# Patient Record
Sex: MALE | Race: WHITE | Employment: FULL TIME | URBAN - METROPOLITAN AREA
[De-identification: names, ages, dates, MRNs, and addresses within clinical notes are randomized per-mention and may not be internally consistent; named-entity substitution may affect disease eponyms.]

---

## 2022-01-11 ENCOUNTER — ANESTHESIA (OUTPATIENT)
Dept: OPERATING ROOM | Age: 47
DRG: 853 | End: 2022-01-11

## 2022-01-11 ENCOUNTER — ANESTHESIA EVENT (OUTPATIENT)
Dept: OPERATING ROOM | Age: 47
DRG: 853 | End: 2022-01-11

## 2022-01-11 ENCOUNTER — HOSPITAL ENCOUNTER (INPATIENT)
Age: 47
LOS: 6 days | Discharge: HOME | DRG: 853 | End: 2022-01-17
Attending: COLON & RECTAL SURGERY | Admitting: COLON & RECTAL SURGERY

## 2022-01-11 ENCOUNTER — APPOINTMENT (OUTPATIENT)
Dept: CT IMAGING | Age: 47
End: 2022-01-11

## 2022-01-11 ENCOUNTER — HOSPITAL ENCOUNTER (EMERGENCY)
Age: 47
Discharge: ANOTHER HOSPITAL | End: 2022-01-11
Attending: EMERGENCY MEDICINE

## 2022-01-11 VITALS
SYSTOLIC BLOOD PRESSURE: 102 MMHG | RESPIRATION RATE: 9 BRPM | DIASTOLIC BLOOD PRESSURE: 59 MMHG | OXYGEN SATURATION: 97 % | TEMPERATURE: 101.1 F

## 2022-01-11 VITALS
HEART RATE: 138 BPM | OXYGEN SATURATION: 91 % | TEMPERATURE: 100.9 F | SYSTOLIC BLOOD PRESSURE: 111 MMHG | RESPIRATION RATE: 20 BRPM | DIASTOLIC BLOOD PRESSURE: 75 MMHG

## 2022-01-11 DIAGNOSIS — K35.32 APPENDICITIS WITH PERFORATION: Primary | ICD-10-CM

## 2022-01-11 DIAGNOSIS — K35.32 ACUTE PERFORATED APPENDICITIS: Primary | ICD-10-CM

## 2022-01-11 LAB
ALBUMIN SERPL-MCNC: 4.7 G/DL (ref 3.5–4.6)
ALP BLD-CCNC: 92 U/L (ref 35–104)
ALT SERPL-CCNC: 46 U/L (ref 0–41)
ANION GAP SERPL CALCULATED.3IONS-SCNC: 14 MEQ/L (ref 9–15)
AST SERPL-CCNC: 16 U/L (ref 0–40)
BACTERIA: NEGATIVE /HPF
BASOPHILS ABSOLUTE: 0 K/UL (ref 0–0.1)
BASOPHILS RELATIVE PERCENT: 0.2 % (ref 0.2–1.2)
BILIRUB SERPL-MCNC: 1 MG/DL (ref 0.2–0.7)
BILIRUBIN URINE: ABNORMAL
BLOOD, URINE: ABNORMAL
BUN BLDV-MCNC: 9 MG/DL (ref 6–20)
CALCIUM SERPL-MCNC: 9.7 MG/DL (ref 8.5–9.9)
CHLORIDE BLD-SCNC: 95 MEQ/L (ref 95–107)
CLARITY: CLEAR
CO2: 25 MEQ/L (ref 20–31)
COLOR: ABNORMAL
CREAT SERPL-MCNC: 1.06 MG/DL (ref 0.7–1.2)
EOSINOPHILS ABSOLUTE: 0 K/UL (ref 0–0.5)
EOSINOPHILS RELATIVE PERCENT: 0.1 % (ref 0.8–7)
EPITHELIAL CELLS, UA: NORMAL /HPF
GFR AFRICAN AMERICAN: >60
GFR NON-AFRICAN AMERICAN: >60
GLOBULIN: 3.1 G/DL (ref 2.3–3.5)
GLUCOSE BLD-MCNC: 193 MG/DL (ref 70–99)
GLUCOSE URINE: NEGATIVE MG/DL
HCT VFR BLD CALC: 45.6 % (ref 42–52)
HEMOGLOBIN: 15.7 G/DL (ref 13.7–17.5)
IMMATURE GRANULOCYTES #: 0.1 K/UL
IMMATURE GRANULOCYTES %: 0.6 %
KETONES, URINE: ABNORMAL MG/DL
LEUKOCYTE ESTERASE, URINE: NEGATIVE
LIPASE: 19 U/L (ref 12–95)
LYMPHOCYTES ABSOLUTE: 1.3 K/UL (ref 1.3–3.6)
LYMPHOCYTES RELATIVE PERCENT: 7.4 %
MCH RBC QN AUTO: 29 PG (ref 25.7–32.2)
MCHC RBC AUTO-ENTMCNC: 34.4 % (ref 32.3–36.5)
MCV RBC AUTO: 84.1 FL (ref 79–92.2)
MONOCYTES ABSOLUTE: 1.6 K/UL (ref 0.3–0.8)
MONOCYTES RELATIVE PERCENT: 8.8 % (ref 5.3–12.2)
NEUTROPHILS ABSOLUTE: 14.8 K/UL (ref 1.8–5.4)
NEUTROPHILS RELATIVE PERCENT: 82.9 % (ref 34–67.9)
NITRITE, URINE: NEGATIVE
PDW BLD-RTO: 12.5 % (ref 11.6–14.4)
PH UA: 5.5 (ref 5–9)
PLATELET # BLD: 240 K/UL (ref 163–337)
POTASSIUM SERPL-SCNC: 4 MEQ/L (ref 3.4–4.9)
PROTEIN UA: 30 MG/DL
RBC # BLD: 5.42 M/UL (ref 4.63–6.08)
RBC UA: NORMAL /HPF (ref 0–2)
SARS-COV-2, NAAT: NOT DETECTED
SODIUM BLD-SCNC: 134 MEQ/L (ref 135–144)
SPECIFIC GRAVITY UA: >=1.03 (ref 1–1.03)
TOTAL PROTEIN: 7.8 G/DL (ref 6.3–8)
URINE REFLEX TO CULTURE: ABNORMAL
UROBILINOGEN, URINE: 1 E.U./DL
WBC # BLD: 17.9 K/UL (ref 4.2–9)
WBC UA: NORMAL /HPF (ref 0–5)

## 2022-01-11 PROCEDURE — 88304 TISSUE EXAM BY PATHOLOGIST: CPT

## 2022-01-11 PROCEDURE — 85025 COMPLETE CBC W/AUTO DIFF WBC: CPT

## 2022-01-11 PROCEDURE — 2580000003 HC RX 258: Performed by: COLON & RECTAL SURGERY

## 2022-01-11 PROCEDURE — 6360000002 HC RX W HCPCS: Performed by: EMERGENCY MEDICINE

## 2022-01-11 PROCEDURE — 3600000004 HC SURGERY LEVEL 4 BASE: Performed by: COLON & RECTAL SURGERY

## 2022-01-11 PROCEDURE — 6360000002 HC RX W HCPCS: Performed by: COLON & RECTAL SURGERY

## 2022-01-11 PROCEDURE — 7100000001 HC PACU RECOVERY - ADDTL 15 MIN: Performed by: COLON & RECTAL SURGERY

## 2022-01-11 PROCEDURE — 87077 CULTURE AEROBIC IDENTIFY: CPT

## 2022-01-11 PROCEDURE — 6360000002 HC RX W HCPCS: Performed by: NURSE ANESTHETIST, CERTIFIED REGISTERED

## 2022-01-11 PROCEDURE — 6370000000 HC RX 637 (ALT 250 FOR IP): Performed by: EMERGENCY MEDICINE

## 2022-01-11 PROCEDURE — 6360000002 HC RX W HCPCS: Performed by: STUDENT IN AN ORGANIZED HEALTH CARE EDUCATION/TRAINING PROGRAM

## 2022-01-11 PROCEDURE — 99253 IP/OBS CNSLTJ NEW/EST LOW 45: CPT | Performed by: COLON & RECTAL SURGERY

## 2022-01-11 PROCEDURE — 87205 SMEAR GRAM STAIN: CPT

## 2022-01-11 PROCEDURE — 3600000014 HC SURGERY LEVEL 4 ADDTL 15MIN: Performed by: COLON & RECTAL SURGERY

## 2022-01-11 PROCEDURE — 7100000000 HC PACU RECOVERY - FIRST 15 MIN: Performed by: COLON & RECTAL SURGERY

## 2022-01-11 PROCEDURE — 87070 CULTURE OTHR SPECIMN AEROBIC: CPT

## 2022-01-11 PROCEDURE — 87075 CULTR BACTERIA EXCEPT BLOOD: CPT

## 2022-01-11 PROCEDURE — 44970 LAPAROSCOPY APPENDECTOMY: CPT | Performed by: COLON & RECTAL SURGERY

## 2022-01-11 PROCEDURE — 6370000000 HC RX 637 (ALT 250 FOR IP)

## 2022-01-11 PROCEDURE — 93005 ELECTROCARDIOGRAM TRACING: CPT | Performed by: STUDENT IN AN ORGANIZED HEALTH CARE EDUCATION/TRAINING PROGRAM

## 2022-01-11 PROCEDURE — 2500000003 HC RX 250 WO HCPCS: Performed by: NURSE ANESTHETIST, CERTIFIED REGISTERED

## 2022-01-11 PROCEDURE — 83690 ASSAY OF LIPASE: CPT

## 2022-01-11 PROCEDURE — 81001 URINALYSIS AUTO W/SCOPE: CPT

## 2022-01-11 PROCEDURE — 74176 CT ABD & PELVIS W/O CONTRAST: CPT

## 2022-01-11 PROCEDURE — 99284 EMERGENCY DEPT VISIT MOD MDM: CPT

## 2022-01-11 PROCEDURE — 2580000003 HC RX 258: Performed by: STUDENT IN AN ORGANIZED HEALTH CARE EDUCATION/TRAINING PROGRAM

## 2022-01-11 PROCEDURE — 96365 THER/PROPH/DIAG IV INF INIT: CPT

## 2022-01-11 PROCEDURE — 64488 TAP BLOCK BI INJECTION: CPT | Performed by: STUDENT IN AN ORGANIZED HEALTH CARE EDUCATION/TRAINING PROGRAM

## 2022-01-11 PROCEDURE — 2580000003 HC RX 258: Performed by: EMERGENCY MEDICINE

## 2022-01-11 PROCEDURE — 2709999900 HC NON-CHARGEABLE SUPPLY: Performed by: COLON & RECTAL SURGERY

## 2022-01-11 PROCEDURE — 6370000000 HC RX 637 (ALT 250 FOR IP): Performed by: COLON & RECTAL SURGERY

## 2022-01-11 PROCEDURE — 96375 TX/PRO/DX INJ NEW DRUG ADDON: CPT

## 2022-01-11 PROCEDURE — 2500000003 HC RX 250 WO HCPCS: Performed by: STUDENT IN AN ORGANIZED HEALTH CARE EDUCATION/TRAINING PROGRAM

## 2022-01-11 PROCEDURE — 3700000000 HC ANESTHESIA ATTENDED CARE: Performed by: COLON & RECTAL SURGERY

## 2022-01-11 PROCEDURE — 80053 COMPREHEN METABOLIC PANEL: CPT

## 2022-01-11 PROCEDURE — 96376 TX/PRO/DX INJ SAME DRUG ADON: CPT

## 2022-01-11 PROCEDURE — 2580000003 HC RX 258: Performed by: NURSE ANESTHETIST, CERTIFIED REGISTERED

## 2022-01-11 PROCEDURE — 2720000010 HC SURG SUPPLY STERILE: Performed by: COLON & RECTAL SURGERY

## 2022-01-11 PROCEDURE — 87635 SARS-COV-2 COVID-19 AMP PRB: CPT

## 2022-01-11 PROCEDURE — 3700000001 HC ADD 15 MINUTES (ANESTHESIA): Performed by: COLON & RECTAL SURGERY

## 2022-01-11 PROCEDURE — 1210000000 HC MED SURG R&B

## 2022-01-11 PROCEDURE — 0DTJ4ZZ RESECTION OF APPENDIX, PERCUTANEOUS ENDOSCOPIC APPROACH: ICD-10-PCS | Performed by: COLON & RECTAL SURGERY

## 2022-01-11 PROCEDURE — 87186 SC STD MICRODIL/AGAR DIL: CPT

## 2022-01-11 RX ORDER — ONDANSETRON 2 MG/ML
4 INJECTION INTRAMUSCULAR; INTRAVENOUS
Status: DISCONTINUED | OUTPATIENT
Start: 2022-01-11 | End: 2022-01-11 | Stop reason: HOSPADM

## 2022-01-11 RX ORDER — MAGNESIUM HYDROXIDE 1200 MG/15ML
LIQUID ORAL CONTINUOUS PRN
Status: DISCONTINUED | OUTPATIENT
Start: 2022-01-11 | End: 2022-01-11 | Stop reason: ALTCHOICE

## 2022-01-11 RX ORDER — OMEPRAZOLE 10 MG/1
10 CAPSULE, DELAYED RELEASE ORAL PRN
COMMUNITY

## 2022-01-11 RX ORDER — LIDOCAINE HYDROCHLORIDE AND EPINEPHRINE 10; 10 MG/ML; UG/ML
INJECTION, SOLUTION INFILTRATION; PERINEURAL PRN
Status: DISCONTINUED | OUTPATIENT
Start: 2022-01-11 | End: 2022-01-11 | Stop reason: SDUPTHER

## 2022-01-11 RX ORDER — MEPERIDINE HYDROCHLORIDE 25 MG/ML
12.5 INJECTION INTRAMUSCULAR; INTRAVENOUS; SUBCUTANEOUS EVERY 5 MIN PRN
Status: DISCONTINUED | OUTPATIENT
Start: 2022-01-11 | End: 2022-01-11 | Stop reason: HOSPADM

## 2022-01-11 RX ORDER — HYDROCODONE BITARTRATE AND ACETAMINOPHEN 5; 325 MG/1; MG/1
1 TABLET ORAL PRN
Status: DISCONTINUED | OUTPATIENT
Start: 2022-01-11 | End: 2022-01-11 | Stop reason: HOSPADM

## 2022-01-11 RX ORDER — OXYCODONE HYDROCHLORIDE AND ACETAMINOPHEN 5; 325 MG/1; MG/1
1 TABLET ORAL EVERY 4 HOURS PRN
Status: DISCONTINUED | OUTPATIENT
Start: 2022-01-11 | End: 2022-01-17 | Stop reason: HOSPADM

## 2022-01-11 RX ORDER — SODIUM CHLORIDE 9 MG/ML
25 INJECTION, SOLUTION INTRAVENOUS PRN
Status: DISCONTINUED | OUTPATIENT
Start: 2022-01-11 | End: 2022-01-17 | Stop reason: HOSPADM

## 2022-01-11 RX ORDER — ONDANSETRON 2 MG/ML
4 INJECTION INTRAMUSCULAR; INTRAVENOUS ONCE
Status: COMPLETED | OUTPATIENT
Start: 2022-01-11 | End: 2022-01-11

## 2022-01-11 RX ORDER — ROPIVACAINE HYDROCHLORIDE 5 MG/ML
INJECTION, SOLUTION EPIDURAL; INFILTRATION; PERINEURAL
Status: COMPLETED | OUTPATIENT
Start: 2022-01-11 | End: 2022-01-11

## 2022-01-11 RX ORDER — ACETAMINOPHEN 325 MG/1
650 TABLET ORAL ONCE
Status: COMPLETED | OUTPATIENT
Start: 2022-01-11 | End: 2022-01-11

## 2022-01-11 RX ORDER — FENTANYL CITRATE 50 UG/ML
INJECTION, SOLUTION INTRAMUSCULAR; INTRAVENOUS PRN
Status: DISCONTINUED | OUTPATIENT
Start: 2022-01-11 | End: 2022-01-11 | Stop reason: SDUPTHER

## 2022-01-11 RX ORDER — ROCURONIUM BROMIDE 10 MG/ML
INJECTION, SOLUTION INTRAVENOUS PRN
Status: DISCONTINUED | OUTPATIENT
Start: 2022-01-11 | End: 2022-01-11 | Stop reason: SDUPTHER

## 2022-01-11 RX ORDER — MORPHINE SULFATE 4 MG/ML
4 INJECTION, SOLUTION INTRAMUSCULAR; INTRAVENOUS ONCE
Status: COMPLETED | OUTPATIENT
Start: 2022-01-11 | End: 2022-01-11

## 2022-01-11 RX ORDER — MIDAZOLAM HYDROCHLORIDE 1 MG/ML
INJECTION INTRAMUSCULAR; INTRAVENOUS PRN
Status: DISCONTINUED | OUTPATIENT
Start: 2022-01-11 | End: 2022-01-11 | Stop reason: SDUPTHER

## 2022-01-11 RX ORDER — SODIUM CHLORIDE 0.9 % (FLUSH) 0.9 %
3 SYRINGE (ML) INJECTION EVERY 8 HOURS
Status: DISCONTINUED | OUTPATIENT
Start: 2022-01-11 | End: 2022-01-11 | Stop reason: HOSPADM

## 2022-01-11 RX ORDER — PROPOFOL 10 MG/ML
INJECTION, EMULSION INTRAVENOUS PRN
Status: DISCONTINUED | OUTPATIENT
Start: 2022-01-11 | End: 2022-01-11 | Stop reason: SDUPTHER

## 2022-01-11 RX ORDER — 0.9 % SODIUM CHLORIDE 0.9 %
500 INTRAVENOUS SOLUTION INTRAVENOUS ONCE
Status: COMPLETED | OUTPATIENT
Start: 2022-01-11 | End: 2022-01-11

## 2022-01-11 RX ORDER — SODIUM CHLORIDE 0.9 % (FLUSH) 0.9 %
5-40 SYRINGE (ML) INJECTION EVERY 12 HOURS SCHEDULED
Status: DISCONTINUED | OUTPATIENT
Start: 2022-01-11 | End: 2022-01-17 | Stop reason: HOSPADM

## 2022-01-11 RX ORDER — ONDANSETRON 4 MG/1
4 TABLET, ORALLY DISINTEGRATING ORAL EVERY 8 HOURS PRN
Status: DISCONTINUED | OUTPATIENT
Start: 2022-01-11 | End: 2022-01-17 | Stop reason: HOSPADM

## 2022-01-11 RX ORDER — SODIUM CHLORIDE 9 MG/ML
INJECTION, SOLUTION INTRAVENOUS CONTINUOUS
Status: DISCONTINUED | OUTPATIENT
Start: 2022-01-11 | End: 2022-01-16

## 2022-01-11 RX ORDER — METOCLOPRAMIDE HYDROCHLORIDE 5 MG/ML
10 INJECTION INTRAMUSCULAR; INTRAVENOUS
Status: DISCONTINUED | OUTPATIENT
Start: 2022-01-11 | End: 2022-01-11 | Stop reason: HOSPADM

## 2022-01-11 RX ORDER — KETOROLAC TROMETHAMINE 30 MG/ML
INJECTION, SOLUTION INTRAMUSCULAR; INTRAVENOUS PRN
Status: DISCONTINUED | OUTPATIENT
Start: 2022-01-11 | End: 2022-01-11 | Stop reason: SDUPTHER

## 2022-01-11 RX ORDER — SODIUM CHLORIDE 9 MG/ML
INJECTION, SOLUTION INTRAVENOUS ONCE
Status: COMPLETED | OUTPATIENT
Start: 2022-01-11 | End: 2022-01-11

## 2022-01-11 RX ORDER — SODIUM CHLORIDE 0.9 % (FLUSH) 0.9 %
5-40 SYRINGE (ML) INJECTION PRN
Status: DISCONTINUED | OUTPATIENT
Start: 2022-01-11 | End: 2022-01-17 | Stop reason: HOSPADM

## 2022-01-11 RX ORDER — SODIUM CHLORIDE 9 MG/ML
25 INJECTION, SOLUTION INTRAVENOUS PRN
Status: DISCONTINUED | OUTPATIENT
Start: 2022-01-11 | End: 2022-01-11 | Stop reason: HOSPADM

## 2022-01-11 RX ORDER — MORPHINE SULFATE 2 MG/ML
2 INJECTION, SOLUTION INTRAMUSCULAR; INTRAVENOUS ONCE
Status: COMPLETED | OUTPATIENT
Start: 2022-01-11 | End: 2022-01-11

## 2022-01-11 RX ORDER — DIPHENHYDRAMINE HYDROCHLORIDE 50 MG/ML
12.5 INJECTION INTRAMUSCULAR; INTRAVENOUS
Status: ACTIVE | OUTPATIENT
Start: 2022-01-11 | End: 2022-01-11

## 2022-01-11 RX ORDER — ONDANSETRON 2 MG/ML
4 INJECTION INTRAMUSCULAR; INTRAVENOUS EVERY 6 HOURS PRN
Status: DISCONTINUED | OUTPATIENT
Start: 2022-01-11 | End: 2022-01-17 | Stop reason: HOSPADM

## 2022-01-11 RX ORDER — LIDOCAINE HYDROCHLORIDE 20 MG/ML
INJECTION, SOLUTION INFILTRATION; PERINEURAL PRN
Status: DISCONTINUED | OUTPATIENT
Start: 2022-01-11 | End: 2022-01-11 | Stop reason: SDUPTHER

## 2022-01-11 RX ORDER — ACETAMINOPHEN 500 MG
500 TABLET ORAL EVERY 4 HOURS PRN
Status: DISCONTINUED | OUTPATIENT
Start: 2022-01-11 | End: 2022-01-17 | Stop reason: HOSPADM

## 2022-01-11 RX ORDER — ONDANSETRON 2 MG/ML
INJECTION INTRAMUSCULAR; INTRAVENOUS PRN
Status: DISCONTINUED | OUTPATIENT
Start: 2022-01-11 | End: 2022-01-11 | Stop reason: SDUPTHER

## 2022-01-11 RX ORDER — SODIUM CHLORIDE, SODIUM LACTATE, POTASSIUM CHLORIDE, CALCIUM CHLORIDE 600; 310; 30; 20 MG/100ML; MG/100ML; MG/100ML; MG/100ML
INJECTION, SOLUTION INTRAVENOUS CONTINUOUS
Status: DISCONTINUED | OUTPATIENT
Start: 2022-01-11 | End: 2022-01-11

## 2022-01-11 RX ORDER — HYDROCODONE BITARTRATE AND ACETAMINOPHEN 5; 325 MG/1; MG/1
2 TABLET ORAL PRN
Status: DISCONTINUED | OUTPATIENT
Start: 2022-01-11 | End: 2022-01-11 | Stop reason: HOSPADM

## 2022-01-11 RX ORDER — SODIUM CHLORIDE, SODIUM LACTATE, POTASSIUM CHLORIDE, CALCIUM CHLORIDE 600; 310; 30; 20 MG/100ML; MG/100ML; MG/100ML; MG/100ML
INJECTION, SOLUTION INTRAVENOUS CONTINUOUS PRN
Status: DISCONTINUED | OUTPATIENT
Start: 2022-01-11 | End: 2022-01-11 | Stop reason: SDUPTHER

## 2022-01-11 RX ORDER — SODIUM CHLORIDE 0.9 % (FLUSH) 0.9 %
10 SYRINGE (ML) INJECTION EVERY 12 HOURS SCHEDULED
Status: DISCONTINUED | OUTPATIENT
Start: 2022-01-11 | End: 2022-01-11 | Stop reason: HOSPADM

## 2022-01-11 RX ORDER — OXYCODONE HYDROCHLORIDE AND ACETAMINOPHEN 5; 325 MG/1; MG/1
2 TABLET ORAL EVERY 4 HOURS PRN
Status: DISCONTINUED | OUTPATIENT
Start: 2022-01-11 | End: 2022-01-17 | Stop reason: HOSPADM

## 2022-01-11 RX ORDER — FENTANYL CITRATE 50 UG/ML
50 INJECTION, SOLUTION INTRAMUSCULAR; INTRAVENOUS EVERY 10 MIN PRN
Status: DISCONTINUED | OUTPATIENT
Start: 2022-01-11 | End: 2022-01-12

## 2022-01-11 RX ORDER — LIDOCAINE HYDROCHLORIDE 10 MG/ML
1 INJECTION, SOLUTION EPIDURAL; INFILTRATION; INTRACAUDAL; PERINEURAL
Status: DISCONTINUED | OUTPATIENT
Start: 2022-01-11 | End: 2022-01-11 | Stop reason: HOSPADM

## 2022-01-11 RX ORDER — DEXAMETHASONE SODIUM PHOSPHATE 4 MG/ML
INJECTION, SOLUTION INTRA-ARTICULAR; INTRALESIONAL; INTRAMUSCULAR; INTRAVENOUS; SOFT TISSUE PRN
Status: DISCONTINUED | OUTPATIENT
Start: 2022-01-11 | End: 2022-01-11 | Stop reason: SDUPTHER

## 2022-01-11 RX ORDER — SODIUM CHLORIDE 0.9 % (FLUSH) 0.9 %
10 SYRINGE (ML) INJECTION PRN
Status: DISCONTINUED | OUTPATIENT
Start: 2022-01-11 | End: 2022-01-11 | Stop reason: HOSPADM

## 2022-01-11 RX ADMIN — PIPERACILLIN SODIUM AND TAZOBACTAM SODIUM 3375 MG: 3; .375 INJECTION, POWDER, LYOPHILIZED, FOR SOLUTION INTRAVENOUS at 20:07

## 2022-01-11 RX ADMIN — ROPIVACAINE HYDROCHLORIDE 30 ML: 5 INJECTION, SOLUTION EPIDURAL; INFILTRATION; PERINEURAL at 12:06

## 2022-01-11 RX ADMIN — OXYCODONE AND ACETAMINOPHEN 1 TABLET: 5; 325 TABLET ORAL at 23:27

## 2022-01-11 RX ADMIN — MORPHINE SULFATE 4 MG: 4 INJECTION, SOLUTION INTRAMUSCULAR; INTRAVENOUS at 04:48

## 2022-01-11 RX ADMIN — MORPHINE SULFATE 2 MG: 2 INJECTION, SOLUTION INTRAMUSCULAR; INTRAVENOUS at 05:51

## 2022-01-11 RX ADMIN — ONDANSETRON 4 MG: 2 INJECTION INTRAMUSCULAR; INTRAVENOUS at 04:48

## 2022-01-11 RX ADMIN — FENTANYL CITRATE 50 MCG: 50 INJECTION, SOLUTION INTRAMUSCULAR; INTRAVENOUS at 14:15

## 2022-01-11 RX ADMIN — PROPOFOL 200 MG: 10 INJECTION, EMULSION INTRAVENOUS at 11:59

## 2022-01-11 RX ADMIN — LIDOCAINE HYDROCHLORIDE 60 MG: 20 INJECTION, SOLUTION INFILTRATION; PERINEURAL at 11:59

## 2022-01-11 RX ADMIN — FENTANYL CITRATE 50 MCG: 50 INJECTION, SOLUTION INTRAMUSCULAR; INTRAVENOUS at 14:24

## 2022-01-11 RX ADMIN — PIPERACILLIN AND TAZOBACTAM 3375 MG: 3; .375 INJECTION, POWDER, LYOPHILIZED, FOR SOLUTION INTRAVENOUS at 11:55

## 2022-01-11 RX ADMIN — SODIUM CHLORIDE: 9 INJECTION, SOLUTION INTRAVENOUS at 20:42

## 2022-01-11 RX ADMIN — SUGAMMADEX 200 MG: 100 INJECTION, SOLUTION INTRAVENOUS at 13:07

## 2022-01-11 RX ADMIN — SODIUM CHLORIDE 500 ML: 9 INJECTION, SOLUTION INTRAVENOUS at 04:47

## 2022-01-11 RX ADMIN — SODIUM CHLORIDE, POTASSIUM CHLORIDE, SODIUM LACTATE AND CALCIUM CHLORIDE: 600; 310; 30; 20 INJECTION, SOLUTION INTRAVENOUS at 11:55

## 2022-01-11 RX ADMIN — DEXAMETHASONE SODIUM PHOSPHATE 4 MG: 4 INJECTION, SOLUTION INTRAMUSCULAR; INTRAVENOUS at 12:07

## 2022-01-11 RX ADMIN — SODIUM CHLORIDE: 9 INJECTION, SOLUTION INTRAVENOUS at 06:50

## 2022-01-11 RX ADMIN — ONDANSETRON 4 MG: 2 INJECTION INTRAMUSCULAR; INTRAVENOUS at 12:07

## 2022-01-11 RX ADMIN — FENTANYL CITRATE 50 MCG: 50 INJECTION, SOLUTION INTRAMUSCULAR; INTRAVENOUS at 12:44

## 2022-01-11 RX ADMIN — ROCURONIUM BROMIDE 50 MG: 10 INJECTION INTRAVENOUS at 11:59

## 2022-01-11 RX ADMIN — PIPERACILLIN AND TAZOBACTAM 3375 MG: 3; .375 INJECTION, POWDER, LYOPHILIZED, FOR SOLUTION INTRAVENOUS at 05:51

## 2022-01-11 RX ADMIN — ACETAMINOPHEN 650 MG: 325 TABLET ORAL at 07:22

## 2022-01-11 RX ADMIN — SODIUM CHLORIDE, POTASSIUM CHLORIDE, SODIUM LACTATE AND CALCIUM CHLORIDE: 600; 310; 30; 20 INJECTION, SOLUTION INTRAVENOUS at 09:52

## 2022-01-11 RX ADMIN — MIDAZOLAM HYDROCHLORIDE 2 MG: 1 INJECTION, SOLUTION INTRAMUSCULAR; INTRAVENOUS at 11:55

## 2022-01-11 RX ADMIN — LIDOCAINE HYDROCHLORIDE AND EPINEPHRINE 10 ML: 10; 10 INJECTION, SOLUTION INFILTRATION; PERINEURAL at 12:05

## 2022-01-11 RX ADMIN — FENTANYL CITRATE 50 MCG: 50 INJECTION, SOLUTION INTRAMUSCULAR; INTRAVENOUS at 11:59

## 2022-01-11 RX ADMIN — KETOROLAC TROMETHAMINE 30 MG: 30 INJECTION, SOLUTION INTRAMUSCULAR; INTRAVENOUS at 12:45

## 2022-01-11 ASSESSMENT — PULMONARY FUNCTION TESTS
PIF_VALUE: 25
PIF_VALUE: 21
PIF_VALUE: 36
PIF_VALUE: 35
PIF_VALUE: 35
PIF_VALUE: 33
PIF_VALUE: 22
PIF_VALUE: 26
PIF_VALUE: 21
PIF_VALUE: 35
PIF_VALUE: 2
PIF_VALUE: 0
PIF_VALUE: 22
PIF_VALUE: 21
PIF_VALUE: 35
PIF_VALUE: 24
PIF_VALUE: 35
PIF_VALUE: 30
PIF_VALUE: 22
PIF_VALUE: 35
PIF_VALUE: 0
PIF_VALUE: 35
PIF_VALUE: 31
PIF_VALUE: 35
PIF_VALUE: 33
PIF_VALUE: 2
PIF_VALUE: 42
PIF_VALUE: 35
PIF_VALUE: 36
PIF_VALUE: 2
PIF_VALUE: 34
PIF_VALUE: 35
PIF_VALUE: 35
PIF_VALUE: 14
PIF_VALUE: 35
PIF_VALUE: 35
PIF_VALUE: 28
PIF_VALUE: 35
PIF_VALUE: 21
PIF_VALUE: 33
PIF_VALUE: 35
PIF_VALUE: 22
PIF_VALUE: 21
PIF_VALUE: 36
PIF_VALUE: 35
PIF_VALUE: 23
PIF_VALUE: 35
PIF_VALUE: 28
PIF_VALUE: 35
PIF_VALUE: 36
PIF_VALUE: 35
PIF_VALUE: 22
PIF_VALUE: 33
PIF_VALUE: 33
PIF_VALUE: 1
PIF_VALUE: 31
PIF_VALUE: 27
PIF_VALUE: 27
PIF_VALUE: 0
PIF_VALUE: 24
PIF_VALUE: 33
PIF_VALUE: 29
PIF_VALUE: 27
PIF_VALUE: 35
PIF_VALUE: 35
PIF_VALUE: 36
PIF_VALUE: 20
PIF_VALUE: 0
PIF_VALUE: 35
PIF_VALUE: 33
PIF_VALUE: 35
PIF_VALUE: 21
PIF_VALUE: 35
PIF_VALUE: 35

## 2022-01-11 ASSESSMENT — PAIN DESCRIPTION - ORIENTATION: ORIENTATION: MID;LOWER

## 2022-01-11 ASSESSMENT — ENCOUNTER SYMPTOMS
COUGH: 0
COLOR CHANGE: 0
SORE THROAT: 0
SHORTNESS OF BREATH: 0
DIARRHEA: 0
NAUSEA: 0
EYE REDNESS: 0
BACK PAIN: 0
VOMITING: 0
ABDOMINAL PAIN: 1
SINUS PAIN: 0
EYE DISCHARGE: 0

## 2022-01-11 ASSESSMENT — PAIN SCALES - GENERAL
PAINLEVEL_OUTOF10: 10
PAINLEVEL_OUTOF10: 8
PAINLEVEL_OUTOF10: 9
PAINLEVEL_OUTOF10: 10
PAINLEVEL_OUTOF10: 10
PAINLEVEL_OUTOF10: 6

## 2022-01-11 ASSESSMENT — PAIN DESCRIPTION - LOCATION: LOCATION: ABDOMEN

## 2022-01-11 ASSESSMENT — PAIN DESCRIPTION - PAIN TYPE: TYPE: ACUTE PAIN

## 2022-01-11 ASSESSMENT — LIFESTYLE VARIABLES: SMOKING_STATUS: 1

## 2022-01-11 ASSESSMENT — PAIN DESCRIPTION - DESCRIPTORS: DESCRIPTORS: CRAMPING;PRESSURE

## 2022-01-11 NOTE — BRIEF OP NOTE
Brief Postoperative Note      Patient: George Hair  YOB: 1975  MRN: [id number]    Date of Procedure: 1/11/2022    Pre-Op Diagnosis: APPENDICITIS    Post-Op Diagnosis: Perforated necrotic appendicitis       Procedure(s):  LAPAROSCOPIC APPENDECTOMY, PATIENT COMING FROM [place]    Surgeon(s):  Judith Johnson MD    Assistant:  First Assistant: Sangeeta Briggs    Anesthesia: General    Estimated Blood Loss (mL): 25    Complications: None    Specimens:   ID Type Source Tests Collected by Time Destination   1 : pelvic abscess Body Fluid Abdomen CULTURE, SURGICAL Judith Johnson MD 1/11/2022 1258    A : Appendix Tissue Appendix SURGICAL PATHOLOGY Judith Johnson MD 1/11/2022 1245        Implants:  * No implants in log *      Drains:   Urethral Catheter Straight-tip 16 fr (Active)   Urine Color Zaida 01/11/22 1010       Findings: Perforated necrotic appendicitis with pelvic abscess    Electronically signed by Scarlett Leiva MD on 1/11/2022 at 1:16 PM

## 2022-01-11 NOTE — ED NOTES
Dr Aleks yu served inquiring if he had a time frame on when he wanted patient to be in [facility], ready for surgery so  transport can be arranged appropriately.  Responded with \"No\"     [employer], RN  01/11/22 2726

## 2022-01-11 NOTE — PROGRESS NOTES
13:15 Arrived to PACU from OR monitor leads applied rec'd report and pt assessed. Remains sleepy from surgery sinus tach on tele B/P stable. 13:30 Dr. Selmer Kawasaki at bedside updated family and new orders rec'd. Will monitor. 13:45 More awake resting calmly remains tachy B/P stable.

## 2022-01-11 NOTE — ED TRIAGE NOTES
Patient arrives to ER with mid lower abdominal pain, states he has been having trouble urinating most of yesterday but has been taking in fluids ok. Has had normal BM's but has had lower abdomen cramping, wondered if he was having some constipation still so he drank some metamucil. Patient also states prior to coming into the ER he had some nausea and broke out in a sweat but denies vomiting or diarrhea.

## 2022-01-11 NOTE — PROGRESS NOTES
Pharmacy Dose Adjustment Per Protocol    Micheline Cogan is a 40 y.o. male. Recent Labs     01/11/22  0447   BUN 9   CREATININE 1.06   Estimated Creatinine Clearance: 105 mL/min (based on SCr of 1.06 mg/dL). Maycol Ramires Height: 6' (182.9 cm), Weight: 210 lb (95.3 kg), Body mass index is 28.48 kg/m².     Piperacillin/Tazobactam [Zosyn]      Medication Ordered:   Traditional Dosing Change to Extended Infusion:   CrCl ?20 ml/min [] Zosyn 2.25 gm q6-8 hr [x] Zosyn 3.375 gm IV q8h, infuse over 4 hr    [x] Zosyn 3.375 gm q6-8 hr     [] Zosyn 4.5 gm q6-8 hr    CrCl <20 ml/min or ESRD [] Zosyn 2.25 gm q6-8 hr [] Zosyn 3.375 gm IV q12h, infuse over 4 hr    [] Zosyn 3.375 gm q6-8 hr     [] Zosyn 4.5 gm q6-8 hr      Thank you,

## 2022-01-11 NOTE — ANESTHESIA POSTPROCEDURE EVALUATION
Department of Anesthesiology  Postprocedure Note    Patient: Jim Mendoza  MRN: [id number]  YOB: 1975  Date of evaluation: 1/11/2022  Time:  1:17 PM     Procedure Summary     Date: 01/11/22 Room / Location: [facility] OR 01 / [facility]    Anesthesia Start: 3562 Anesthesia Stop: 9253    Procedure: LAPAROSCOPIC APPENDECTOMY, PATIENT COMING FROM [place] (N/A ) Diagnosis: (APPENDICITIS)    Surgeons: Ruth Ivey MD Responsible Provider: Gregory Garcia DO    Anesthesia Type: general, regional ASA Status: 1 - Emergent          Anesthesia Type: general, regional    Sandra Phase I:      Sandra Phase II:      Last vitals: Reviewed and per EMR flowsheets.        Anesthesia Post Evaluation    Patient location during evaluation: PACU  Patient participation: complete - patient participated  Level of consciousness: awake and awake and alert  Airway patency: patent  Nausea & Vomiting: no nausea and no vomiting  Complications: no  Cardiovascular status: blood pressure returned to baseline and hemodynamically stable  Respiratory status: acceptable  Hydration status: euvolemic

## 2022-01-11 NOTE — ANESTHESIA PROCEDURE NOTES
Peripheral Block    Patient location during procedure: OR  Start time: 1/11/2022 11:58 AM  End time: 1/11/2022 12:06 PM  Staffing  Performed: anesthesiologist   Anesthesiologist: Angely Silva DO  Preanesthetic Checklist  Completed: patient identified, IV checked, site marked, risks and benefits discussed, surgical consent, monitors and equipment checked, pre-op evaluation, timeout performed, anesthesia consent given, oxygen available and patient being monitored  Peripheral Block  Patient position: supine  Prep: ChloraPrep  Patient monitoring: cardiac monitor, continuous pulse ox, frequent blood pressure checks and IV access  Block type: TAP  Laterality: bilateral  Injection technique: single-shot  Guidance: ultrasound guided  Local infiltration: ropivacaine  Infiltration strength: 0.5 %  Dose: 30 mL  Provider prep: mask and sterile gloves (Sterile probe cover)  Local infiltration: ropivacaine  Needle  Needle type: combined needle/nerve stimulator   Needle gauge: 22 G  Needle length: 10 cm  Needle localization: anatomical landmarks and ultrasound guidance  Assessment  Injection assessment: negative aspiration for heme, no paresthesia on injection and local visualized surrounding nerve on ultrasound  Paresthesia pain: immediately resolved  Slow fractionated injection: yes  Hemodynamics: stable  Additional Notes  Ultrasound image printed and saved in patient chart.     Sterile probe cover used    Ropivacaine 0.5 % 30 ml + lidocaine 1% with epi 1:100 k 10 ml    20 ml bilateral  Medications Administered  Ropivacaine (NAROPIN) injection 0.5%, 30 mL  Reason for block: post-op pain management and at surgeon's request

## 2022-01-11 NOTE — ED NOTES
[facility] center called for general surgery to initiate transfer to [address] [place] [address]  01/11/22 2021

## 2022-01-11 NOTE — ED NOTES
Dr Moon Marin returned call to Dr John Foreman, accepted patient for admission     [facility], RN  01/11/22 [address], RN  01/11/22 1217

## 2022-01-11 NOTE — ED PROVIDER NOTES
[facility] ED  EMERGENCY DEPARTMENT ENCOUNTER      Pt Name: Roshan Braden  MRN: [id number]  [place] 1975  Date of evaluation: 1/11/2022  Provider: Debora Sanon DO    CHIEF COMPLAINT       Chief Complaint   Patient presents with    Abdominal Pain     Chief complaint: Abdominal pain  History of chief complaint: This 43-year-old gentleman presents the emergency department complaining of abdominal pain. Patient points over the suprapubic area states it feels like he has to pee but cannot go. Patient states he had a little bit of upset stomach throughout the day yesterday, started in the morning thought maybe he was constipated but had a good bowel movement patient states he felt okay throughout the day, denies any difficulty with urinating throughout the day, no dysuria hematuria frequency or urgency states he was trying to go to sleep about 4 hours prior to arrival, states he did urinate before he went to bed around midnight but was having increasing gradual pain in the suprapubic area like he had to pee but was unable to go anymore, patient denies any pain into the back no fevers chills nausea vomiting weak or dizzy feeling. Patient denies any penile discharge or concern for STD no testicular pain. Patient denies any history of urinary retention. No previous abdominal surgeries    Nursing Notes were reviewed. REVIEW OF SYSTEMS    (2-9 systems for level 4, 10 or more for level 5)     Review of Systems   Constitutional: Negative for chills and fever. HENT: Negative for congestion, sinus pain and sore throat. Eyes: Negative for discharge and redness. Respiratory: Negative for cough and shortness of breath. Cardiovascular: Negative for chest pain, palpitations and leg swelling. Gastrointestinal: Positive for abdominal pain. Negative for diarrhea, nausea and vomiting. Genitourinary: Positive for difficulty urinating.  Negative for dysuria, flank pain, penile discharge, penile pain and Services: Not on file   [facility] or Organizations: Not on file    Attends Club or Organization Meetings: Not on file    Marital Status: Not on file   Intimate Partner Violence:     Fear of Current or Ex-Partner: Not on file    Emotionally Abused: Not on file    Physically Abused: Not on file    Sexually Abused: Not on file   Housing Stability:     Unable to Pay for Housing in the Last Year: Not on file    Number of [place] in the Last Year: Not on file    Unstable Housing in the Last Year: Not on file           PHYSICAL EXAM    (up to 7 for level 4, 8 or more for level 5)     ED Triage Vitals [01/11/22 0356]   BP Temp Temp src Pulse Resp SpO2 Height Weight   (!) 166/95 96.8 °F (36 °C) -- 115 21 98 % -- --       Physical Exam   General appearance: Patient is awake alert interactive appropriate nontoxic in no acute distress but obvious discomfort moaning intermittently  Head is atraumatic normocephalic  Eyes pupils are equal and reactive sclera white conjunctive are pink  Oral pharyngeal cavity is pink with good moisture, no exudates or ulcerations no asymmetry, the airway is widely patent  Neck: Supple no meningeal signs no adenopathy no JVD  Heart: Regular rate and rhythm no gross murmurs rubs or clicks  Lungs: Breath sounds are clear with good air movement throughout no active wheezes rales or rhonchi no respiratory distress  Abdomen: There is tenderness and fullness to palpation the suprapubic region with mild guarding there is no focal McBurney's point tenderness no rebound rigidity or guarding. There is mild general distention. No overlying rash ecchymosis or abrasion bowel sounds are present,no firm or pulsatile masses,  femoral pulses full and symmetric  Genital examination: Normal male genitalia no penile discharge no testicular pain or swelling.   Back: Nontender to palpation no costovertebral angle tenderness  Skin: Warm and dry without rashes  Lower extremities: No edema or repeat assessment patient states feeling significantly improved although still discomfort. Zosyn 3.375 g IV was initiated and repeat morphine 2 mg IV was given    Coordination of CARE: A call was placed out to surgical service at [facility] [place] awaiting a call back to arrange transfer      FINAL IMPRESSION      1. Acute perforated appendicitis          DISPOSITION/PLAN   DISPOSITION    Patient awaiting official acceptance for transfer in stable condition    PATIENT REFERRED TO:  No follow-up provider specified. DISCHARGE MEDICATIONS:  New Prescriptions    No medications on file     Controlled Substances Monitoring:     No flowsheet data found.     (Please note that portions of this note were completed with a voice recognition program.  Efforts were made to edit the dictations but occasionally words are mis-transcribed.)    Tonie Yang DO (electronically signed)  Attending Emergency Physician            Tonie Yang DO  01/16/22 [address]

## 2022-01-11 NOTE — ANESTHESIA PRE PROCEDURE
Department of Anesthesiology  Preprocedure Note       Name:  Mary Johnston   Age:  40 y.o.  :  1975                                          MRN:  [id number]         Date:  2022      Surgeon: Tez Bolton):  Mendy Mijares MD    Procedure: Procedure(s):  LAPAROSCOPIC APPENDECTOMY, PATIENT COMING FROM [place]    Medications prior to admission:   Prior to Admission medications    Not on File       Current medications:    Current Facility-Administered Medications   Medication Dose Route Frequency Provider Last Rate Last Admin    lactated ringers infusion   IntraVENous Continuous Hershall Mahogany Ortiz,  mL/hr at 22 0952 New Bag at 22 0952    piperacillin-tazobactam (ZOSYN) 3,375 mg in dextrose 5 % 50 mL IVPB (mini-bag)  3,375 mg IntraVENous On Call to Natasha Obrien MD           Allergies:  No Known Allergies    Problem List:  There is no problem list on file for this patient. Past Medical History:  History reviewed. No pertinent past medical history. Past Surgical History:  No past surgical history on file. Social History:    Social History     Tobacco Use    Smoking status: Current Every Day Smoker     Packs/day: 0.50     Types: Cigarettes    Smokeless tobacco: Never Used   Substance Use Topics    Alcohol use:  Yes                                Ready to quit: Not Answered  Counseling given: Not Answered      Vital Signs (Current):   Vitals:    22 0942   BP: 126/73   Pulse: 139   Resp: 22   Temp: 101.4 °F (38.6 °C)   TempSrc: Temporal   SpO2: 94%   Weight: 210 lb (95.3 kg)   Height: 6' (1.829 m)                                              BP Readings from Last 3 Encounters:   22 126/73   22 111/75       NPO Status: Time of last liquid consumption: 0100                        Time of last solid consumption: 0000                        Date of last liquid consumption: 22                        Date of last solid food consumption: 01/10/22    BMI: Wt Readings from Last 3 Encounters:   01/11/22 210 lb (95.3 kg)     Body mass index is 28.48 kg/m². CBC:   Lab Results   Component Value Date    WBC 17.9 01/11/2022    RBC 5.42 01/11/2022    HGB 15.7 01/11/2022    HCT 45.6 01/11/2022    MCV 84.1 01/11/2022    RDW 12.5 01/11/2022     01/11/2022       CMP:   Lab Results   Component Value Date     01/11/2022    K 4.0 01/11/2022    CL 95 01/11/2022    CO2 25 01/11/2022    BUN 9 01/11/2022    CREATININE 1.06 01/11/2022    GFRAA >60.0 01/11/2022    LABGLOM >60.0 01/11/2022    GLUCOSE 193 01/11/2022    PROT 7.8 01/11/2022    CALCIUM 9.7 01/11/2022    BILITOT 1.0 01/11/2022    ALKPHOS 92 01/11/2022    AST 16 01/11/2022    ALT 46 01/11/2022       POC Tests: No results for input(s): POCGLU, POCNA, POCK, POCCL, POCBUN, POCHEMO, POCHCT in the last 72 hours. Coags: No results found for: PROTIME, INR, APTT    HCG (If Applicable): No results found for: PREGTESTUR, PREGSERUM, HCG, HCGQUANT     ABGs: No results found for: PHART, PO2ART, GLQ0LPH, ACJ0SQQ, BEART, T5SIWFFD     Type & Screen (If Applicable):  No results found for: LABABO, LABRH    Drug/Infectious Status (If Applicable):  No results found for: HIV, HEPCAB    COVID-19 Screening (If Applicable):   Lab Results   Component Value Date    COVID19 Not Detected 01/11/2022           Anesthesia Evaluation  Patient summary reviewed and Nursing notes reviewed no history of anesthetic complications:   Airway: Mallampati: II  TM distance: >3 FB   Neck ROM: full  Mouth opening: > = 3 FB Dental: normal exam         Pulmonary:normal exam    (+) current smoker                           Cardiovascular:Negative CV ROS  Exercise tolerance: good (>4 METS),         ECG reviewed               Beta Blocker:  Not on Beta Blocker         Neuro/Psych:   Negative Neuro/Psych ROS              GI/Hepatic/Renal: Neg GI/Hepatic/Renal ROS            Endo/Other: Negative Endo/Other ROS             Pt had PAT visit.        Abdominal: Vascular: negative vascular ROS. Other Findings:             Anesthesia Plan      general and regional     ASA 1 - emergent     (ETT  TAP)  Induction: intravenous. MIPS: Postoperative opioids intended and Prophylactic antiemetics administered. Anesthetic plan and risks discussed with patient. Plan discussed with CRNA.     Attending anesthesiologist reviewed and agrees with Tera Milton DO   1/11/2022

## 2022-01-11 NOTE — ED NOTES
Supervisor called to get update with Uri Crowder supervisor regarding patient case and possible OR time.      [address]  01/11/22 1481

## 2022-01-12 LAB
ANION GAP SERPL CALCULATED.3IONS-SCNC: 9 MEQ/L (ref 9–15)
BUN BLDV-MCNC: 15 MG/DL (ref 6–20)
CALCIUM SERPL-MCNC: 9.4 MG/DL (ref 8.5–9.9)
CHLORIDE BLD-SCNC: 101 MEQ/L (ref 95–107)
CO2: 25 MEQ/L (ref 20–31)
CREAT SERPL-MCNC: 0.95 MG/DL (ref 0.7–1.2)
EKG ATRIAL RATE: 134 BPM
EKG P AXIS: 47 DEGREES
EKG P-R INTERVAL: 126 MS
EKG Q-T INTERVAL: 300 MS
EKG QRS DURATION: 88 MS
EKG QTC CALCULATION (BAZETT): 448 MS
EKG R AXIS: 42 DEGREES
EKG T AXIS: -11 DEGREES
EKG VENTRICULAR RATE: 134 BPM
GFR AFRICAN AMERICAN: >60
GFR NON-AFRICAN AMERICAN: >60
GLUCOSE BLD-MCNC: 111 MG/DL (ref 70–99)
HCT VFR BLD CALC: 37.8 % (ref 42–52)
HEMOGLOBIN: 12.4 G/DL (ref 14–18)
MCH RBC QN AUTO: 28 PG (ref 27–31.3)
MCHC RBC AUTO-ENTMCNC: 33 % (ref 33–37)
MCV RBC AUTO: 84.8 FL (ref 80–100)
PDW BLD-RTO: 13 % (ref 11.5–14.5)
PLATELET # BLD: 196 K/UL (ref 130–400)
POTASSIUM SERPL-SCNC: 4.3 MEQ/L (ref 3.4–4.9)
RBC # BLD: 4.45 M/UL (ref 4.7–6.1)
SODIUM BLD-SCNC: 135 MEQ/L (ref 135–144)
WBC # BLD: 21.9 K/UL (ref 4.8–10.8)

## 2022-01-12 PROCEDURE — 6360000002 HC RX W HCPCS: Performed by: COLON & RECTAL SURGERY

## 2022-01-12 PROCEDURE — 93308 TTE F-UP OR LMTD: CPT

## 2022-01-12 PROCEDURE — 85027 COMPLETE CBC AUTOMATED: CPT

## 2022-01-12 PROCEDURE — 2700000000 HC OXYGEN THERAPY PER DAY

## 2022-01-12 PROCEDURE — 36415 COLL VENOUS BLD VENIPUNCTURE: CPT

## 2022-01-12 PROCEDURE — 6370000000 HC RX 637 (ALT 250 FOR IP): Performed by: COLON & RECTAL SURGERY

## 2022-01-12 PROCEDURE — 2580000003 HC RX 258: Performed by: COLON & RECTAL SURGERY

## 2022-01-12 PROCEDURE — 93325 DOPPLER ECHO COLOR FLOW MAPG: CPT

## 2022-01-12 PROCEDURE — 80048 BASIC METABOLIC PNL TOTAL CA: CPT

## 2022-01-12 PROCEDURE — 93320 DOPPLER ECHO COMPLETE: CPT

## 2022-01-12 PROCEDURE — 99024 POSTOP FOLLOW-UP VISIT: CPT | Performed by: COLON & RECTAL SURGERY

## 2022-01-12 PROCEDURE — 1210000000 HC MED SURG R&B

## 2022-01-12 PROCEDURE — 99222 1ST HOSP IP/OBS MODERATE 55: CPT | Performed by: INTERNAL MEDICINE

## 2022-01-12 RX ORDER — PANTOPRAZOLE SODIUM 40 MG/1
40 TABLET, DELAYED RELEASE ORAL
Status: DISCONTINUED | OUTPATIENT
Start: 2022-01-12 | End: 2022-01-17 | Stop reason: HOSPADM

## 2022-01-12 RX ORDER — CALCIUM CARBONATE 200(500)MG
500 TABLET,CHEWABLE ORAL 3 TIMES DAILY PRN
Status: DISCONTINUED | OUTPATIENT
Start: 2022-01-12 | End: 2022-01-17 | Stop reason: HOSPADM

## 2022-01-12 RX ORDER — KETOROLAC TROMETHAMINE 30 MG/ML
30 INJECTION, SOLUTION INTRAMUSCULAR; INTRAVENOUS EVERY 6 HOURS PRN
Status: DISPENSED | OUTPATIENT
Start: 2022-01-12 | End: 2022-01-14

## 2022-01-12 RX ADMIN — PIPERACILLIN SODIUM AND TAZOBACTAM SODIUM 3375 MG: 3; .375 INJECTION, POWDER, LYOPHILIZED, FOR SOLUTION INTRAVENOUS at 13:11

## 2022-01-12 RX ADMIN — OXYCODONE AND ACETAMINOPHEN 2 TABLET: 5; 325 TABLET ORAL at 18:20

## 2022-01-12 RX ADMIN — Medication 10 ML: at 11:15

## 2022-01-12 RX ADMIN — ANTACID TABLETS 500 MG: 500 TABLET, CHEWABLE ORAL at 18:20

## 2022-01-12 RX ADMIN — NALOXEGOL OXALATE 25 MG: 12.5 TABLET, FILM COATED ORAL at 08:17

## 2022-01-12 RX ADMIN — ENOXAPARIN SODIUM 40 MG: 100 INJECTION SUBCUTANEOUS at 08:17

## 2022-01-12 RX ADMIN — PANTOPRAZOLE SODIUM 40 MG: 40 TABLET, DELAYED RELEASE ORAL at 16:25

## 2022-01-12 RX ADMIN — PIPERACILLIN SODIUM AND TAZOBACTAM SODIUM 3375 MG: 3; .375 INJECTION, POWDER, LYOPHILIZED, FOR SOLUTION INTRAVENOUS at 20:21

## 2022-01-12 RX ADMIN — SODIUM CHLORIDE: 9 INJECTION, SOLUTION INTRAVENOUS at 13:11

## 2022-01-12 RX ADMIN — PIPERACILLIN SODIUM AND TAZOBACTAM SODIUM 3375 MG: 3; .375 INJECTION, POWDER, LYOPHILIZED, FOR SOLUTION INTRAVENOUS at 04:30

## 2022-01-12 RX ADMIN — OXYCODONE AND ACETAMINOPHEN 2 TABLET: 5; 325 TABLET ORAL at 11:13

## 2022-01-12 ASSESSMENT — PAIN SCALES - GENERAL
PAINLEVEL_OUTOF10: 9
PAINLEVEL_OUTOF10: 9
PAINLEVEL_OUTOF10: 2
PAINLEVEL_OUTOF10: 7

## 2022-01-12 NOTE — PROGRESS NOTES
Perfect Serve sent to Dr. Selmer Kawasaki - Patient c/o pain in the abd 10/10, abdomen very distended and tender, patient sweating and warm to touch but no temp 98.8. Nauseous at times. unable to get relief from pain medication. Awaiting response. 1420 - Dr. Selmer Kawasaki states to utilize pain medications percocet and dilaudid and he will add Toradol as needed.

## 2022-01-12 NOTE — CONSULTS
Infectious Diseases Inpatient Consult Note      Reason for Consult:   Antibiotics management for appendiceal abscess  Primary Care Physician:  Rick Armenta MD  History Obtained From:   Pt, EPIC    Admit Date: 1/11/2022  Hospital Day: 2      HISTORY OF PRESENT ILLNESS:  This is a 40 y.o. male was admitted to [facility] ER  from home through ER with acute onset of severe right lower quadrant abdominal pain of 24 hours duration. Was found to have perforated appendicitis with by CT. Had laparoscopic appendectomy with 4 cm abscess reported. Patient has been febrile. Was started on IV Zosyn. Has persistent diffuse abdominal pain. No bowel movements or flatus. Had constipation for 2 days prior to admission. Patient was febrile and tachycardic on admission. Has severe leukocytosis. Intra-Op cultures positive for gram-negative rods with pending identification and susceptibility  Past medical surgical and social history were reviewed and are as detailed below  Patient denies any history of heart disease or heart murmur  History reviewed. No pertinent past medical history. Past Surgical History:   Procedure Laterality Date    LAPAROSCOPIC APPENDECTOMY N/A 1/11/2022    LAPAROSCOPIC APPENDECTOMY, PATIENT COMING FROM [place] performed by Mark Marley MD at [facility]       Current Medications:     sodium chloride flush  5-40 mL IntraVENous 2 times per day    enoxaparin  40 mg SubCUTAneous Daily    naloxegol  25 mg Oral Daily    piperacillin-tazobactam  3,375 mg IntraVENous Q8H       Allergies:  Patient has no known allergies.     Social History     Socioeconomic History    Marital status: Single     Spouse name: Not on file    Number of children: Not on file    Years of education: Not on file    Highest education level: Not on file   Occupational History    Not on file   Tobacco Use    Smoking status: Current Every Day Smoker     Packs/day: 0.50     Types: Cigarettes    Smokeless tobacco: Never Used Substance and Sexual Activity    Alcohol use: Yes    Drug use: Never    Sexual activity: Not on file   Other Topics Concern    Not on file   Social History Narrative    Not on file     Social Determinants of Health     Financial Resource Strain:     Difficulty of Paying Living Expenses: Not on file   Food Insecurity:     Worried About Running Out of Food in the Last Year: Not on file    Kay of Food in the Last Year: Not on file   Transportation Needs:     Lack of Transportation (Medical): Not on file    Lack of Transportation (Non-Medical): Not on file   Physical Activity:     Days of Exercise per Week: Not on file    Minutes of Exercise per Session: Not on file   Stress:     Feeling of Stress : Not on file   Social Connections:     Frequency of Communication with Friends and Family: Not on file    Frequency of Social Gatherings with Friends and Family: Not on file    Attends [employer] Services: Not on file    Active Member of [address] [place] [address] or Organizations: Not on file    Attends Club or Organization Meetings: Not on file    Marital Status: Not on file   Intimate Partner Violence:     Fear of Current or Ex-Partner: Not on file    Emotionally Abused: Not on file    Physically Abused: Not on file    Sexually Abused: Not on file   Housing Stability:     Unable to Pay for Housing in the Last Year: Not on file    Number of [place] in the Last Year: Not on file    Unstable Housing in the Last Year: Not on file         Family History:   No family history on file.     Review of Systems  14 system review is negative other than HPI    Physical Exam  Vitals:    01/11/22 2105 01/11/22 2213 01/12/22 0439 01/12/22 0734   BP: 108/66 114/62 126/75 120/72   Pulse: 114 116 100 109   Resp:    18   Temp:  99.9 °F (37.7 °C) 98.4 °F (36.9 °C) 98.1 °F (36.7 °C)   TempSrc:    Oral   SpO2:  95% 96% 97%   Weight:       Height:         General Appearance: alert and oriented to person, place and time, well-developed and well-nourished, in no acute distress, on room air  Skin: warm and dry, no rash. Head: normocephalic and atraumatic  Eyes: extraocular eye movements intact, conjunctivae normal, anicteric sclerae  ENT: Normal mucous membranes. No thrush  Lungs: normal respiratory effort, Clear Lungs, no rhonchi, no crackles, no wheezes  Heart:RRR, nl F9/W1, systolic ejection murmur grade 3/6 best heard at the apex and left sternal border  Abdomen: Distended with absent bowel sounds, intact dressing over the laparoscopy site  NEUROLOGICAL: alert and oriented x 3, no focal deficits  No leg edema  No erythema, no warmth, no tenderness        DATA:    Lab Results   Component Value Date    WBC 21.9 (H) 01/12/2022    HGB 12.4 (L) 01/12/2022    HCT 37.8 (L) 01/12/2022    MCV 84.8 01/12/2022     01/12/2022     Lab Results   Component Value Date    CREATININE 0.95 01/12/2022    BUN 15 01/12/2022     01/12/2022    K 4.3 01/12/2022     01/12/2022    CO2 25 01/12/2022       Hepatic Function Panel:   Lab Results   Component Value Date    ALKPHOS 92 01/11/2022    ALT 46 01/11/2022    AST 16 01/11/2022    PROT 7.8 01/11/2022    BILITOT 1.0 01/11/2022    LABALBU 4.7 01/11/2022   CT abdomen pelvis      Impression       1. Acute appendicitis with a perforated appendix is noted. There is also an appendicolith.       2. No evidence for bowel obstruction. No hydronephrosis is seen. Nonobstructing nephrolith is seen on the left.    3. Cholelithiasis       IMPRESSION:    · Acute perforated appendicitis with abscess  · Gram-negative veda infection  · New heart murmur    Patient Active Problem List   Diagnosis    Appendicitis with perforation       PLAN:  · IV Zosyn  · Check and follow-up cultures and accordingly adjust antibiotic therapy  · 2D echo for loud heart murmur, new finding  · Follow-up CBC BMP  · Patient will probably need IV antibiotics on discharge    Discussed with patient     Priscilla Ya MD

## 2022-01-12 NOTE — CARE COORDINATION
PER IV BENEFIT CHECK , PATIENT WITH NO INSURANCE, OVER INCOME FOR MEDICAID , IV ABX WILL BE 68$/ DAY

## 2022-01-12 NOTE — CARE COORDINATION
Dennie Hensen Case Management Initial Discharge Assessment    Met with Patient to discuss discharge plan. PCP: No primary care provider on file. Date of Last Visit:     If no PCP, list provided? Yes / appt. To be made with [address]    Discharge Planning    Living Arrangements: independently at home    Who do you live with? Alone     Who helps you with your care:  Friend, Hay Deluna will help with IV/ ATB    If lives at home:     Do you have any barriers navigating in your home? no    Patient can perform ADL? Yes    Current Services (outpatient and in home) :  None    Dialysis: No    Is transportation available to get to your appointments? Yes    DME Equipment:  no    Respiratory equipment: None    Respiratory provider:  no     Pharmacy:  yes - [facility] in [address] [facility] with Medication Assistance Program?  No      Patient agreeable to [facility]? Yes, [facility]    Patient agreeable to SNF/Rehab? No    Other discharge needs identified? N/A    Does Patient Have a High-Risk for Readmission Diagnosis (CHF, PN, MI, COPD)? No         Initial Discharge Plan? (Note: please see concurrent daily documentation for any updates after initial note). Patient is alert, oriented and pleasant in conversation. He lives alone in [facility]. He is supported well by his friend, Hay Kinneylashawn who will assist with helping patient with IV/ATB upon discharge. Patient plans to return home. Patient does not have PCP. PCP list provided to patient- appt. To be scheduled with [facility].      Readmission Risk              Risk of Unplanned Readmission:  [address] [place] [address]  Electronically signed by MC Baxter, GIA on 1/12/2022 at 11:49 AM

## 2022-01-12 NOTE — PROGRESS NOTES
Pt Name: Moira Cox Record Number: [id number]  Date of Birth 1975   Admit date 2022  9:35 AM  Today's Date: 2022     ASSESSMENT  1. Hospital day # 1  2 postop day #1 laparoscopic appendectomy and pelvic washout for perforated necrotic appendicitis    PLAN  1. Advance diet  2. Decrease IV fluids  3. ID consulted for assistance with evaluation for adjuvant antibiotics    SUBJECTIVE  Chief complaint: Follow-up appendectomy  Afebrile, vital signs are stable. He denies any nausea or vomiting, passing flatus. He is tolerating a ADULT DIET; Regular; Low Fiber. His pain is well controlled on current medications. He has been ambulating in the halls. has no past medical history on file. CURRENT MEDS  Scheduled Meds:   sodium chloride flush  5-40 mL IntraVENous 2 times per day    enoxaparin  40 mg SubCUTAneous Daily    naloxegol  25 mg Oral Daily    piperacillin-tazobactam  3,375 mg IntraVENous Q8H     Continuous Infusions:   sodium chloride      sodium chloride 125 mL/hr at 221     PRN Meds:.fentanNYL, HYDROmorphone, sodium chloride flush, sodium chloride, ondansetron **OR** ondansetron, oxyCODONE-acetaminophen **OR** oxyCODONE-acetaminophen, HYDROmorphone **OR** HYDROmorphone, acetaminophen    OBJECTIVE  CURRENT VITALS:  height is 6' (1.829 m) and weight is 210 lb (95.3 kg). His temperature is 98.4 °F (36.9 °C). His blood pressure is 126/75 and his pulse is 100. His respiration is 19 and oxygen saturation is 96%.    Temperature Range (24h):Temp: 98.4 °F (36.9 °C) Temp  Av.1 °F (38.4 °C)  Min: 97 °F (36.1 °C)  Max: 101.5 °F (38.6 °C)  BP Range (75S): Systolic (25AXI), VWF:204 , Min:95 , ZEE:580     Diastolic (72JGC), XZ, Min:49, Max:96    Pulse Range (24h): Pulse  Av.9  Min: 100  Max: 140  Respiration Range (24h): Resp  Av.9  Min: 0  Max: 28    GENERAL: alert, no distress  LUNGS: clear to ausculation, without wheezes, rales or rhonci  HEART: normal rate and regular rhythm  ABDOMEN: non-distended, dressings dry, bowel sounds present in all 4 quadrants and no guarding or peritoneal signs  EXTERMITY: no cyanosis, clubbing or edema  In: 1000 [I.V.:1000]  Out: 600 [Urine:600]      LABS  Recent Labs     01/11/22 0447 01/12/22  0624   WBC 17.9* 21.9*   HGB 15.7 12.4*   HCT 45.6 37.8*    196   * 135   K 4.0 4.3   CL 95 101   CO2 25 25   BUN 9 15   CREATININE 1.06 0.95   CALCIUM 9.7 9.4      No results for input(s): PTT, INR in the last 72 hours. Invalid input(s): PT  Recent Labs     01/11/22 0447   AST 16   ALT 46*   BILITOT 1.0*   LIPASE 19       RADIOLOGY  CT ABDOMEN PELVIS WO CONTRAST Additional Contrast? None    Result Date: 1/11/2022  COMPARISON: None available. HISTORY:  abdominal pain COMMENTS:  stomach pain TECHNIQUE: procedure Thin slice spiral CT was performed from the lung bases through the iliac crests without contrast administration. Contrast enhancement was not employed due to clinician's order. Sagittal and coronal reconstructions were also performed. FINDINGS: Images through the lung bases demonstrate no infiltrates or effusions. Pericecal stranding is seen. A 5 mm stone is seen in the appendix and the appendix is dilated up to approximately 16 mm. A tiny focus of air is seen adjacent to the appendix (series 2, image 76. Free fluid is seen posteriorly in the pelvis. A 2 cm stone is seen in the gallbladder. Non-contrast images of the liver, pancreas, spleen, adrenals are unremarkable. A 2 mm nonobstructing stone is seen in the left kidney. No hydro-nephrosis, renal calculi or marc-nephric fluid seen in the right kidney. .  No aneurysm  is visualized. Calcifications are seen in the prostate gland. Also a small right inguinal hernia is seen that contains fat. There is a Retana catheter seen in the bladder. There is also some air which is likely due to instrumentation. The lumbar spine is unremarkable.  In the mid thoracic spine degenerative changes are seen at multiple levels. No destructive bony lesions are evident. 1. Acute appendicitis with a perforated appendix is noted. There is also an appendicolith. 2. No evidence for bowel obstruction. No hydronephrosis is seen. Nonobstructing nephrolith is seen on the left. 3. Cholelithiasis  All CT scans at this facility use dose modulation, iterative reconstruction, and/or weight based dosing when appropriate to reduce radiation dose to as low as reasonably achievable.     Electronically signed by Beth Praker MD on 1/12/2022 at 11:01 AM

## 2022-01-12 NOTE — OP NOTE
[address]                      [address], [address] [facility]                                OPERATIVE REPORT    PATIENT NAME: Lizbet Whitt                         :        1975  MED REC NO:   [id number]                            ROOM:       Y030  ACCOUNT NO:   [de-identified]                           ADMIT DATE: 2022  PROVIDER:     Kerry Corrales MD    DATE OF PROCEDURE:  2022    PREOPERATIVE DIAGNOSIS:  Acute appendicitis. POSTOPERATIVE DIAGNOSIS:  Perforated necrotic appendicitis. PROCEDURE PERFORMED:  Laparoscopic appendectomy with abdominal washout. SURGEON:  Kerry Corrales MD    ASSISTANT:  Ms. Preeti Gill. ANESTHESIA:  1. General endotracheal anesthesia. 2.  Bilateral TAP block. ESTIMATED BLOOD LOSS:  25 mL. SPECIMEN:  1. Appendix. 2.  Pelvic abscess cultures. COMPLICATIONS:  None. INDICATIONS:  A 16-year-old male with appendicitis, possible perforation  based on CAT scan. He was transferred to our hospital for surgical  treatment. Risks and benefits of laparoscopic possible open  appendectomy outlined. Risks of the procedure including infection,  bleeding, damage to surrounding intestine, abscess formation and  persistent pain was discussed. Despite these risks, he wished to  proceed. Consent obtained. OPERATIVE PROCEDURE:  He was taken to the operating room, placed in the  supine position. General endotracheal anesthesia was administered. He  had a bilateral TAP block placed preoperatively. He received Zosyn  preoperatively. His abdomen was prepped and draped with a  ChloraPrep-containing solution. A time-out was taken for appropriate  verification. A 5-mm optical trocar was placed in the infraumbilical position and  pneumoperitoneum was established. The right and left lower quadrant ports were placed under direct  visualization. There was a large pelvic abscess present.   I obtain cultures and then  irrigated and aspirated all the pus from his pelvis. The appendix was visualized and was necrotic. It was held with one of  the graspers and a plane was created between the appendix and the  mesoappendix. A laparoscopic stapling device with the bowel load was  used to transect the appendix at the cecal base. The appendiceal  mesentery was clamped and ligated with the same stapler using a vascular  load. The appendix was placed in an EndoCatch bag and brought out the left  lower quadrant port site and sent to Pathology in formalin for permanent  section. Irrisept was added to the irrigation and was used to irrigate the pelvis  thoroughly as well as the right lower quadrant. I was able to aspirate  all the fluid and all the contamination. The staple lines were intact. The remainder of the laparoscopic evaluation of the abdomen was  unremarkable. After thoroughly evaluating for complete irrigation, the bowel and  omentum was placed back in normal anatomic position. There was no  bleeding seen and all the fluid in the pelvis and right lower quadrant  was irrigated and aspirated thoroughly. Pneumoperitoneum was released at this time and the trocars were all  removed. The fascia of the left lower quadrant was closed with interrupted 0  Vicryl suture. The skin incisions were closed with staples and  Band-Aids were applied as dressing. Prior to and after closure, the lap, needle, instrument counts were all  correct. Retana catheter was removed. The patient was extubated and taken to  recovery for postop monitoring.         Brittanie Rojo MD    D: 01/11/2022 13:57:57       T: 01/11/2022 14:01:57     [id number]  Job#: [id number]     Doc#: [id number]    CC:

## 2022-01-12 NOTE — PROGRESS NOTES
admission and assessment completed. Pt A&ox4 morgan to make needs known. Pt had c/o post-op abdominal pain, 5/10, medicated, see MAR. Steady gait noted. Pt educated to call for assistance. Will continue to monitor.

## 2022-01-12 NOTE — PROGRESS NOTES
Patient currently resting    Had echocardiogram will followed up on upon completion and reading. Dilaudid available for pain as well as Toradol. Patient experiencing symptoms of ileus secondary to peritonitis and pelvic abscess    Talked with girlfriend regarding adjuvant antibiotics as well as continued treatment. All questions were answered.

## 2022-01-13 ENCOUNTER — APPOINTMENT (OUTPATIENT)
Dept: INTERVENTIONAL RADIOLOGY/VASCULAR | Age: 47
DRG: 853 | End: 2022-01-13
Attending: COLON & RECTAL SURGERY

## 2022-01-13 LAB
ANION GAP SERPL CALCULATED.3IONS-SCNC: 12 MEQ/L (ref 9–15)
BASOPHILS ABSOLUTE: 0.1 K/UL (ref 0–0.2)
BASOPHILS RELATIVE PERCENT: 0.6 %
BUN BLDV-MCNC: 18 MG/DL (ref 6–20)
CALCIUM SERPL-MCNC: 9.2 MG/DL (ref 8.5–9.9)
CHLORIDE BLD-SCNC: 97 MEQ/L (ref 95–107)
CO2: 20 MEQ/L (ref 20–31)
CREAT SERPL-MCNC: 0.94 MG/DL (ref 0.7–1.2)
EOSINOPHILS ABSOLUTE: 0.3 K/UL (ref 0–0.7)
EOSINOPHILS RELATIVE PERCENT: 2.1 %
GFR AFRICAN AMERICAN: >60
GFR NON-AFRICAN AMERICAN: >60
GLUCOSE BLD-MCNC: 171 MG/DL (ref 70–99)
HCT VFR BLD CALC: 35.3 % (ref 42–52)
HEMOGLOBIN: 11.5 G/DL (ref 14–18)
LYMPHOCYTES ABSOLUTE: 1.5 K/UL (ref 1–4.8)
LYMPHOCYTES RELATIVE PERCENT: 10.5 %
MCH RBC QN AUTO: 27.6 PG (ref 27–31.3)
MCHC RBC AUTO-ENTMCNC: 32.7 % (ref 33–37)
MCV RBC AUTO: 84.6 FL (ref 80–100)
MONOCYTES ABSOLUTE: 0.9 K/UL (ref 0.2–0.8)
MONOCYTES RELATIVE PERCENT: 6 %
NEUTROPHILS ABSOLUTE: 12 K/UL (ref 1.4–6.5)
NEUTROPHILS RELATIVE PERCENT: 80.8 %
PDW BLD-RTO: 13 % (ref 11.5–14.5)
PLATELET # BLD: 222 K/UL (ref 130–400)
POTASSIUM SERPL-SCNC: 3.6 MEQ/L (ref 3.4–4.9)
RBC # BLD: 4.17 M/UL (ref 4.7–6.1)
SODIUM BLD-SCNC: 129 MEQ/L (ref 135–144)
WBC # BLD: 14.8 K/UL (ref 4.8–10.8)

## 2022-01-13 PROCEDURE — 99024 POSTOP FOLLOW-UP VISIT: CPT | Performed by: COLON & RECTAL SURGERY

## 2022-01-13 PROCEDURE — 36410 VNPNXR 3YR/> PHY/QHP DX/THER: CPT

## 2022-01-13 PROCEDURE — 2500000003 HC RX 250 WO HCPCS: Performed by: INTERNAL MEDICINE

## 2022-01-13 PROCEDURE — 80048 BASIC METABOLIC PNL TOTAL CA: CPT

## 2022-01-13 PROCEDURE — 2580000003 HC RX 258: Performed by: INTERNAL MEDICINE

## 2022-01-13 PROCEDURE — 85025 COMPLETE CBC W/AUTO DIFF WBC: CPT

## 2022-01-13 PROCEDURE — 2580000003 HC RX 258: Performed by: COLON & RECTAL SURGERY

## 2022-01-13 PROCEDURE — 2700000000 HC OXYGEN THERAPY PER DAY

## 2022-01-13 PROCEDURE — 05HB33Z INSERTION OF INFUSION DEVICE INTO RIGHT BASILIC VEIN, PERCUTANEOUS APPROACH: ICD-10-PCS | Performed by: COLON & RECTAL SURGERY

## 2022-01-13 PROCEDURE — 99232 SBSQ HOSP IP/OBS MODERATE 35: CPT | Performed by: INTERNAL MEDICINE

## 2022-01-13 PROCEDURE — 1210000000 HC MED SURG R&B

## 2022-01-13 PROCEDURE — 36415 COLL VENOUS BLD VENIPUNCTURE: CPT

## 2022-01-13 PROCEDURE — 6360000002 HC RX W HCPCS: Performed by: COLON & RECTAL SURGERY

## 2022-01-13 PROCEDURE — 2709999900 IR MIDLINE CATH

## 2022-01-13 PROCEDURE — 76937 US GUIDE VASCULAR ACCESS: CPT

## 2022-01-13 PROCEDURE — 6370000000 HC RX 637 (ALT 250 FOR IP): Performed by: COLON & RECTAL SURGERY

## 2022-01-13 PROCEDURE — 6370000000 HC RX 637 (ALT 250 FOR IP): Performed by: SURGERY

## 2022-01-13 RX ORDER — BISACODYL 10 MG
10 SUPPOSITORY, RECTAL RECTAL NIGHTLY
Status: DISPENSED | OUTPATIENT
Start: 2022-01-13 | End: 2022-01-16

## 2022-01-13 RX ORDER — SODIUM CHLORIDE 0.9 % (FLUSH) 0.9 %
5-40 SYRINGE (ML) INJECTION EVERY 12 HOURS SCHEDULED
Status: DISCONTINUED | OUTPATIENT
Start: 2022-01-13 | End: 2022-01-17 | Stop reason: HOSPADM

## 2022-01-13 RX ORDER — SODIUM CHLORIDE 9 MG/ML
250 INJECTION, SOLUTION INTRAVENOUS ONCE
Status: COMPLETED | OUTPATIENT
Start: 2022-01-13 | End: 2022-01-13

## 2022-01-13 RX ORDER — SODIUM CHLORIDE 0.9 % (FLUSH) 0.9 %
5-40 SYRINGE (ML) INJECTION PRN
Status: DISCONTINUED | OUTPATIENT
Start: 2022-01-13 | End: 2022-01-17 | Stop reason: HOSPADM

## 2022-01-13 RX ORDER — LIDOCAINE HYDROCHLORIDE 20 MG/ML
5 INJECTION, SOLUTION INFILTRATION; PERINEURAL ONCE
Status: COMPLETED | OUTPATIENT
Start: 2022-01-13 | End: 2022-01-13

## 2022-01-13 RX ORDER — SODIUM CHLORIDE 9 MG/ML
25 INJECTION, SOLUTION INTRAVENOUS PRN
Status: DISCONTINUED | OUTPATIENT
Start: 2022-01-13 | End: 2022-01-17 | Stop reason: HOSPADM

## 2022-01-13 RX ADMIN — PANTOPRAZOLE SODIUM 40 MG: 40 TABLET, DELAYED RELEASE ORAL at 05:14

## 2022-01-13 RX ADMIN — ANTACID TABLETS 500 MG: 500 TABLET, CHEWABLE ORAL at 19:36

## 2022-01-13 RX ADMIN — PIPERACILLIN SODIUM AND TAZOBACTAM SODIUM 3375 MG: 3; .375 INJECTION, POWDER, LYOPHILIZED, FOR SOLUTION INTRAVENOUS at 14:05

## 2022-01-13 RX ADMIN — SODIUM CHLORIDE: 9 INJECTION, SOLUTION INTRAVENOUS at 05:13

## 2022-01-13 RX ADMIN — PIPERACILLIN SODIUM AND TAZOBACTAM SODIUM 3375 MG: 3; .375 INJECTION, POWDER, LYOPHILIZED, FOR SOLUTION INTRAVENOUS at 05:13

## 2022-01-13 RX ADMIN — OXYCODONE AND ACETAMINOPHEN 1 TABLET: 5; 325 TABLET ORAL at 12:37

## 2022-01-13 RX ADMIN — ANTACID TABLETS 500 MG: 500 TABLET, CHEWABLE ORAL at 12:37

## 2022-01-13 RX ADMIN — OXYCODONE AND ACETAMINOPHEN 2 TABLET: 5; 325 TABLET ORAL at 05:13

## 2022-01-13 RX ADMIN — SODIUM CHLORIDE: 9 INJECTION, SOLUTION INTRAVENOUS at 19:40

## 2022-01-13 RX ADMIN — SODIUM CHLORIDE 250 ML: 9 INJECTION, SOLUTION INTRAVENOUS at 18:01

## 2022-01-13 RX ADMIN — PIPERACILLIN SODIUM AND TAZOBACTAM SODIUM 3375 MG: 3; .375 INJECTION, POWDER, LYOPHILIZED, FOR SOLUTION INTRAVENOUS at 22:00

## 2022-01-13 RX ADMIN — LIDOCAINE HYDROCHLORIDE 5 ML: 20 INJECTION, SOLUTION INFILTRATION; PERINEURAL at 17:59

## 2022-01-13 RX ADMIN — BISACODYL 10 MG: 10 SUPPOSITORY RECTAL at 23:08

## 2022-01-13 RX ADMIN — ACETAMINOPHEN 500 MG: 500 TABLET ORAL at 19:36

## 2022-01-13 RX ADMIN — PANTOPRAZOLE SODIUM 40 MG: 40 TABLET, DELAYED RELEASE ORAL at 17:17

## 2022-01-13 ASSESSMENT — PAIN DESCRIPTION - PROGRESSION: CLINICAL_PROGRESSION: NOT CHANGED

## 2022-01-13 ASSESSMENT — PAIN SCALES - GENERAL
PAINLEVEL_OUTOF10: 7
PAINLEVEL_OUTOF10: 4
PAINLEVEL_OUTOF10: 7
PAINLEVEL_OUTOF10: 0

## 2022-01-13 ASSESSMENT — PAIN DESCRIPTION - FREQUENCY: FREQUENCY: CONTINUOUS

## 2022-01-13 ASSESSMENT — PAIN DESCRIPTION - DESCRIPTORS: DESCRIPTORS: CRAMPING

## 2022-01-13 ASSESSMENT — PAIN DESCRIPTION - LOCATION: LOCATION: ABDOMEN

## 2022-01-13 ASSESSMENT — PAIN DESCRIPTION - ONSET: ONSET: ON-GOING

## 2022-01-13 ASSESSMENT — PAIN DESCRIPTION - PAIN TYPE: TYPE: SURGICAL PAIN

## 2022-01-13 NOTE — PROGRESS NOTES
Infectious Diseases Inpatient Progress Note          HISTORY OF PRESENT ILLNESS:  Follow up acute perforated appendicitis with abscess on IV Zosyn, well tolerated. Patient has overall clinical improvement with decreased fevers and decreasing leukocytosis. Tolerating diet. Decreased abdominal pain and distention. Positive flatus. Current Medications:     pantoprazole  40 mg Oral BID AC    sodium chloride flush  5-40 mL IntraVENous 2 times per day    enoxaparin  40 mg SubCUTAneous Daily    naloxegol  25 mg Oral Daily    piperacillin-tazobactam  3,375 mg IntraVENous Q8H       Allergies:  Patient has no known allergies. Review of Systems  14 system review is negative other than HPI    Physical Exam  Vitals:    01/12/22 0439 01/12/22 0734 01/12/22 1954 01/13/22 0732   BP: 126/75 120/72 124/75 116/69   Pulse: 100 109 113 106   Resp:  18 18 18   Temp: 98.4 °F (36.9 °C) 98.1 °F (36.7 °C) 98.4 °F (36.9 °C) 98.8 °F (37.1 °C)   TempSrc:  Oral Oral Oral   SpO2: 96% 97% 98% 95%   Weight:       Height:         General Appearance: alert and oriented to person, place and time, well-developed and well-nourished, in no acute distress  Skin: warm and dry, no rash. Head: normocephalic and atraumatic  Eyes: anicteric sclerae  ENT: oropharynx clear and moist with normal mucous membranes.  No oral thrush  Lungs: normal respiratory effort, clear lungs  Heart loud systolic ejection murmur at the apex  Abdomen: soft, no tenderness, mild distention, decreased bowel sounds  No leg edema  No erythema, no tenderness      DATA:    Lab Results   Component Value Date    WBC 14.8 (H) 01/13/2022    HGB 11.5 (L) 01/13/2022    HCT 35.3 (L) 01/13/2022    MCV 84.6 01/13/2022     01/13/2022     Lab Results   Component Value Date    CREATININE 0.94 01/13/2022    BUN 18 01/13/2022     (L) 01/13/2022    K 3.6 01/13/2022    CL 97 01/13/2022    CO2 20 01/13/2022       Hepatic Function Panel:  Lab Results   Component Value Date

## 2022-01-13 NOTE — PROGRESS NOTES
Awake and alert. Denies need for analgesic at this time. Abdomen softly distended with hypoactive BS. \"Its way less bloated than before. \"  Up independently in room. Denies nausea. Reports heartburn has improved with meds. SCDs on BLE.

## 2022-01-13 NOTE — PROGRESS NOTES
Physician Progress Note      Brynn Rutledge  CSN #:                  [id number]  :                       1975  ADMIT DATE:       2022 9:35 AM  DISCH DATE:  RESPONDING  PROVIDER #:        Citlaly REYNOSO MD          QUERY TEXT:    Patient admitted with  perforated necrotic appendicitis. Patient noted to have   temp 101.4  pulse 139  RR 22  WBC 17.9; Intra-Op cultures positive for   gram-negative veda. If possible, please document in the progress notes and   discharge summary if you are evaluating and /or treating any of the following: The medical record reflects the following:  Risk Factors: 40 yom Perforated necrotic appendicitis  gram negative veda   infection  Clinical Indicators: temp 101.4  pulse 139  RR 22    WBC 17.9  [facility] [place] showed evidence of appendicitis described as perforated w/   appendicolith' per Dr Armando Tesfaye consult 22: Intra-Op cultures positive for   gram-negative rods with pending identification and susceptibility  Treatment: IV lactated ringers  laparoscopic appendectomy IV Zosyn  2 d echo   for loud heart murmur new following  ID consult    Thank you,  Cheryl CHAU RN CDS  [phone] M-F 6am-2pm  Options provided:  -- Sepsis, present on admission  -- Other - I will add my own diagnosis  -- Disagree - Not applicable / Not valid  -- Disagree - Clinically unable to determine / Unknown  -- Refer to Clinical Documentation Reviewer    PROVIDER RESPONSE TEXT:    This patient has sepsis which was present on admission.     Query created by: Rhea Lloyd on 2022 2:03 PM      Electronically signed by:  Andreia Oneal MD 2022 2:18 PM

## 2022-01-13 NOTE — PROGRESS NOTES
Pt Name: Grant Juarez Record Number: [id number]  Date of Birth 1975   Admit date 2022  9:35 AM  Today's Date: 2022     ASSESSMENT  1. Hospital day # 2  2 postop day #2 laparoscopic appendectomy for perforated appendicitis  3. Passing gas with small bowel movement  4. Awaiting final ID recommendation    PLAN  1. Ambulate  2. Check blood work today    [address]  Chief complaint: Follow-up appendectomy  Afebrile, vital signs are stable. He denies any nausea or vomiting, passing flatus with bowel function. He is tolerating a ADULT DIET; Regular; Low Fiber. His pain is well controlled on current medications. He has been ambulating in the halls. has no past medical history on file. CURRENT MEDS  Scheduled Meds:   pantoprazole  40 mg Oral BID AC    sodium chloride flush  5-40 mL IntraVENous 2 times per day    enoxaparin  40 mg SubCUTAneous Daily    naloxegol  25 mg Oral Daily    piperacillin-tazobactam  3,375 mg IntraVENous Q8H     Continuous Infusions:   sodium chloride      sodium chloride 75 mL/hr at 22 0513     PRN Meds:.ketorolac, calcium carbonate, sodium chloride flush, sodium chloride, ondansetron **OR** ondansetron, oxyCODONE-acetaminophen **OR** oxyCODONE-acetaminophen, HYDROmorphone **OR** HYDROmorphone, acetaminophen    OBJECTIVE  CURRENT VITALS:  height is 6' (1.829 m) and weight is 210 lb (95.3 kg). His oral temperature is 98.8 °F (37.1 °C). His blood pressure is 116/69 and his pulse is 106. His respiration is 18 and oxygen saturation is 95%.    Temperature Range (24h):Temp: 98.8 °F (37.1 °C) Temp  Av.6 °F (37 °C)  Min: 98.4 °F (36.9 °C)  Max: 98.8 °F (37.1 °C)  BP Range (66M): Systolic (14UGD), HDT:577 , Min:116 , CUF:684     Diastolic (22ASA), ADV:21, Min:69, Max:75    Pulse Range (24h): Pulse  Av.5  Min: 106  Max: 113  Respiration Range (24h): Resp  Av  Min: 18  Max: 18    GENERAL: alert, no distress  LUNGS: clear to ausculation, without wheezes, rales or rhonci  HEART: Tachycardia improved  ABDOMEN: soft, non-tender, abdomen still with distention but nontender, bowel sounds present in all 4 quadrants and no guarding or peritoneal signs  EXTERMITY: no cyanosis, clubbing or edema  In: 2448 [P.O.:360; I.V.:2088]  Out: 300 [Urine:300]  Date 01/13/22 0000 - 01/13/22 2359   Shift 0532-3587 6074-4024 6299-4067 24 Hour Total   INTAKE   P.O.(mL/kg/hr) 360(0.5)   360   I. V.(mL/kg) 1474(67.2)   2242(51.2)   Shift Total(mL/kg) 9079(56.6)   1112(81.4)   OUTPUT   Urine(mL/kg/hr) 300(0.4)   300   Shift Total(mL/kg) 300(3.1)   300(3.1)   Weight (kg) 95.3 95.3 95.3 95.3       LABS  Recent Labs     01/11/22  0447 01/12/22  0624   WBC 17.9* 21.9*   HGB 15.7 12.4*   HCT 45.6 37.8*    196   * 135   K 4.0 4.3   CL 95 101   CO2 25 25   BUN 9 15   CREATININE 1.06 0.95   CALCIUM 9.7 9.4      No results for input(s): PTT, INR in the last 72 hours. Invalid input(s): PT  Recent Labs     01/11/22  0447   AST 16   ALT 46*   BILITOT 1.0*   LIPASE 19       RADIOLOGY  CT ABDOMEN PELVIS WO CONTRAST Additional Contrast? None    Result Date: 1/11/2022  COMPARISON: None available. HISTORY:  abdominal pain COMMENTS:  stomach pain TECHNIQUE: procedure Thin slice spiral CT was performed from the lung bases through the iliac crests without contrast administration. Contrast enhancement was not employed due to clinician's order. Sagittal and coronal reconstructions were also performed. FINDINGS: Images through the lung bases demonstrate no infiltrates or effusions. Pericecal stranding is seen. A 5 mm stone is seen in the appendix and the appendix is dilated up to approximately 16 mm. A tiny focus of air is seen adjacent to the appendix (series 2, image 76. Free fluid is seen posteriorly in the pelvis. A 2 cm stone is seen in the gallbladder. Non-contrast images of the liver, pancreas, spleen, adrenals are unremarkable. A 2 mm nonobstructing stone is seen in the left kidney. No hydro-nephrosis, renal calculi or marc-nephric fluid seen in the right kidney. .  No aneurysm  is visualized. Calcifications are seen in the prostate gland. Also a small right inguinal hernia is seen that contains fat. There is a Retana catheter seen in the bladder. There is also some air which is likely due to instrumentation. The lumbar spine is unremarkable. In the mid thoracic spine degenerative changes are seen at multiple levels. No destructive bony lesions are evident. 1. Acute appendicitis with a perforated appendix is noted. There is also an appendicolith. 2. No evidence for bowel obstruction. No hydronephrosis is seen. Nonobstructing nephrolith is seen on the left. 3. Cholelithiasis  All CT scans at this facility use dose modulation, iterative reconstruction, and/or weight based dosing when appropriate to reduce radiation dose to as low as reasonably achievable. Limited 2D Echo w Doppler w Color Flow    Result Date: 1/12/2022  Transthoracic Echocardiography Report (TTE)  Demographics   Patient Name     Carlton Velez  Gender                Male   Patient Number   [id number]    Race                                                  Ethnicity   Visit Number     [id number]   Room Number           O731   Corporate ID                 Date of Study         01/12/2022   Accession Number [id number]  Referring Physician   Keven Delarosa MD   Date of Birth    1975  Sonographer           Piero Deluca   Age              40 year(s)  Interpreting          [facility]                               Physician             Tommy Chow  Procedure Type of Study   TTE procedure:2D-ECHO LIMITED STUDY. Procedure Date Date: 01/12/2022 Start: 12:52 PM Study Location: Portable Technical Quality: Adequate visualization Indications:Heart murmur.  Patient Status: Routine Height: 70 inches Weight: 210 pounds BSA: 2.13 m^2 BMI: 30.13 kg/m^2 BP: 120/72 mmHg  Conclusions   Summary  Normal left ventricle structure and function. Left ventricular ejection fraction is visually estimated at 60%. Patient tachycardic unable to determine accurate valvular disease, grossly  normal valve function   Signature   ----------------------------------------------------------------  Electronically signed by Chiki Andres(Interpreting physician)  on 01/12/2022 07:55 PM  ----------------------------------------------------------------   Findings  Left Ventricle Normal left ventricle structure and function. Left ventricular ejection fraction is visually estimated at 60%. Right Ventricle Normal right ventricle structure and function. Normal right ventricle systolic pressure. Left Atrium Normal left atrium. Right Atrium Normal right atrium. Mitral Valve Normal mitral valve structure and function. Tricuspid Valve Normal tricuspid valve structure and function. Aortic Valve Normal aortic valve structure and function. Pulmonic Valve Normal pulmonic valve structure and function. Pericardial Effusion No evidence of pericardial effusion. Pleural Effusion No evidence of pleural effusion. Aorta \ Miscellaneous Miscellaneous normal findings were found. M-Mode Measurements (cm)   LVIDd: 2.61 cm                         LVIDs: 1.95 cm  IVSd: 1.87 cm                          IVSs: 2.7 cm  LVPWd: 1.79 cm                         LVPWs: 1.38 cm  Rt. Vent.  Dimension: 2.27 cm           AO Root Dimension: 3.09 cm                                         ACS: 1.68 cm                                         LVOT: 1.81 cm  Doppler Measurements:   AV Peak Gradient: 75 mmHg              MV Peak E-Wave: 1.32 m/s  AV Mean Gradient: 32.57 mmHg           MV Peak A-Wave: 1.21 m/s  TR Velocity:3.92 m/s  TR Gradient:61.51 mmHg                                         Estimated RAP:5 mmHg                                         RVSP:66.51 mmHg  Valves  Mitral Valve   Peak E-Wave: 1.32 m/s              Peak A-Wave: 1.21 m/s  Mean Velocity: 1.16 m/s            E/A Ratio: 1.09  Mean Gradient: 6.39 mmHg           Peak Gradient: 6.99 mmHg                                     Deceleration Time: 78.4 msec  MR Velocity: 6.84 m/s   Aortic Valve   Peak Velocity: 4.33 m/s            Mean Velocity: 2.61 m/s  Peak Gradient: 75 mmHg             Mean Gradient: 32.57 mmHg  AV VTI: 57.5 cm   Cusp Separation: 1.68 cm   Tricuspid Valve   Estimated RVSP: 66.51 mmHg              Estimated RAP: 5 mmHg  TR Velocity: 3.92 m/s                   TR Gradient: 61.51 mmHg   Pulmonic Valve            Estimated PASP: 66.51 mmHg   LVOT   LVOT Diameter: 1.81 cm  Structures  Left Atrium   LA Volume/Index: 77.78 ml /37 m^2             LA Area: 25.08 cm^2   Left Ventricle   Diastolic Dimension: 8.32 cm          Systolic Dimension: 5.11 cm  Septum Diastolic: 8.61 cm             Septum Systolic: 2.7 cm  PW Diastolic: 5.29 cm                 PW Systolic: 2.06 cm                                        FS: 25.3 %  LV EDV/LV EDV Index: 24.82 ml/12 m^2  LV ESV/LV ESV Index: 11.87 ml/6 m^2  EF Calculated: 52.2 %   LVOT Diameter: 1.81 cm   Right Atrium   RA Systolic Pressure: 5 mmHg   Right Ventricle   Diastolic Dimension: 7.96 cm                                    RV Systolic Pressure: 59.93 mmHg  Aorta/ Miscellaneous Aorta   Aortic Root: 3.09 cm  LVOT Diameter: 1.81 cm      Electronically signed by Elvira Lehman MD on 1/13/2022 at 11:07 AM

## 2022-01-13 NOTE — CARE COORDINATION
PATIENT DOES NOT QUALIFY FOR MED. ASSIST PROGRAM, OVER INCOME. POSSIBLE DAILY ANTIBIOTIC AND OUTPATIENT INFUSION [facility].  WILL NEED FINAL RECOMMENDATION FROM I.D.

## 2022-01-14 ENCOUNTER — APPOINTMENT (OUTPATIENT)
Dept: CT IMAGING | Age: 47
DRG: 853 | End: 2022-01-14
Attending: COLON & RECTAL SURGERY

## 2022-01-14 PROCEDURE — 6370000000 HC RX 637 (ALT 250 FOR IP): Performed by: COLON & RECTAL SURGERY

## 2022-01-14 PROCEDURE — 36415 COLL VENOUS BLD VENIPUNCTURE: CPT

## 2022-01-14 PROCEDURE — 99232 SBSQ HOSP IP/OBS MODERATE 35: CPT | Performed by: INTERNAL MEDICINE

## 2022-01-14 PROCEDURE — 6360000002 HC RX W HCPCS: Performed by: COLON & RECTAL SURGERY

## 2022-01-14 PROCEDURE — 87040 BLOOD CULTURE FOR BACTERIA: CPT

## 2022-01-14 PROCEDURE — 2580000003 HC RX 258: Performed by: COLON & RECTAL SURGERY

## 2022-01-14 PROCEDURE — 1210000000 HC MED SURG R&B

## 2022-01-14 PROCEDURE — 2700000000 HC OXYGEN THERAPY PER DAY

## 2022-01-14 PROCEDURE — 99024 POSTOP FOLLOW-UP VISIT: CPT | Performed by: COLON & RECTAL SURGERY

## 2022-01-14 PROCEDURE — 74177 CT ABD & PELVIS W/CONTRAST: CPT

## 2022-01-14 PROCEDURE — 6360000004 HC RX CONTRAST MEDICATION: Performed by: COLON & RECTAL SURGERY

## 2022-01-14 RX ADMIN — SODIUM CHLORIDE: 9 INJECTION, SOLUTION INTRAVENOUS at 09:48

## 2022-01-14 RX ADMIN — PIPERACILLIN SODIUM AND TAZOBACTAM SODIUM 3375 MG: 3; .375 INJECTION, POWDER, LYOPHILIZED, FOR SOLUTION INTRAVENOUS at 21:48

## 2022-01-14 RX ADMIN — NALOXEGOL OXALATE 25 MG: 12.5 TABLET, FILM COATED ORAL at 09:48

## 2022-01-14 RX ADMIN — SODIUM CHLORIDE: 9 INJECTION, SOLUTION INTRAVENOUS at 21:48

## 2022-01-14 RX ADMIN — ENOXAPARIN SODIUM 40 MG: 100 INJECTION SUBCUTANEOUS at 15:18

## 2022-01-14 RX ADMIN — PIPERACILLIN SODIUM AND TAZOBACTAM SODIUM 3375 MG: 3; .375 INJECTION, POWDER, LYOPHILIZED, FOR SOLUTION INTRAVENOUS at 05:13

## 2022-01-14 RX ADMIN — IOPAMIDOL 100 ML: 612 INJECTION, SOLUTION INTRAVENOUS at 10:41

## 2022-01-14 RX ADMIN — PIPERACILLIN SODIUM AND TAZOBACTAM SODIUM 3375 MG: 3; .375 INJECTION, POWDER, LYOPHILIZED, FOR SOLUTION INTRAVENOUS at 15:17

## 2022-01-14 RX ADMIN — ACETAMINOPHEN 500 MG: 500 TABLET ORAL at 09:48

## 2022-01-14 RX ADMIN — ACETAMINOPHEN 500 MG: 500 TABLET ORAL at 20:35

## 2022-01-14 RX ADMIN — KETOROLAC TROMETHAMINE 30 MG: 30 INJECTION, SOLUTION INTRAMUSCULAR at 05:14

## 2022-01-14 RX ADMIN — PANTOPRAZOLE SODIUM 40 MG: 40 TABLET, DELAYED RELEASE ORAL at 05:14

## 2022-01-14 ASSESSMENT — PAIN SCALES - GENERAL
PAINLEVEL_OUTOF10: 8
PAINLEVEL_OUTOF10: 3
PAINLEVEL_OUTOF10: 5
PAINLEVEL_OUTOF10: 8

## 2022-01-14 ASSESSMENT — PAIN DESCRIPTION - LOCATION: LOCATION: ABDOMEN

## 2022-01-14 ASSESSMENT — PAIN DESCRIPTION - PAIN TYPE: TYPE: ACUTE PAIN;SURGICAL PAIN

## 2022-01-14 NOTE — PROGRESS NOTES
At 2253 [employer] to Dr Alf Delgado regarding pt's abdominal distention and request for stool softener or other means \"to get me going\"  Orders rec'd for dulcolax suppository, which was given.

## 2022-01-14 NOTE — PROGRESS NOTES
Pt Name: Hoda Randhawa Record Number: [id number]  Date of Birth 1975   Admit date 2022  9:35 AM  Today's Date: 2022     ASSESSMENT  1. Hospital day # 3  2 postop day #3 laparoscopic appendectomy for perforated appendicitis  3. Persistent abdominal distention with some bowel function yesterday    PLAN  1. Continue Zosyn  2. Ambulate  3. CT abdomen and pelvis today    SUBJECTIVE  Chief complaint: Follow-up appendectomy  Afebrile, vital signs are stable. He denies any nausea or vomiting, passing flatus and occasional bowel movements He is tolerating a ADULT DIET; Regular; Low Fiber. His pain is well controlled on current medications. He has been ambulating in the halls. has no past medical history on file. CURRENT MEDS  Scheduled Meds:   sodium chloride flush  5-40 mL IntraVENous 2 times per day    bisacodyl  10 mg Rectal Nightly    pantoprazole  40 mg Oral BID AC    sodium chloride flush  5-40 mL IntraVENous 2 times per day    enoxaparin  40 mg SubCUTAneous Daily    naloxegol  25 mg Oral Daily    piperacillin-tazobactam  3,375 mg IntraVENous Q8H     Continuous Infusions:   sodium chloride      sodium chloride      sodium chloride 75 mL/hr at 22     PRN Meds:.sodium chloride flush, sodium chloride, ketorolac, calcium carbonate, sodium chloride flush, sodium chloride, ondansetron **OR** ondansetron, oxyCODONE-acetaminophen **OR** oxyCODONE-acetaminophen, HYDROmorphone **OR** HYDROmorphone, acetaminophen    OBJECTIVE  CURRENT VITALS:  height is 6' (1.829 m) and weight is 210 lb (95.3 kg). His oral temperature is 99.7 °F (37.6 °C). His blood pressure is 127/76 and his pulse is 120. His respiration is 17 and oxygen saturation is 97%.    Temperature Range (24h):Temp: 99.7 °F (37.6 °C) Temp  Av.7 °F (38.2 °C)  Min: 99.7 °F (37.6 °C)  Max: 101.7 °F (38.7 °C)  BP Range (02V): Systolic (92DOA), AOE:653 , Min:102 , ZLQ:658     Diastolic (61WGM), TDZ:83, Min:61, Max:76    Pulse Range (24h): Pulse  Av.5  Min: 120  Max: 127  Respiration Range (24h): Resp  Av.5  Min: 17  Max: 20    GENERAL: alert, no distress  LUNGS: clear to ausculation, without wheezes, rales or rhonci  HEART: normal rate and regular rhythm  ABDOMEN: soft, non-tender, nontender distention, bowel sounds present in all 4 quadrants and no guarding or peritoneal signs  EXTERMITY: no cyanosis, clubbing or edema  In:  [P.O.:240; I.V.:1880]  Out: -   Date 22 0000 - 22 2359   Shift 2233-3239 2099-0960 0831-3129 24 Hour Total   INTAKE   P.O.(mL/kg/hr) 120   120   I. V.(mL/kg) 9711(18.9)   7776(92.3)   Shift Total(mL/kg) 2453(80)   8001(41)   OUTPUT   Shift Total(mL/kg)       Weight (kg) 95.3 95.3 95.3 95.3       LABS  Recent Labs     22  0624 22  1118   WBC 21.9* 14.8*   HGB 12.4* 11.5*   HCT 37.8* 35.3*    222    129*   K 4.3 3.6    97   CO2 25 20   BUN 15 18   CREATININE 0.95 0.94   CALCIUM 9.4 9.2      No results for input(s): PTT, INR in the last 72 hours. Invalid input(s): PT  No results for input(s): AST, ALT, BILITOT, BILIDIR, AMYLASE, LIPASE, LDH, LACTA in the last 72 hours. RADIOLOGY  CT ABDOMEN PELVIS WO CONTRAST Additional Contrast? None    Result Date: 2022  COMPARISON: None available. HISTORY:  abdominal pain COMMENTS:  stomach pain TECHNIQUE: procedure Thin slice spiral CT was performed from the lung bases through the iliac crests without contrast administration. Contrast enhancement was not employed due to clinician's order. Sagittal and coronal reconstructions were also performed. FINDINGS: Images through the lung bases demonstrate no infiltrates or effusions. Pericecal stranding is seen. A 5 mm stone is seen in the appendix and the appendix is dilated up to approximately 16 mm. A tiny focus of air is seen adjacent to the appendix (series 2, image 76. Free fluid is seen posteriorly in the pelvis.  A 2 cm stone is seen in the gallbladder. Non-contrast images of the liver, pancreas, spleen, adrenals are unremarkable. A 2 mm nonobstructing stone is seen in the left kidney. No hydro-nephrosis, renal calculi or marc-nephric fluid seen in the right kidney. .  No aneurysm  is visualized. Calcifications are seen in the prostate gland. Also a small right inguinal hernia is seen that contains fat. There is a Retana catheter seen in the bladder. There is also some air which is likely due to instrumentation. The lumbar spine is unremarkable. In the mid thoracic spine degenerative changes are seen at multiple levels. No destructive bony lesions are evident. 1. Acute appendicitis with a perforated appendix is noted. There is also an appendicolith. 2. No evidence for bowel obstruction. No hydronephrosis is seen. Nonobstructing nephrolith is seen on the left. 3. Cholelithiasis  All CT scans at this facility use dose modulation, iterative reconstruction, and/or weight based dosing when appropriate to reduce radiation dose to as low as reasonably achievable. Limited 2D Echo w Doppler w Color Flow    Result Date: 1/12/2022  Transthoracic Echocardiography Report (TTE)  Demographics   Patient Name     Nathalia Hall  Gender                Male   Patient Number   [id number]    Race                                                  Ethnicity   Visit Number     [id number]   Room Number           H402   Corporate ID                 Date of Study         01/12/2022   Accession Number [id number]  Referring Physician   Joellen Jacobsen MD   Date of Birth    1975  Sonographer           Camden Bernal   Age              40 year(s)  Interpreting          [facility]) Cardiology                               Physician             Anastasiya Mays  Procedure Type of Study   TTE procedure:2D-ECHO LIMITED STUDY. Procedure Date Date: 01/12/2022 Start: 12:52 PM Study Location: Portable Technical Quality: Adequate visualization Indications:Heart murmur.  Patient Status: Routine Height: 70 inches Weight: 210 pounds BSA: 2.13 m^2 BMI: 30.13 kg/m^2 BP: 120/72 mmHg  Conclusions   Summary  Normal left ventricle structure and function. Left ventricular ejection fraction is visually estimated at 60%. Patient tachycardic unable to determine accurate valvular disease, grossly  normal valve function   Signature   ----------------------------------------------------------------  Electronically signed by Brennon Andres(Interpreting physician)  on 01/12/2022 07:55 PM  ----------------------------------------------------------------   Findings  Left Ventricle Normal left ventricle structure and function. Left ventricular ejection fraction is visually estimated at 60%. Right Ventricle Normal right ventricle structure and function. Normal right ventricle systolic pressure. Left Atrium Normal left atrium. Right Atrium Normal right atrium. Mitral Valve Normal mitral valve structure and function. Tricuspid Valve Normal tricuspid valve structure and function. Aortic Valve Normal aortic valve structure and function. Pulmonic Valve Normal pulmonic valve structure and function. Pericardial Effusion No evidence of pericardial effusion. Pleural Effusion No evidence of pleural effusion. Aorta \ Miscellaneous Miscellaneous normal findings were found. M-Mode Measurements (cm)   LVIDd: 2.61 cm                         LVIDs: 1.95 cm  IVSd: 1.87 cm                          IVSs: 2.7 cm  LVPWd: 1.79 cm                         LVPWs: 1.38 cm  Rt. Vent.  Dimension: 2.27 cm           AO Root Dimension: 3.09 cm                                         ACS: 1.68 cm                                         LVOT: 1.81 cm  Doppler Measurements:   AV Peak Gradient: 75 mmHg              MV Peak E-Wave: 1.32 m/s  AV Mean Gradient: 32.57 mmHg           MV Peak A-Wave: 1.21 m/s  TR Velocity:3.92 m/s  TR Gradient:61.51 mmHg                                         Estimated RAP:5 mmHg RVSP:66.51 mmHg  Valves  Mitral Valve   Peak E-Wave: 1.32 m/s              Peak A-Wave: 1.21 m/s  Mean Velocity: 1.16 m/s            E/A Ratio: 1.09  Mean Gradient: 6.39 mmHg           Peak Gradient: 6.99 mmHg                                     Deceleration Time: 78.4 msec  MR Velocity: 6.84 m/s   Aortic Valve   Peak Velocity: 4.33 m/s            Mean Velocity: 2.61 m/s  Peak Gradient: 75 mmHg             Mean Gradient: 32.57 mmHg  AV VTI: 57.5 cm   Cusp Separation: 1.68 cm   Tricuspid Valve   Estimated RVSP: 66.51 mmHg              Estimated RAP: 5 mmHg  TR Velocity: 3.92 m/s                   TR Gradient: 61.51 mmHg   Pulmonic Valve            Estimated PASP: 66.51 mmHg   LVOT   LVOT Diameter: 1.81 cm  Structures  Left Atrium   LA Volume/Index: 77.78 ml /37 m^2             LA Area: 25.08 cm^2   Left Ventricle   Diastolic Dimension: 9.73 cm          Systolic Dimension: 3.35 cm  Septum Diastolic: 1.87 cm             Septum Systolic: 2.7 cm  PW Diastolic: 0.10 cm                 PW Systolic: 7.86 cm                                        FS: 25.3 %  LV EDV/LV EDV Index: 24.82 ml/12 m^2  LV ESV/LV ESV Index: 11.87 ml/6 m^2  EF Calculated: 52.2 %   LVOT Diameter: 1.81 cm   Right Atrium   RA Systolic Pressure: 5 mmHg   Right Ventricle   Diastolic Dimension: 0.38 cm                                    RV Systolic Pressure: 78.71 mmHg  Aorta/ Miscellaneous Aorta   Aortic Root: 3.09 cm  LVOT Diameter: 1.81 cm      Electronically signed by Beth Parker MD on 1/14/2022 at 7:43 AM

## 2022-01-14 NOTE — PROGRESS NOTES
Med with Tums for c/o heartburn. Med with Tylenol 500 mg for Temp 101.7  Patient up independently in room, encouraged ambulation in hallway. Abdomen distended with tinkling bowel sound R quads, hypoactive L quads. Pt reports need to have bowel movement, given prune juice. Bandaids x3 to abdominal lap sites are clean and dry.

## 2022-01-14 NOTE — PROGRESS NOTES
CT scan reviewed    No apparent abdominal or pelvic abscesses seen. No fluid collections identified    Patient does have reactive inflammation with resultant small bowel obstruction at the distal ileum. Most of these obstructions relieved with time. I do not anticipate surgical intervention. I have reviewed the films with the patient. I have placed him on clear liquids. His abdomen feels softer than this morning after multiple bowel movements. Clinically improving    PICC line in place for IV antibiotics at home upon discharge.

## 2022-01-14 NOTE — PROGRESS NOTES
Infectious Diseases Inpatient Progress Note          HISTORY OF PRESENT ILLNESS:  Follow up acute perforated appendicitis with abscess on IV Zosyn, well tolerated. Patient had fever spike last night. Has increased abdominal distention. Mild shortness of breath. CT of abdomen with partial small bowel obstruction  current Medications:     sodium chloride flush  5-40 mL IntraVENous 2 times per day    bisacodyl  10 mg Rectal Nightly    pantoprazole  40 mg Oral BID AC    sodium chloride flush  5-40 mL IntraVENous 2 times per day    enoxaparin  40 mg SubCUTAneous Daily    naloxegol  25 mg Oral Daily    piperacillin-tazobactam  3,375 mg IntraVENous Q8H       Allergies:  Patient has no known allergies. Review of Systems  14 system review is negative other than HPI    Physical Exam  Vitals:    01/13/22 1935 01/14/22 0515 01/14/22 0739 01/14/22 0953   BP: 102/61 127/76 122/66    Pulse: 127 120 113    Resp: 20 17 18    Temp: 101.7 °F (38.7 °C) 99.7 °F (37.6 °C) 98.1 °F (36.7 °C) 98.6 °F (37 °C)   TempSrc: Oral Oral Oral    SpO2: 97% 97% 95%    Weight:       Height:         General Appearance: alert and oriented to person, place and time, well-developed and well-nourished, in no acute distress  Skin: warm and dry, no rash. Head: normocephalic and atraumatic  Eyes: anicteric sclerae  ENT:  normal mucous membranes. Lungs: normal respiratory effort, clear lungs  Heart loud systolic ejection murmur at the apex  Abdomen: Distended abdomen. No bowel sounds.   Intact dressing over the laparoscopy site  No leg edema  No erythema, no tenderness      DATA:    Lab Results   Component Value Date    WBC 14.8 (H) 01/13/2022    HGB 11.5 (L) 01/13/2022    HCT 35.3 (L) 01/13/2022    MCV 84.6 01/13/2022     01/13/2022     Lab Results   Component Value Date    CREATININE 0.94 01/13/2022    BUN 18 01/13/2022     (L) 01/13/2022    K 3.6 01/13/2022    CL 97 01/13/2022    CO2 20 01/13/2022       Hepatic Function Panel:  Lab Results   Component Value Date    ALKPHOS 92 01/11/2022    ALT 46 01/11/2022    AST 16 01/11/2022    PROT 7.8 01/11/2022    BILITOT 1.0 01/11/2022    LABALBU 4.7 01/11/2022   2D echo without definite valvular abnormalities  Intra-Op cultures with E. coli and strep viridans  IMPRESSION:    · Acute perforated appendicitis with abscess  · E. coli and strep providers infection  · Partial small bowel obstruction  · New heart murmur    Patient Active Problem List   Diagnosis    Appendicitis with perforation    Acute appendicitis with perforation, localized peritonitis, and abscess, without gangrene    Newly recognized heart murmur       PLAN:  · Blood culture   · continue IV Zosyn  · Follow-up with surgery concerning bowel obstruction  · Agree with n.p.o. for now    Discussed with patient and significant other    Asif Vazquez MD

## 2022-01-14 NOTE — PROGRESS NOTES
Med with Toradol 30 mg IV for c/o post op pain. Had small soft BM. Abdomen remains distended.   Denies n/v

## 2022-01-15 LAB
ANION GAP SERPL CALCULATED.3IONS-SCNC: 13 MEQ/L (ref 9–15)
BUN BLDV-MCNC: 12 MG/DL (ref 6–20)
CALCIUM SERPL-MCNC: 8.9 MG/DL (ref 8.5–9.9)
CHLORIDE BLD-SCNC: 97 MEQ/L (ref 95–107)
CO2: 20 MEQ/L (ref 20–31)
CREAT SERPL-MCNC: 0.72 MG/DL (ref 0.7–1.2)
GFR AFRICAN AMERICAN: >60
GFR NON-AFRICAN AMERICAN: >60
GLUCOSE BLD-MCNC: 99 MG/DL (ref 70–99)
HCT VFR BLD CALC: 34.9 % (ref 42–52)
HEMOGLOBIN: 11.7 G/DL (ref 14–18)
MCH RBC QN AUTO: 28.4 PG (ref 27–31.3)
MCHC RBC AUTO-ENTMCNC: 33.6 % (ref 33–37)
MCV RBC AUTO: 84.6 FL (ref 80–100)
PDW BLD-RTO: 13.4 % (ref 11.5–14.5)
PLATELET # BLD: 252 K/UL (ref 130–400)
POTASSIUM SERPL-SCNC: 3.5 MEQ/L (ref 3.4–4.9)
RBC # BLD: 4.13 M/UL (ref 4.7–6.1)
SODIUM BLD-SCNC: 130 MEQ/L (ref 135–144)
WBC # BLD: 14.8 K/UL (ref 4.8–10.8)

## 2022-01-15 PROCEDURE — 80048 BASIC METABOLIC PNL TOTAL CA: CPT

## 2022-01-15 PROCEDURE — 6360000002 HC RX W HCPCS: Performed by: COLON & RECTAL SURGERY

## 2022-01-15 PROCEDURE — 1210000000 HC MED SURG R&B

## 2022-01-15 PROCEDURE — 87077 CULTURE AEROBIC IDENTIFY: CPT

## 2022-01-15 PROCEDURE — 99024 POSTOP FOLLOW-UP VISIT: CPT | Performed by: COLON & RECTAL SURGERY

## 2022-01-15 PROCEDURE — 2580000003 HC RX 258: Performed by: COLON & RECTAL SURGERY

## 2022-01-15 PROCEDURE — 6370000000 HC RX 637 (ALT 250 FOR IP): Performed by: COLON & RECTAL SURGERY

## 2022-01-15 PROCEDURE — 36415 COLL VENOUS BLD VENIPUNCTURE: CPT

## 2022-01-15 PROCEDURE — 85027 COMPLETE CBC AUTOMATED: CPT

## 2022-01-15 RX ORDER — KETOROLAC TROMETHAMINE 30 MG/ML
30 INJECTION, SOLUTION INTRAMUSCULAR; INTRAVENOUS EVERY 6 HOURS PRN
Status: DISCONTINUED | OUTPATIENT
Start: 2022-01-15 | End: 2022-01-16 | Stop reason: RX

## 2022-01-15 RX ADMIN — PANTOPRAZOLE SODIUM 40 MG: 40 TABLET, DELAYED RELEASE ORAL at 16:04

## 2022-01-15 RX ADMIN — PIPERACILLIN SODIUM AND TAZOBACTAM SODIUM 3375 MG: 3; .375 INJECTION, POWDER, LYOPHILIZED, FOR SOLUTION INTRAVENOUS at 14:05

## 2022-01-15 RX ADMIN — SODIUM CHLORIDE: 9 INJECTION, SOLUTION INTRAVENOUS at 14:05

## 2022-01-15 RX ADMIN — PIPERACILLIN SODIUM AND TAZOBACTAM SODIUM 3375 MG: 3; .375 INJECTION, POWDER, LYOPHILIZED, FOR SOLUTION INTRAVENOUS at 21:41

## 2022-01-15 RX ADMIN — PANTOPRAZOLE SODIUM 40 MG: 40 TABLET, DELAYED RELEASE ORAL at 05:44

## 2022-01-15 RX ADMIN — NALOXEGOL OXALATE 25 MG: 12.5 TABLET, FILM COATED ORAL at 08:57

## 2022-01-15 RX ADMIN — ACETAMINOPHEN 500 MG: 500 TABLET ORAL at 18:39

## 2022-01-15 RX ADMIN — ENOXAPARIN SODIUM 40 MG: 100 INJECTION SUBCUTANEOUS at 08:57

## 2022-01-15 RX ADMIN — ACETAMINOPHEN 500 MG: 500 TABLET ORAL at 09:05

## 2022-01-15 RX ADMIN — Medication 10 ML: at 21:47

## 2022-01-15 RX ADMIN — PIPERACILLIN SODIUM AND TAZOBACTAM SODIUM 3375 MG: 3; .375 INJECTION, POWDER, LYOPHILIZED, FOR SOLUTION INTRAVENOUS at 05:45

## 2022-01-15 ASSESSMENT — PAIN SCALES - GENERAL
PAINLEVEL_OUTOF10: 3
PAINLEVEL_OUTOF10: 3

## 2022-01-15 NOTE — FLOWSHEET NOTE
Pt reports feeling less distended in his abdomen, passing gas and had a small BM. Pt took PRN Tylenol for pain, declined narcotics. Pt reports a BM overnight.

## 2022-01-15 NOTE — CARE COORDINATION
Patient has midline. Will need final atb rec per ID. Patient has no insurance so we will need to have a antibiotic that can be given 1 or 2 times per day to do outpatient infusion. Patient is still on IVF, and a clear liquid diet. Patient is not ready for d/c today. CM to follow as patient is going to need IV ATB at d/c, but has not insurance.

## 2022-01-15 NOTE — PROGRESS NOTES
Pt Name: Marilia Mckinley Record Number: [id number]  Date of Birth 1975   Admit date 2022  9:35 AM  Today's Date: 1/15/2022     ASSESSMENT  1. Hospital day # 4  2 postop day #4 laparoscopic appendectomy for perforated appendicitis  3. Resulting partial small bowel obstruction near the distal ileum which I believe is secondary to inflammation and should subside nonoperatively  4. No nausea or vomiting. Bowel function starting to resume    PLAN  1. Ambulate  2. Continue Zosyn    SUBJECTIVE  Chief complaint: Follow-up appendectomy  Afebrile, vital signs are stable. He denies any nausea or vomiting, flatus with occasional bowel movement He is tolerating a ADULT DIET; Clear Liquid. His pain is well controlled on current medications. He has been ambulating in the halls. has no past medical history on file. CURRENT MEDS  Scheduled Meds:   sodium chloride flush  5-40 mL IntraVENous 2 times per day    bisacodyl  10 mg Rectal Nightly    pantoprazole  40 mg Oral BID AC    sodium chloride flush  5-40 mL IntraVENous 2 times per day    enoxaparin  40 mg SubCUTAneous Daily    naloxegol  25 mg Oral Daily    piperacillin-tazobactam  3,375 mg IntraVENous Q8H     Continuous Infusions:   sodium chloride      sodium chloride      sodium chloride 75 mL/hr at 22 2148     PRN Meds:.ketorolac, sodium chloride flush, sodium chloride, calcium carbonate, sodium chloride flush, sodium chloride, ondansetron **OR** ondansetron, oxyCODONE-acetaminophen **OR** oxyCODONE-acetaminophen, HYDROmorphone **OR** HYDROmorphone, acetaminophen    OBJECTIVE  CURRENT VITALS:  height is 6' (1.829 m) and weight is 210 lb (95.3 kg). His oral temperature is 98.8 °F (37.1 °C). His blood pressure is 126/82 and his pulse is 105. His respiration is 18 and oxygen saturation is 97%.    Temperature Range (24h):Temp: 98.8 °F (37.1 °C) Temp  Av.2 °F (37.3 °C)  Min: 98.8 °F (37.1 °C)  Max: 99.5 °F (37.5 °C)  BP Range (24h): Systolic (23YQZ), RHC:095 , Min:126 , WDF:423     Diastolic (79UXG), STACIA:26, Min:80, Max:82    Pulse Range (24h): Pulse  Av  Min: 105  Max: 107  Respiration Range (24h): Resp  Av  Min: 18  Max: 18    GENERAL: alert, no distress  LUNGS: clear to ausculation, without wheezes, rales or rhonci  HEART: normal rate and regular rhythm  ABDOMEN: soft, non-tender, distended abdomen about the same from yesterday no significant tenderness in all 4 quadrants, bowel sounds present in all 4 quadrants and no guarding or peritoneal signs  EXTERMITY: no cyanosis, clubbing or edema  In: 2287 [P.O.:350; I.V.:1937]  Out: -   Date 01/15/22 0000 - 01/15/22 2359   Shift 2758-9057 6824-3677 8466-0916 24 Hour Total   INTAKE   P.O.(mL/kg/hr) 350(0.5)   350   I. V.(mL/kg) [id number])   [id number])   Shift Total(mL/kg) 1285(94)   7621(14)   OUTPUT   Shift Total(mL/kg)       Weight (kg) 95.3 95.3 95.3 95.3       LABS  Recent Labs     22  1118 01/15/22  0540   WBC 14.8* 14.8*   HGB 11.5* 11.7*   HCT 35.3* 34.9*    252   * 130*   K 3.6 3.5   CL 97 97   CO2 20 20   BUN 18 12   CREATININE 0.94 0.72   CALCIUM 9.2 8.9      No results for input(s): PTT, INR in the last 72 hours. Invalid input(s): PT  No results for input(s): AST, ALT, BILITOT, BILIDIR, AMYLASE, LIPASE, LDH, LACTA in the last 72 hours. RADIOLOGY  CT ABDOMEN PELVIS WO CONTRAST Additional Contrast? None    Result Date: 2022  COMPARISON: None available. HISTORY:  abdominal pain COMMENTS:  stomach pain TECHNIQUE: procedure Thin slice spiral CT was performed from the lung bases through the iliac crests without contrast administration. Contrast enhancement was not employed due to clinician's order. Sagittal and coronal reconstructions were also performed. FINDINGS: Images through the lung bases demonstrate no infiltrates or effusions. Pericecal stranding is seen. A 5 mm stone is seen in the appendix and the appendix is dilated up to approximately 16 mm. A tiny focus of air is seen adjacent to the appendix (series 2, image 76. Free fluid is seen posteriorly in the pelvis. A 2 cm stone is seen in the gallbladder. Non-contrast images of the liver, pancreas, spleen, adrenals are unremarkable. A 2 mm nonobstructing stone is seen in the left kidney. No hydro-nephrosis, renal calculi or marc-nephric fluid seen in the right kidney. .  No aneurysm  is visualized. Calcifications are seen in the prostate gland. Also a small right inguinal hernia is seen that contains fat. There is a Retana catheter seen in the bladder. There is also some air which is likely due to instrumentation. The lumbar spine is unremarkable. In the mid thoracic spine degenerative changes are seen at multiple levels. No destructive bony lesions are evident. 1. Acute appendicitis with a perforated appendix is noted. There is also an appendicolith. 2. No evidence for bowel obstruction. No hydronephrosis is seen. Nonobstructing nephrolith is seen on the left. 3. Cholelithiasis  All CT scans at this facility use dose modulation, iterative reconstruction, and/or weight based dosing when appropriate to reduce radiation dose to as low as reasonably achievable. CT ABDOMEN PELVIS W IV CONTRAST Additional Contrast? Oral    Result Date: 1/14/2022  CT of the Abdomen and Pelvis with intravenous contrast medium History:  Abdominal distention. Laparoscopic appendectomy January 11, 2022, 4 acute perforated appendicitis. Technical Factors: CT imaging of the abdomen and pelvis were obtained and formatted as 5 mm contiguous axial images from the domes of the diaphragm to the symphysis pubis. Sagittal and coronal reconstructions were also obtained. Oral contrast medium:  Barium sulfate, 450 mL. Intravenous contrast medium:  Isovue-300, 100 mL. Comparison:  CT abdomen pelvis, January 11, 2022. Zachary Black Findings: Lungs:  Lung bases are clear. Liver:  Normal in size, shape, and decreased in attenuation.  Bile Ducts:  Normal in caliber. Gallbladder:  Calculus in gallbladder. No para cholecystic fluid. No gallbladder wall thickening. Pancreas:  Normal without masses, cysts, ductal dilatation or calcification. Spleen:  Normal in size without masses or calcifications. No splenules. Kidneys:  Normal in size and enhancement. No hydronephrosis, or masses. 2 mm calculus, left mid to lower pole, unchanged. Adrenals:  Normal. Small bowel:  Dilated with transition occurring in distal ileum right lower quadrant with adjacent fat stranding (series 2, image 70, series 601, image 35). Appendix:  Surgically absent. Colon:  Normal in caliber, with descending colon decompressed. Colonic fat stranding identified, and unchanged. Peritoneum:  No ascites, free air, or fluid collections. Vessels: Aorta normal in course and caliber. Portal vein, splenic vein, superior mesenteric vein are patent. Lymph nodes:  Retroperitoneal:  No enlarged retroperitoneal lymph nodes. Mesenteric:  No enlarged mesenteric lymph nodes. Pelvic: No enlarged pelvic lymph nodes. Ureters: Normal in course and caliber. No calcifications. Bladder: No wall thickening. Reproductive organs: No pelvic masses. Abdominal Wall:  No hernia identified. No diastasis of rectus musculature. No edema or masses. Bones:  No bone lesions. No degenerative changes. No post operative changes. Interval development of partial small bowel obstruction at level of distal ileum in patient with recent for perforated appendicitis. Pericolonic inflammatory change, stable, secondary to appendicitis as discussed. Stable nonobstructing left renal calculus. Hepatic steatosis. Cholelithiasis. All CT scans at this facility use dose modulation, iterative reconstruction, and/or weight based dosing when appropriate to reduce radiation dose to as low as reasonably achievable.      Limited 2D Echo w Doppler w Color Flow    Result Date: 1/12/2022  Transthoracic Echocardiography Report (TTE)  Demographics   Patient Name     April Phillips  Gender                Male   Patient Number   [id number]    Race                                                  Ethnicity   Visit Number     [id number]   Room Number           E448   Corporate ID                 Date of Study         01/12/2022   Accession Number [id number]  Referring Physician   Gloria Marrero MD   Date of Birth    1975  Sonographer           Elayne Hyde   Age              40 year(s)  Interpreting          [facility]) Cardiology                               Physician             Jony Russell  Procedure Type of Study   TTE procedure:2D-ECHO LIMITED STUDY. Procedure Date Date: 01/12/2022 Start: 12:52 PM Study Location: Portable Technical Quality: Adequate visualization Indications:Heart murmur. Patient Status: Routine Height: 70 inches Weight: 210 pounds BSA: 2.13 m^2 BMI: 30.13 kg/m^2 BP: 120/72 mmHg  Conclusions   Summary  Normal left ventricle structure and function. Left ventricular ejection fraction is visually estimated at 60%. Patient tachycardic unable to determine accurate valvular disease, grossly  normal valve function   Signature   ----------------------------------------------------------------  Electronically signed by Thomas Andres(Interpreting physician)  on 01/12/2022 07:55 PM  ----------------------------------------------------------------   Findings  Left Ventricle Normal left ventricle structure and function. Left ventricular ejection fraction is visually estimated at 60%. Right Ventricle Normal right ventricle structure and function. Normal right ventricle systolic pressure. Left Atrium Normal left atrium. Right Atrium Normal right atrium. Mitral Valve Normal mitral valve structure and function. Tricuspid Valve Normal tricuspid valve structure and function. Aortic Valve Normal aortic valve structure and function. Pulmonic Valve Normal pulmonic valve structure and function. Pericardial Effusion No evidence of pericardial effusion.  Pleural Effusion No evidence of pleural effusion. Aorta \ Miscellaneous Miscellaneous normal findings were found. M-Mode Measurements (cm)   LVIDd: 2.61 cm                         LVIDs: 1.95 cm  IVSd: 1.87 cm                          IVSs: 2.7 cm  LVPWd: 1.79 cm                         LVPWs: 1.38 cm  Rt. Vent.  Dimension: 2.27 cm           AO Root Dimension: 3.09 cm                                         ACS: 1.68 cm                                         LVOT: 1.81 cm  Doppler Measurements:   AV Peak Gradient: 75 mmHg              MV Peak E-Wave: 1.32 m/s  AV Mean Gradient: 32.57 mmHg           MV Peak A-Wave: 1.21 m/s  TR Velocity:3.92 m/s  TR Gradient:61.51 mmHg                                         Estimated RAP:5 mmHg                                         RVSP:66.51 mmHg  Valves  Mitral Valve   Peak E-Wave: 1.32 m/s              Peak A-Wave: 1.21 m/s  Mean Velocity: 1.16 m/s            E/A Ratio: 1.09  Mean Gradient: 6.39 mmHg           Peak Gradient: 6.99 mmHg                                     Deceleration Time: 78.4 msec  MR Velocity: 6.84 m/s   Aortic Valve   Peak Velocity: 4.33 m/s            Mean Velocity: 2.61 m/s  Peak Gradient: 75 mmHg             Mean Gradient: 32.57 mmHg  AV VTI: 57.5 cm   Cusp Separation: 1.68 cm   Tricuspid Valve   Estimated RVSP: 66.51 mmHg              Estimated RAP: 5 mmHg  TR Velocity: 3.92 m/s                   TR Gradient: 61.51 mmHg   Pulmonic Valve            Estimated PASP: 66.51 mmHg   LVOT   LVOT Diameter: 1.81 cm  Structures  Left Atrium   LA Volume/Index: 77.78 ml /37 m^2             LA Area: 25.08 cm^2   Left Ventricle   Diastolic Dimension: 8.79 cm          Systolic Dimension: 3.06 cm  Septum Diastolic: 9.28 cm             Septum Systolic: 2.7 cm  PW Diastolic: 3.03 cm                 PW Systolic: 8.14 cm                                        FS: 25.3 %  LV EDV/LV EDV Index: 24.82 ml/12 m^2  LV ESV/LV ESV Index: 11.87 ml/6 m^2  EF Calculated: 52.2 %   LVOT Diameter: 1.81 cm   Right Atrium   RA Systolic Pressure: 5 mmHg   Right Ventricle   Diastolic Dimension: 0.82 cm                                    RV Systolic Pressure: 41.27 mmHg  Aorta/ Miscellaneous Aorta   Aortic Root: 3.09 cm  LVOT Diameter: 1.81 cm      IR MIDLINE CATH    Result Date: 1/14/2022  NO IMAGES NO DICTATION    Electronically signed by Melody Kayser, MD on 1/15/2022 at 9:53 AM

## 2022-01-15 NOTE — PROGRESS NOTES
Assessment completed and charted on by this RN. A&Ox4. VSS. Patient requesting PRN Tylenol for pain. Administered per MAR. ABD is distended and pt states last BM was this this morning around 0730. Pt states he is passing gas. Pt voices understanding of the importance of walking in the hallways.

## 2022-01-16 PROCEDURE — 1210000000 HC MED SURG R&B

## 2022-01-16 PROCEDURE — 6360000002 HC RX W HCPCS: Performed by: COLON & RECTAL SURGERY

## 2022-01-16 PROCEDURE — 99024 POSTOP FOLLOW-UP VISIT: CPT | Performed by: COLON & RECTAL SURGERY

## 2022-01-16 PROCEDURE — 6360000002 HC RX W HCPCS: Performed by: INTERNAL MEDICINE

## 2022-01-16 PROCEDURE — 6370000000 HC RX 637 (ALT 250 FOR IP): Performed by: COLON & RECTAL SURGERY

## 2022-01-16 PROCEDURE — 2580000003 HC RX 258: Performed by: INTERNAL MEDICINE

## 2022-01-16 PROCEDURE — 2580000003 HC RX 258: Performed by: COLON & RECTAL SURGERY

## 2022-01-16 PROCEDURE — 99232 SBSQ HOSP IP/OBS MODERATE 35: CPT | Performed by: INTERNAL MEDICINE

## 2022-01-16 RX ORDER — KETOROLAC TROMETHAMINE 15 MG/ML
30 INJECTION, SOLUTION INTRAMUSCULAR; INTRAVENOUS EVERY 6 HOURS PRN
Status: DISCONTINUED | OUTPATIENT
Start: 2022-01-16 | End: 2022-01-17 | Stop reason: HOSPADM

## 2022-01-16 RX ADMIN — KETOROLAC TROMETHAMINE 30 MG: 15 INJECTION, SOLUTION INTRAMUSCULAR; INTRAVENOUS at 02:48

## 2022-01-16 RX ADMIN — PANTOPRAZOLE SODIUM 40 MG: 40 TABLET, DELAYED RELEASE ORAL at 05:25

## 2022-01-16 RX ADMIN — Medication 10 ML: at 21:39

## 2022-01-16 RX ADMIN — ENOXAPARIN SODIUM 40 MG: 100 INJECTION SUBCUTANEOUS at 09:36

## 2022-01-16 RX ADMIN — NALOXEGOL OXALATE 25 MG: 12.5 TABLET, FILM COATED ORAL at 09:36

## 2022-01-16 RX ADMIN — PANTOPRAZOLE SODIUM 40 MG: 40 TABLET, DELAYED RELEASE ORAL at 15:07

## 2022-01-16 RX ADMIN — Medication 10 ML: at 09:36

## 2022-01-16 RX ADMIN — ACETAMINOPHEN 500 MG: 500 TABLET ORAL at 22:31

## 2022-01-16 RX ADMIN — ACETAMINOPHEN 500 MG: 500 TABLET ORAL at 15:07

## 2022-01-16 RX ADMIN — ERTAPENEM 1000 MG: 1 INJECTION INTRAMUSCULAR; INTRAVENOUS at 12:31

## 2022-01-16 RX ADMIN — Medication 10 ML: at 21:40

## 2022-01-16 RX ADMIN — PIPERACILLIN SODIUM AND TAZOBACTAM SODIUM 3375 MG: 3; .375 INJECTION, POWDER, LYOPHILIZED, FOR SOLUTION INTRAVENOUS at 05:25

## 2022-01-16 ASSESSMENT — PAIN SCALES - GENERAL
PAINLEVEL_OUTOF10: 3
PAINLEVEL_OUTOF10: 3
PAINLEVEL_OUTOF10: 2
PAINLEVEL_OUTOF10: 6

## 2022-01-16 NOTE — CARE COORDINATION
Patient to come in daily for iv invantz via the outpatient infusion center here at [facility]) in [facility]. Patient is agreeable. Patient already has a Midline and is requesting an infusion time of around 12:30pm. Orders have been faxed to scheduling, outpatient infusion and the hub for weekend scheduling. Patient is planning to d/c home tomorrow after his IV antibiotic dose. Will need to verify with outpatient infusion of time of infusion for Tuesday prior to the patient being discharged home. Once patient is discharged will need to schedule infusion times with outpatient scheduling. CM to follow. Patient denies any other home going needs at this time.

## 2022-01-16 NOTE — PROGRESS NOTES
Shift assessment complete. A&OX4. Pt denies pain at this time. Medications administered per MAR. Call light within reach. No further needs at this time.

## 2022-01-16 NOTE — PROGRESS NOTES
Pt Name: Staci Alma Record Number: [id number]  Date of Birth 1975   Admit date 2022  9:35 AM  Today's Date: 2022     ASSESSMENT  1. Hospital day # 5  2 postop day #5 laparoscopic appendectomy for perforated appendicitis  3. Abdomen much improved  4. Bowel function improved    PLAN  1. Hep-Lock IV  2. Full liquids  3. Discharge tomorrow if continued improvement    SUBJECTIVE  Chief complaint: Follow-up appendectomy  Afebrile, vital signs are stable. He denies any nausea or vomiting, passing flatus and moving bowels. He is tolerating a ADULT DIET; Clear Liquid. His pain is well controlled on current medications. He has been ambulating in the halls. has no past medical history on file. CURRENT MEDS  Scheduled Meds:   sodium chloride flush  5-40 mL IntraVENous 2 times per day    bisacodyl  10 mg Rectal Nightly    pantoprazole  40 mg Oral BID AC    sodium chloride flush  5-40 mL IntraVENous 2 times per day    enoxaparin  40 mg SubCUTAneous Daily    naloxegol  25 mg Oral Daily    piperacillin-tazobactam  3,375 mg IntraVENous Q8H     Continuous Infusions:   sodium chloride      sodium chloride      sodium chloride 75 mL/hr at 01/15/22 1405     PRN Meds:.ketorolac, sodium chloride flush, sodium chloride, calcium carbonate, sodium chloride flush, sodium chloride, ondansetron **OR** ondansetron, oxyCODONE-acetaminophen **OR** oxyCODONE-acetaminophen, HYDROmorphone **OR** HYDROmorphone, acetaminophen    OBJECTIVE  CURRENT VITALS:  height is 6' (1.829 m) and weight is 210 lb (95.3 kg). His oral temperature is 97.5 °F (36.4 °C). His blood pressure is 134/76 and his pulse is 87. His respiration is 18 and oxygen saturation is 99%.    Temperature Range (24h):Temp: 97.5 °F (36.4 °C) Temp  Av.9 °F (36.6 °C)  Min: 97.5 °F (36.4 °C)  Max: 98.2 °F (36.8 °C)  BP Range (61Y): Systolic (62CCY), BHD:534 , Min:125 , LHK:117     Diastolic (38BXD), HUH:46, Min:75, Max:76    Pulse Range (24h): Pulse  Av  Min: 87  Max: 93  Respiration Range (24h): Resp  Av  Min: 18  Max: 18    GENERAL: alert, no distress  LUNGS: clear to ausculation, without wheezes, rales or rhonci  HEART: normal rate and regular rhythm  ABDOMEN: soft, non-tender, much softer and less distended, bowel sounds present in all 4 quadrants and no guarding or peritoneal signs  EXTERMITY: no cyanosis, clubbing or edema  In: 2418 [P.O.:600; I.V.:1818]  Out: -   Date 22 0000 - 22 2359   Shift 1699-2732 4092-6318 9223-2449 24 Hour Total   INTAKE   I.V.(mL/kg) 1818(19.1)   1818(19.1)   Shift Total(mL/kg) 3870(68.8)   1818(19.1)   OUTPUT   Shift Total(mL/kg)       Weight (kg) 95.3 95.3 95.3 95.3       LABS  Recent Labs     22  1118 01/15/22  0540   WBC 14.8* 14.8*   HGB 11.5* 11.7*   HCT 35.3* 34.9*    252   * 130*   K 3.6 3.5   CL 97 97   CO2 20 20   BUN 18 12   CREATININE 0.94 0.72   CALCIUM 9.2 8.9      No results for input(s): PTT, INR in the last 72 hours. Invalid input(s): PT  No results for input(s): AST, ALT, BILITOT, BILIDIR, AMYLASE, LIPASE, LDH, LACTA in the last 72 hours. RADIOLOGY  CT ABDOMEN PELVIS WO CONTRAST Additional Contrast? None    Result Date: 2022  COMPARISON: None available. HISTORY:  abdominal pain COMMENTS:  stomach pain TECHNIQUE: procedure Thin slice spiral CT was performed from the lung bases through the iliac crests without contrast administration. Contrast enhancement was not employed due to clinician's order. Sagittal and coronal reconstructions were also performed. FINDINGS: Images through the lung bases demonstrate no infiltrates or effusions. Pericecal stranding is seen. A 5 mm stone is seen in the appendix and the appendix is dilated up to approximately 16 mm. A tiny focus of air is seen adjacent to the appendix (series 2, image 76. Free fluid is seen posteriorly in the pelvis. A 2 cm stone is seen in the gallbladder.  Non-contrast images of the liver, pancreas, spleen, adrenals are unremarkable. A 2 mm nonobstructing stone is seen in the left kidney. No hydro-nephrosis, renal calculi or marc-nephric fluid seen in the right kidney. .  No aneurysm  is visualized. Calcifications are seen in the prostate gland. Also a small right inguinal hernia is seen that contains fat. There is a Retana catheter seen in the bladder. There is also some air which is likely due to instrumentation. The lumbar spine is unremarkable. In the mid thoracic spine degenerative changes are seen at multiple levels. No destructive bony lesions are evident. 1. Acute appendicitis with a perforated appendix is noted. There is also an appendicolith. 2. No evidence for bowel obstruction. No hydronephrosis is seen. Nonobstructing nephrolith is seen on the left. 3. Cholelithiasis  All CT scans at this facility use dose modulation, iterative reconstruction, and/or weight based dosing when appropriate to reduce radiation dose to as low as reasonably achievable. CT ABDOMEN PELVIS W IV CONTRAST Additional Contrast? Oral    Result Date: 1/14/2022  CT of the Abdomen and Pelvis with intravenous contrast medium History:  Abdominal distention. Laparoscopic appendectomy January 11, 2022, 4 acute perforated appendicitis. Technical Factors: CT imaging of the abdomen and pelvis were obtained and formatted as 5 mm contiguous axial images from the domes of the diaphragm to the symphysis pubis. Sagittal and coronal reconstructions were also obtained. Oral contrast medium:  Barium sulfate, 450 mL. Intravenous contrast medium:  Isovue-300, 100 mL. Comparison:  CT abdomen pelvis, January 11, 2022. George Settler Findings: Lungs:  Lung bases are clear. Liver:  Normal in size, shape, and decreased in attenuation. Bile Ducts:  Normal in caliber. Gallbladder:  Calculus in gallbladder. No para cholecystic fluid. No gallbladder wall thickening. Pancreas:  Normal without masses, cysts, ductal dilatation or calcification.  Spleen:  Normal in size without masses or calcifications. No splenules. Kidneys:  Normal in size and enhancement. No hydronephrosis, or masses. 2 mm calculus, left mid to lower pole, unchanged. Adrenals:  Normal. Small bowel:  Dilated with transition occurring in distal ileum right lower quadrant with adjacent fat stranding (series 2, image 70, series 601, image 35). Appendix:  Surgically absent. Colon:  Normal in caliber, with descending colon decompressed. Colonic fat stranding identified, and unchanged. Peritoneum:  No ascites, free air, or fluid collections. Vessels: Aorta normal in course and caliber. Portal vein, splenic vein, superior mesenteric vein are patent. Lymph nodes:  Retroperitoneal:  No enlarged retroperitoneal lymph nodes. Mesenteric:  No enlarged mesenteric lymph nodes. Pelvic: No enlarged pelvic lymph nodes. Ureters: Normal in course and caliber. No calcifications. Bladder: No wall thickening. Reproductive organs: No pelvic masses. Abdominal Wall:  No hernia identified. No diastasis of rectus musculature. No edema or masses. Bones:  No bone lesions. No degenerative changes. No post operative changes. Interval development of partial small bowel obstruction at level of distal ileum in patient with recent for perforated appendicitis. Pericolonic inflammatory change, stable, secondary to appendicitis as discussed. Stable nonobstructing left renal calculus. Hepatic steatosis. Cholelithiasis. All CT scans at this facility use dose modulation, iterative reconstruction, and/or weight based dosing when appropriate to reduce radiation dose to as low as reasonably achievable.      Limited 2D Echo w Doppler w Color Flow    Result Date: 1/12/2022  Transthoracic Echocardiography Report (TTE)  Demographics   Patient Name     Mackenzie Taveras  Gender                Male   Patient Number   [id number]    Race                                                  Ethnicity   Visit Number     [id number]   Room Number P117   Corporate ID                 Date of Study         01/12/2022   Accession Number [id number]  Referring Physician   Jeramy Bain MD   Date of Birth    1975  Sonographer           Davis Hay   Age              40 year(s)  Interpreting          [facility]) Cardiology                               Physician             Bishop Skiff  Procedure Type of Study   TTE procedure:2D-ECHO LIMITED STUDY. Procedure Date Date: 01/12/2022 Start: 12:52 PM Study Location: Portable Technical Quality: Adequate visualization Indications:Heart murmur. Patient Status: Routine Height: 70 inches Weight: 210 pounds BSA: 2.13 m^2 BMI: 30.13 kg/m^2 BP: 120/72 mmHg  Conclusions   Summary  Normal left ventricle structure and function. Left ventricular ejection fraction is visually estimated at 60%. Patient tachycardic unable to determine accurate valvular disease, grossly  normal valve function   Signature   ----------------------------------------------------------------  Electronically signed by Alex Andres(Interpreting physician)  on 01/12/2022 07:55 PM  ----------------------------------------------------------------   Findings  Left Ventricle Normal left ventricle structure and function. Left ventricular ejection fraction is visually estimated at 60%. Right Ventricle Normal right ventricle structure and function. Normal right ventricle systolic pressure. Left Atrium Normal left atrium. Right Atrium Normal right atrium. Mitral Valve Normal mitral valve structure and function. Tricuspid Valve Normal tricuspid valve structure and function. Aortic Valve Normal aortic valve structure and function. Pulmonic Valve Normal pulmonic valve structure and function. Pericardial Effusion No evidence of pericardial effusion. Pleural Effusion No evidence of pleural effusion. Aorta \ Miscellaneous Miscellaneous normal findings were found.  M-Mode Measurements (cm)   LVIDd: 2.61 cm                         LVIDs: 1.95 cm  IVSd: 1.87 cm IVSs: 2.7 cm  LVPWd: 1.79 cm                         LVPWs: 1.38 cm  Rt. Vent.  Dimension: 2.27 cm           AO Root Dimension: 3.09 cm                                         ACS: 1.68 cm                                         LVOT: 1.81 cm  Doppler Measurements:   AV Peak Gradient: 75 mmHg              MV Peak E-Wave: 1.32 m/s  AV Mean Gradient: 32.57 mmHg           MV Peak A-Wave: 1.21 m/s  TR Velocity:3.92 m/s  TR Gradient:61.51 mmHg                                         Estimated RAP:5 mmHg                                         RVSP:66.51 mmHg  Valves  Mitral Valve   Peak E-Wave: 1.32 m/s              Peak A-Wave: 1.21 m/s  Mean Velocity: 1.16 m/s            E/A Ratio: 1.09  Mean Gradient: 6.39 mmHg           Peak Gradient: 6.99 mmHg                                     Deceleration Time: 78.4 msec  MR Velocity: 6.84 m/s   Aortic Valve   Peak Velocity: 4.33 m/s            Mean Velocity: 2.61 m/s  Peak Gradient: 75 mmHg             Mean Gradient: 32.57 mmHg  AV VTI: 57.5 cm   Cusp Separation: 1.68 cm   Tricuspid Valve   Estimated RVSP: 66.51 mmHg              Estimated RAP: 5 mmHg  TR Velocity: 3.92 m/s                   TR Gradient: 61.51 mmHg   Pulmonic Valve            Estimated PASP: 66.51 mmHg   LVOT   LVOT Diameter: 1.81 cm  Structures  Left Atrium   LA Volume/Index: 77.78 ml /37 m^2             LA Area: 25.08 cm^2   Left Ventricle   Diastolic Dimension: 0.55 cm          Systolic Dimension: 4.65 cm  Septum Diastolic: 0.07 cm             Septum Systolic: 2.7 cm  PW Diastolic: 4.53 cm                 PW Systolic: 3.12 cm                                        FS: 25.3 %  LV EDV/LV EDV Index: 24.82 ml/12 m^2  LV ESV/LV ESV Index: 11.87 ml/6 m^2  EF Calculated: 52.2 %   LVOT Diameter: 1.81 cm   Right Atrium   RA Systolic Pressure: 5 mmHg   Right Ventricle   Diastolic Dimension: 5.50 cm                                    RV Systolic Pressure: 21.42 mmHg  Aorta/ Miscellaneous Aorta Aortic Root: 3.09 cm  LVOT Diameter: 1.81 cm      IR MIDLINE CATH    Result Date: 1/14/2022  NO IMAGES NO DICTATION    Electronically signed by Roxana Walter MD on 1/16/2022 at 10:17 AM

## 2022-01-16 NOTE — PROGRESS NOTES
Infectious Diseases Inpatient Progress Note          HISTORY OF PRESENT ILLNESS:  Follow up acute perforated appendicitis with abscess on IV Zosyn, well tolerated. Patient is improving with decreased abdominal distention and discomfort, resolved fevers   Tolerating diet   Positive bowel movements   Persistent leukocytosis   CT of abdomen with partial small bowel obstruction  current Medications:     sodium chloride flush  5-40 mL IntraVENous 2 times per day    bisacodyl  10 mg Rectal Nightly    pantoprazole  40 mg Oral BID AC    sodium chloride flush  5-40 mL IntraVENous 2 times per day    enoxaparin  40 mg SubCUTAneous Daily    naloxegol  25 mg Oral Daily    piperacillin-tazobactam  3,375 mg IntraVENous Q8H       Allergies:  Patient has no known allergies. Review of Systems  14 system review is negative other than HPI    Physical Exam  Vitals:    01/14/22 2020 01/15/22 0900 01/15/22 1953 01/16/22 0805   BP: (!) 143/80 126/82 125/75 134/76   Pulse: 107 105 93 87   Resp:  18 18 18   Temp: 99.5 °F (37.5 °C) 98.8 °F (37.1 °C) 98.2 °F (36.8 °C) 97.5 °F (36.4 °C)   TempSrc:  Oral Oral Oral   SpO2: 96% 97% 97% 99%   Weight:       Height:         General Appearance: alert and oriented to person, place and time, well-developed and well-nourished, in no acute distress  Skin: warm and dry, no rash. Head: normocephalic and atraumatic  Eyes: anicteric sclerae  ENT:  normal mucous membranes. Lungs: normal respiratory effort, clear lungs  Heart systolic ejection murmur grade 2/6 at the apex  Abdomen: Less distended abdomen. Positive bowel sounds.   Intact dressing over the laparoscopy site  No leg edema  No erythema, no tenderness      DATA:    Lab Results   Component Value Date    WBC 14.8 (H) 01/15/2022    HGB 11.7 (L) 01/15/2022    HCT 34.9 (L) 01/15/2022    MCV 84.6 01/15/2022     01/15/2022     Lab Results   Component Value Date    CREATININE 0.72 01/15/2022    BUN 12 01/15/2022     (L) 01/15/2022    K 3.5 01/15/2022    CL 97 01/15/2022    CO2 20 01/15/2022       Hepatic Function Panel:  Lab Results   Component Value Date    ALKPHOS 92 01/11/2022    ALT 46 01/11/2022    AST 16 01/11/2022    PROT 7.8 01/11/2022    BILITOT 1.0 01/11/2022    LABALBU 4.7 01/11/2022   2D echo without definite valvular abnormalities  Intra-Op cultures with E. coli and strep viridans  Anaerobic Culture No Anaerobes Isolated    Organism Escherichia coli Abnormal  P    Culture Surgical Light growth P    Organism Gram negative veda Abnormal  P    Culture Surgical Light growth   ID and sensitivity to follow   Organism sent to reference laboratory for additional testing  P      Organism Streptococcus viridans group Abnormal  P    Culture Surgical Light growth   No further workup  P      Resulting Agency [address] [facility]          Susceptibility     Escherichia coli     BACTERIAL SUSCEPTIBILITY PANEL BY EMILIE     ampicillin <=2 mcg/mL Sensitive     ampicillin-sulbactam <=2 mcg/mL Sensitive     ceFAZolin <=4 mcg/mL Sensitive     ciprofloxacin <=0.25 mcg/mL Sensitive     gentamicin <=1 mcg/mL Sensitive     trimethoprim-sulfamethoxazole <=20 mcg/mL Sensitive                 IMPRESSION:    · Acute perforated appendicitis with abscess  · E. coli and strep providers infection  · Partial small bowel obstruction, resolved  · New heart murmur    Patient Active Problem List   Diagnosis    Appendicitis with perforation    Acute appendicitis with perforation, localized peritonitis, and abscess, without gangrene    Newly recognized heart murmur       PLAN:  · Change Zosyn to Invanz   · Arrange for discharge tomorrow on 3 weeks IV Invanz   · Follow-up CBC BMP weekly   · CRP in 2 weeks   · Follow-up in my office in 2 weeks   ·   Discussed with Dr. Radha Munson patient and significant other    Memo Nelson MD

## 2022-01-16 NOTE — PROGRESS NOTES
Assessment completed and charted on by this RN. VSS. A&Ox4. Pt reports that pain is controlled at this time and reports that his abd feels less distended. Bowel sounds are present. Pt is tolerating clear liquids. Pt is ambulating in hallway throughout shift. Medications given per [facility]     1500: Patient requesting PRN Tylenol for pain-administered per MAR. Pt is tolerating full liquid diet.

## 2022-01-17 VITALS
WEIGHT: 210 LBS | HEIGHT: 72 IN | OXYGEN SATURATION: 97 % | TEMPERATURE: 97.7 F | RESPIRATION RATE: 18 BRPM | BODY MASS INDEX: 28.44 KG/M2 | HEART RATE: 97 BPM | SYSTOLIC BLOOD PRESSURE: 128 MMHG | DIASTOLIC BLOOD PRESSURE: 82 MMHG

## 2022-01-17 PROCEDURE — 6360000002 HC RX W HCPCS: Performed by: COLON & RECTAL SURGERY

## 2022-01-17 PROCEDURE — 6360000002 HC RX W HCPCS: Performed by: INTERNAL MEDICINE

## 2022-01-17 PROCEDURE — 2580000003 HC RX 258: Performed by: INTERNAL MEDICINE

## 2022-01-17 PROCEDURE — 6370000000 HC RX 637 (ALT 250 FOR IP): Performed by: COLON & RECTAL SURGERY

## 2022-01-17 PROCEDURE — 99232 SBSQ HOSP IP/OBS MODERATE 35: CPT | Performed by: INTERNAL MEDICINE

## 2022-01-17 PROCEDURE — 99024 POSTOP FOLLOW-UP VISIT: CPT | Performed by: COLON & RECTAL SURGERY

## 2022-01-17 RX ORDER — OXYCODONE HYDROCHLORIDE AND ACETAMINOPHEN 5; 325 MG/1; MG/1
1 TABLET ORAL EVERY 6 HOURS PRN
Qty: 10 TABLET | Refills: 0 | Status: SHIPPED | OUTPATIENT
Start: 2022-01-17 | End: 2022-01-18 | Stop reason: ALTCHOICE

## 2022-01-17 RX ADMIN — PANTOPRAZOLE SODIUM 40 MG: 40 TABLET, DELAYED RELEASE ORAL at 07:55

## 2022-01-17 RX ADMIN — ERTAPENEM 1000 MG: 1 INJECTION INTRAMUSCULAR; INTRAVENOUS at 14:08

## 2022-01-17 RX ADMIN — ACETAMINOPHEN 500 MG: 500 TABLET ORAL at 07:55

## 2022-01-17 RX ADMIN — ENOXAPARIN SODIUM 40 MG: 100 INJECTION SUBCUTANEOUS at 07:48

## 2022-01-17 RX ADMIN — NALOXEGOL OXALATE 25 MG: 12.5 TABLET, FILM COATED ORAL at 07:44

## 2022-01-17 ASSESSMENT — ENCOUNTER SYMPTOMS
ABDOMINAL PAIN: 0
NAUSEA: 0
CONSTIPATION: 0
RESPIRATORY NEGATIVE: 1
DIARRHEA: 0

## 2022-01-17 ASSESSMENT — PAIN SCALES - GENERAL: PAINLEVEL_OUTOF10: 3

## 2022-01-17 NOTE — PROGRESS NOTES
Assumed care of pt this shift. Pt is a/ox4 able to make needs known. Pts vital signs are stable and WNL. Spouse at bedside. Pts abdomen is slightly distended, 3 bandages in place. No drainage noted. Pt is tolerating diet. Will get today's dose of Invanz  At 1230 today. Plans for outpt infusions and discharge today    Pt updated on place of care.

## 2022-01-17 NOTE — CARE COORDINATION
ANTICIPATE DC TO HOME TODAY IF TOLERATING DIET. OUTPATIENT INFUSION CENTER ABLE TO ARRANGE INFUSION FOR 1PM STARTING ON 1/18. CALL PLACED TO  TO NOTIFY OF PLAN WITH NO ANSWER.

## 2022-01-17 NOTE — DISCHARGE SUMMARY
Physician Discharge Summary     Patient ID:  Víctor Loza  [id number]  55 y.o.  1975    Admit date: 1/11/2022    Discharge date and time: 1/17/2022    Admitting Physician: Mundo Rubalcava MD     Discharge Physician: Same    Admission Diagnoses: Perforated appendicitis [K35.32]    Discharge Diagnoses: Same    Admission Condition: poor    Discharged Condition: good    Indication for Admission: Acute perforated appendicitis with abscess    Hospital Course: Patient was admitted from [facility] with acute appendicitis with suspected perforation. This admission was on 1/11/2022. The details of this admission can be found in the admitting history and physical.    He was taken to the operating room that day for a laparoscopic appendectomy and pelvic washout, the details of which can be found in the operative report. He was taken to the floor postoperatively. His abdomen over the course of the next 48 hours became quite distended consistent with postoperative ileus. His white blood cell count initially went high and then started returning back toward baseline. Infectious disease was consulted for assistance with adjuvant antibiotics. The details of their consult can be found in the chart. A CT scan on postoperative day #4 showed a resultant partial small bowel obstruction from inflammation in the right lower quadrant. Patient was managed conservatively and abdominal distention started improving as the inflammation subsided. On postop day #5 and 6 he was advanced to a full liquid diet which had like him to be on for the next few days. On postoperative day #6 he was doing well and was eager to go home. Discharge instructions were given at the bedside regarding activity, medication, follow-up and wound care. He had a PICC line placed for outpatient infusion of Invanz at the discretion of the infectious disease physician. All questions were answered.   Patient discharged to home.    Consults: ID    Significant Diagnostic Studies: labs: CBC, BMP    Treatments: antibiotics: Zosyn and Invanz, procedures: PICC line and surgery: Laparoscopic appendectomy with abscess washout    Discharge Exam:  See exam dated 1/17/2022    Disposition: home    In process/preliminary results:  Outstanding Order Results     Date and Time Order Name Status Description    1/14/2022  2:05 PM Culture, Blood 1 Preliminary     1/11/2022 12:58 PM MISC [facility] 1 Preliminary     1/11/2022 12:58 PM Culture, Surgical Preliminary           Patient Instructions:   Current Discharge Medication List      START taking these medications    Details   oxyCODONE-acetaminophen (PERCOCET) 5-325 MG per tablet Take 1 tablet by mouth every 6 hours as needed for Pain for up to 3 days. Qty: 10 tablet, Refills: 0    Comments: Reduce doses taken as pain becomes manageable  Associated Diagnoses: Appendicitis with perforation      ertapenem (INVANZ) infusion Infuse 1,000 mg intravenously every 24 hours for 21 days Compound per protocol. CBC BMP weekly  CRP in 2 weeks  Fax results to [id number]  Qty: 21 g, Refills: 0         CONTINUE these medications which have NOT CHANGED    Details   omeprazole (PRILOSEC) 10 MG delayed release capsule Take 10 mg by mouth as needed Pt states he only takes it maybe once a week unsure of dose           Activity: activity as tolerated  Diet: Full liquid diet for the next 72 hours  Wound Care: keep wound clean and dry    Follow-up with Howie Pink in 4 days.     SignedTino Baldwin  1/17/2022  11:21 AM

## 2022-01-17 NOTE — PROGRESS NOTES
Infectious Disease     Patient Name: Chacorta Mazariegos  Date: 1/17/2022  YOB: 1975  Medical Record Number: [id number]          Peritonitis following perforation of the appendix    No fevers chills some abdominal discomfort  Good oral intake         01/11/2022     PREOPERATIVE DIAGNOSIS:  Acute appendicitis.     POSTOPERATIVE DIAGNOSIS:  Perforated necrotic appendicitis.     PROCEDURE PERFORMED:  Laparoscopic appendectomy with abdominal washout    Susceptibility              Escherichia coli       BACTERIAL SUSCEPTIBILITY PANEL BY EMILIE       ampicillin <=2 mcg/mL Sensitive       ampicillin-sulbactam <=2 mcg/mL Sensitive       ceFAZolin <=4 mcg/mL Sensitive       ciprofloxacin <=0.25 mcg/mL Sensitive       gentamicin <=1 mcg/mL Sensitive       trimethoprim-sulfamethoxazole <=20 mcg/mL Sensitive                     Review of Systems   Respiratory: Negative. Cardiovascular: Negative. Gastrointestinal: Negative for abdominal pain, constipation, diarrhea and nausea. Review of Systems: All 14 review of systems negative other than as stated above    Social History     Tobacco Use    Smoking status: Current Every Day Smoker     Packs/day: 0.50     Types: Cigarettes    Smokeless tobacco: Never Used   Substance Use Topics    Alcohol use: Yes    Drug use: Never         History reviewed. No pertinent past medical history. Past Surgical History:   Procedure Laterality Date    IR MIDLINE CATH  1/13/2022    IR MIDLINE CATH 1/13/2022 [facility] SPECIAL PROCEDURE    LAPAROSCOPIC APPENDECTOMY N/A 1/11/2022    LAPAROSCOPIC APPENDECTOMY, PATIENT COMING FROM [place] performed by Dixon Blum MD at [facility]         No current facility-administered medications on file prior to encounter.      Current Outpatient Medications on File Prior to Encounter   Medication Sig Dispense Refill    omeprazole (PRILOSEC) 10 MG delayed release capsule Take 10 mg by mouth as needed Pt states he only takes it maybe once a week unsure of dose         No Known Allergies      No family history on file. Physical Exam:      Physical Exam  Constitutional:       Appearance: He is not ill-appearing. Cardiovascular:      Heart sounds: Normal heart sounds. No murmur heard. Pulmonary:      Effort: Pulmonary effort is normal. No respiratory distress. Breath sounds: No wheezing, rhonchi or rales. Abdominal:      General: There is no distension. Palpations: Abdomen is soft. There is no mass. Tenderness: There is abdominal tenderness. There is no guarding. Blood pressure 128/82, pulse 97, temperature 97.7 °F (36.5 °C), resp. rate 18, height 6' (1.829 m), weight 210 lb (95.3 kg), SpO2 97 %.       .   Lab Results   Component Value Date    WBC 14.8 (H) 01/15/2022    HGB 11.7 (L) 01/15/2022    HCT 34.9 (L) 01/15/2022    MCV 84.6 01/15/2022     01/15/2022     Lab Results   Component Value Date     01/15/2022    K 3.5 01/15/2022    CL 97 01/15/2022    CO2 20 01/15/2022    BUN 12 01/15/2022    CREATININE 0.72 01/15/2022    GLUCOSE 99 01/15/2022    CALCIUM 8.9 01/15/2022                ASSESSMENT:  Patient Active Problem List   Diagnosis    Appendicitis with perforation    Acute appendicitis with perforation, localized peritonitis, and abscess, without gangrene    Newly recognized heart murmur         PLAN:    Peritonitis  Appendicitis appendix perforation    Invanz 3 weeks

## 2022-01-17 NOTE — PROGRESS NOTES
Pt Name: Marc Okeefe Record Number: [id number]  Date of Birth 1975   Admit date 2022  9:35 AM  Today's Date: 2022     ASSESSMENT  1. Hospital day # 6  2 postop day #6 laparoscopic appendectomy with resultant small bowel obstruction, resolved    PLAN  1. Discharge home  2. Invanz once a day outpatient antibiotic at infusion center set up by infectious disease  3. Office Friday for staple removal    SUBJECTIVE  Chief complaint: Follow-up appendectomy  Afebrile, vital signs are stable. He denies any nausea or vomiting, bowels working. He is tolerating a ADULT DIET; Full Liquid. His pain is well controlled on current medications. He has been ambulating in the halls. has no past medical history on file. CURRENT MEDS  Scheduled Meds:   ertapenem (INVanz) IVPB  1,000 mg IntraVENous Q24H    sodium chloride flush  5-40 mL IntraVENous 2 times per day    pantoprazole  40 mg Oral BID AC    sodium chloride flush  5-40 mL IntraVENous 2 times per day    enoxaparin  40 mg SubCUTAneous Daily    naloxegol  25 mg Oral Daily     Continuous Infusions:   sodium chloride      sodium chloride       PRN Meds:.ketorolac, sodium chloride flush, sodium chloride, calcium carbonate, sodium chloride flush, sodium chloride, ondansetron **OR** ondansetron, oxyCODONE-acetaminophen **OR** oxyCODONE-acetaminophen, HYDROmorphone **OR** HYDROmorphone, acetaminophen    OBJECTIVE  CURRENT VITALS:  height is 6' (1.829 m) and weight is 210 lb (95.3 kg). His temperature is 97.7 °F (36.5 °C). His blood pressure is 128/82 and his pulse is 97. His respiration is 18 and oxygen saturation is 97%.    Temperature Range (24h):Temp: 97.7 °F (36.5 °C) Temp  Av.9 °F (36.6 °C)  Min: 97.7 °F (36.5 °C)  Max: 98.1 °F (36.7 °C)  BP Range (82F): Systolic (91DHA), CBQ:548 , Min:128 , PANFILO:886     Diastolic (28OLQ), TYU:89, Min:79, Max:82    Pulse Range (24h): Pulse  Av  Min: 93  Max: 97  Respiration Range (24h): No data recorded    GENERAL: alert, no distress  LUNGS: clear to ausculation, without wheezes, rales or rhonci  HEART: normal rate and regular rhythm  ABDOMEN: soft, non-tender, minimal distention, nontender, bowel sounds present in all 4 quadrants and no guarding or peritoneal signs  EXTERMITY: no cyanosis, clubbing or edema  No intake/output data recorded. LABS  Recent Labs     01/15/22  0540   WBC 14.8*   HGB 11.7*   HCT 34.9*      *   K 3.5   CL 97   CO2 20   BUN 12   CREATININE 0.72   CALCIUM 8.9      No results for input(s): PTT, INR in the last 72 hours. Invalid input(s): PT  No results for input(s): AST, ALT, BILITOT, BILIDIR, AMYLASE, LIPASE, LDH, LACTA in the last 72 hours. RADIOLOGY  CT ABDOMEN PELVIS WO CONTRAST Additional Contrast? None    Result Date: 1/11/2022  COMPARISON: None available. HISTORY:  abdominal pain COMMENTS:  stomach pain TECHNIQUE: procedure Thin slice spiral CT was performed from the lung bases through the iliac crests without contrast administration. Contrast enhancement was not employed due to clinician's order. Sagittal and coronal reconstructions were also performed. FINDINGS: Images through the lung bases demonstrate no infiltrates or effusions. Pericecal stranding is seen. A 5 mm stone is seen in the appendix and the appendix is dilated up to approximately 16 mm. A tiny focus of air is seen adjacent to the appendix (series 2, image 76. Free fluid is seen posteriorly in the pelvis. A 2 cm stone is seen in the gallbladder. Non-contrast images of the liver, pancreas, spleen, adrenals are unremarkable. A 2 mm nonobstructing stone is seen in the left kidney. No hydro-nephrosis, renal calculi or marc-nephric fluid seen in the right kidney. .  No aneurysm  is visualized. Calcifications are seen in the prostate gland. Also a small right inguinal hernia is seen that contains fat. There is a Retana catheter seen in the bladder.  There is also some air which is likely due to instrumentation. The lumbar spine is unremarkable. In the mid thoracic spine degenerative changes are seen at multiple levels. No destructive bony lesions are evident. 1. Acute appendicitis with a perforated appendix is noted. There is also an appendicolith. 2. No evidence for bowel obstruction. No hydronephrosis is seen. Nonobstructing nephrolith is seen on the left. 3. Cholelithiasis  All CT scans at this facility use dose modulation, iterative reconstruction, and/or weight based dosing when appropriate to reduce radiation dose to as low as reasonably achievable. CT ABDOMEN PELVIS W IV CONTRAST Additional Contrast? Oral    Result Date: 1/14/2022  CT of the Abdomen and Pelvis with intravenous contrast medium History:  Abdominal distention. Laparoscopic appendectomy January 11, 2022, 4 acute perforated appendicitis. Technical Factors: CT imaging of the abdomen and pelvis were obtained and formatted as 5 mm contiguous axial images from the domes of the diaphragm to the symphysis pubis. Sagittal and coronal reconstructions were also obtained. Oral contrast medium:  Barium sulfate, 450 mL. Intravenous contrast medium:  Isovue-300, 100 mL. Comparison:  CT abdomen pelvis, January 11, 2022. Maycol Rich Findings: Lungs:  Lung bases are clear. Liver:  Normal in size, shape, and decreased in attenuation. Bile Ducts:  Normal in caliber. Gallbladder:  Calculus in gallbladder. No para cholecystic fluid. No gallbladder wall thickening. Pancreas:  Normal without masses, cysts, ductal dilatation or calcification. Spleen:  Normal in size without masses or calcifications. No splenules. Kidneys:  Normal in size and enhancement. No hydronephrosis, or masses. 2 mm calculus, left mid to lower pole, unchanged. Adrenals:  Normal. Small bowel:  Dilated with transition occurring in distal ileum right lower quadrant with adjacent fat stranding (series 2, image 70, series 601, image 35). Appendix:  Surgically absent.  Colon:  Normal in caliber, with descending colon decompressed. Colonic fat stranding identified, and unchanged. Peritoneum:  No ascites, free air, or fluid collections. Vessels: Aorta normal in course and caliber. Portal vein, splenic vein, superior mesenteric vein are patent. Lymph nodes:  Retroperitoneal:  No enlarged retroperitoneal lymph nodes. Mesenteric:  No enlarged mesenteric lymph nodes. Pelvic: No enlarged pelvic lymph nodes. Ureters: Normal in course and caliber. No calcifications. Bladder: No wall thickening. Reproductive organs: No pelvic masses. Abdominal Wall:  No hernia identified. No diastasis of rectus musculature. No edema or masses. Bones:  No bone lesions. No degenerative changes. No post operative changes. Interval development of partial small bowel obstruction at level of distal ileum in patient with recent for perforated appendicitis. Pericolonic inflammatory change, stable, secondary to appendicitis as discussed. Stable nonobstructing left renal calculus. Hepatic steatosis. Cholelithiasis. All CT scans at this facility use dose modulation, iterative reconstruction, and/or weight based dosing when appropriate to reduce radiation dose to as low as reasonably achievable.      Limited 2D Echo w Doppler w Color Flow    Result Date: 1/12/2022  Transthoracic Echocardiography Report (TTE)  Demographics   Patient Name     Carlton Velez  Gender                Male   Patient Number   [id number]    Race                                                  Ethnicity   Visit Number     [id number]   Room Number           M754   Corporate ID                 Date of Study         01/12/2022   Accession Number [id number]  Referring Physician   Keven Delarosa MD   Date of Birth    1975  Sonographer           [employer]   Age              40 year(s)  Interpreting          [facility]) Cardiology                               Physician             [facility]  Procedure Type of Study   TTE procedure:2D-ECHO LIMITED STUDY. Procedure Date Date: 01/12/2022 Start: 12:52 PM Study Location: Portable Technical Quality: Adequate visualization Indications:Heart murmur. Patient Status: Routine Height: 70 inches Weight: 210 pounds BSA: 2.13 m^2 BMI: 30.13 kg/m^2 BP: 120/72 mmHg  Conclusions   Summary  Normal left ventricle structure and function. Left ventricular ejection fraction is visually estimated at 60%. Patient tachycardic unable to determine accurate valvular disease, grossly  normal valve function   Signature   ----------------------------------------------------------------  Electronically signed by Sami Andres(Interpreting physician)  on 01/12/2022 07:55 PM  ----------------------------------------------------------------   Findings  Left Ventricle Normal left ventricle structure and function. Left ventricular ejection fraction is visually estimated at 60%. Right Ventricle Normal right ventricle structure and function. Normal right ventricle systolic pressure. Left Atrium Normal left atrium. Right Atrium Normal right atrium. Mitral Valve Normal mitral valve structure and function. Tricuspid Valve Normal tricuspid valve structure and function. Aortic Valve Normal aortic valve structure and function. Pulmonic Valve Normal pulmonic valve structure and function. Pericardial Effusion No evidence of pericardial effusion. Pleural Effusion No evidence of pleural effusion. Aorta \ Miscellaneous Miscellaneous normal findings were found. M-Mode Measurements (cm)   LVIDd: 2.61 cm                         LVIDs: 1.95 cm  IVSd: 1.87 cm                          IVSs: 2.7 cm  LVPWd: 1.79 cm                         LVPWs: 1.38 cm  Rt. Vent.  Dimension: 2.27 cm           AO Root Dimension: 3.09 cm                                         ACS: 1.68 cm                                         LVOT: 1.81 cm  Doppler Measurements:   AV Peak Gradient: 75 mmHg              MV Peak E-Wave: 1.32 m/s  AV Mean Gradient: 32.57 mmHg           MV Peak A-Wave: 1.21 m/s  TR Velocity:3.92 m/s  TR Gradient:61.51 mmHg                                         Estimated RAP:5 mmHg                                         RVSP:66.51 mmHg  Valves  Mitral Valve   Peak E-Wave: 1.32 m/s              Peak A-Wave: 1.21 m/s  Mean Velocity: 1.16 m/s            E/A Ratio: 1.09  Mean Gradient: 6.39 mmHg           Peak Gradient: 6.99 mmHg                                     Deceleration Time: 78.4 msec  MR Velocity: 6.84 m/s   Aortic Valve   Peak Velocity: 4.33 m/s            Mean Velocity: 2.61 m/s  Peak Gradient: 75 mmHg             Mean Gradient: 32.57 mmHg  AV VTI: 57.5 cm   Cusp Separation: 1.68 cm   Tricuspid Valve   Estimated RVSP: 66.51 mmHg              Estimated RAP: 5 mmHg  TR Velocity: 3.92 m/s                   TR Gradient: 61.51 mmHg   Pulmonic Valve            Estimated PASP: 66.51 mmHg   LVOT   LVOT Diameter: 1.81 cm  Structures  Left Atrium   LA Volume/Index: 77.78 ml /37 m^2             LA Area: 25.08 cm^2   Left Ventricle   Diastolic Dimension: 4.05 cm          Systolic Dimension: 9.45 cm  Septum Diastolic: 6.53 cm             Septum Systolic: 2.7 cm  PW Diastolic: 1.30 cm                 PW Systolic: 6.74 cm                                        FS: 25.3 %  LV EDV/LV EDV Index: 24.82 ml/12 m^2  LV ESV/LV ESV Index: 11.87 ml/6 m^2  EF Calculated: 52.2 %   LVOT Diameter: 1.81 cm   Right Atrium   RA Systolic Pressure: 5 mmHg   Right Ventricle   Diastolic Dimension: 0.38 cm                                    RV Systolic Pressure: 10.07 mmHg  Aorta/ Miscellaneous Aorta   Aortic Root: 3.09 cm  LVOT Diameter: 1.81 cm      IR MIDLINE CATH    Result Date: 1/14/2022  NO IMAGES NO DICTATION    Electronically signed by Gladys Gil MD on 1/17/2022 at 11:17 AM

## 2022-01-18 ENCOUNTER — HOSPITAL ENCOUNTER (OUTPATIENT)
Dept: INFUSION THERAPY | Age: 47
Setting detail: INFUSION SERIES
Discharge: HOME | End: 2022-01-18

## 2022-01-18 VITALS
HEART RATE: 98 BPM | SYSTOLIC BLOOD PRESSURE: 141 MMHG | RESPIRATION RATE: 18 BRPM | DIASTOLIC BLOOD PRESSURE: 79 MMHG | TEMPERATURE: 97.9 F

## 2022-01-18 DIAGNOSIS — K35.32 APPENDICITIS WITH PERFORATION: ICD-10-CM

## 2022-01-18 DIAGNOSIS — K35.33 ACUTE APPENDICITIS WITH PERFORATION, LOCALIZED PERITONITIS, AND ABSCESS, WITHOUT GANGRENE: Primary | ICD-10-CM

## 2022-01-18 PROCEDURE — 6360000002 HC RX W HCPCS: Performed by: INTERNAL MEDICINE

## 2022-01-18 PROCEDURE — 2580000003 HC RX 258: Performed by: INTERNAL MEDICINE

## 2022-01-18 PROCEDURE — 96365 THER/PROPH/DIAG IV INF INIT: CPT

## 2022-01-18 RX ADMIN — ERTAPENEM 1000 MG: 1 INJECTION INTRAMUSCULAR; INTRAVENOUS at 11:31

## 2022-01-18 NOTE — FLOWSHEET NOTE
Patient to the floor ambulatory for his infusion. Vital signs taken. Denies any discomfort. Call light within reach. Education given both verbal and handout. Verbalized understanding.

## 2022-01-18 NOTE — FLOWSHEET NOTE
Infusion is complete. Tolerated well. AVS printed and given to patient. Left the unit ambulatory. All equipment used in the care for this patient has been cleaned.

## 2022-01-19 ENCOUNTER — HOSPITAL ENCOUNTER (OUTPATIENT)
Dept: INFUSION THERAPY | Age: 47
Setting detail: INFUSION SERIES
Discharge: HOME | End: 2022-01-19

## 2022-01-19 VITALS
SYSTOLIC BLOOD PRESSURE: 111 MMHG | HEART RATE: 79 BPM | DIASTOLIC BLOOD PRESSURE: 72 MMHG | RESPIRATION RATE: 18 BRPM | TEMPERATURE: 98.3 F

## 2022-01-19 DIAGNOSIS — K35.32 APPENDICITIS WITH PERFORATION: ICD-10-CM

## 2022-01-19 DIAGNOSIS — K35.33 ACUTE APPENDICITIS WITH PERFORATION, LOCALIZED PERITONITIS, AND ABSCESS, WITHOUT GANGRENE: Primary | ICD-10-CM

## 2022-01-19 LAB
ANAEROBIC CULTURE: ABNORMAL
BLOOD CULTURE, ROUTINE: NORMAL
CULTURE SURGICAL: ABNORMAL
GRAM STAIN RESULT: ABNORMAL
MISCELLANEOUS LAB TEST ORDER: NORMAL
ORGANISM: ABNORMAL
WHOPPER PROMPT: NORMAL

## 2022-01-19 PROCEDURE — 6360000002 HC RX W HCPCS: Performed by: INTERNAL MEDICINE

## 2022-01-19 PROCEDURE — 96365 THER/PROPH/DIAG IV INF INIT: CPT

## 2022-01-19 PROCEDURE — 2580000003 HC RX 258: Performed by: INTERNAL MEDICINE

## 2022-01-19 RX ADMIN — ERTAPENEM 1000 MG: 1 INJECTION INTRAMUSCULAR; INTRAVENOUS at 12:10

## 2022-01-19 ASSESSMENT — PAIN SCALES - GENERAL: PAINLEVEL_OUTOF10: 1

## 2022-01-19 NOTE — PROGRESS NOTES
Pt to [facility] ambulatory for atbx. Pt states c/o slight pain/irritation of incision area. Call light in reach.

## 2022-01-20 ENCOUNTER — HOSPITAL ENCOUNTER (OUTPATIENT)
Dept: INFUSION THERAPY | Age: 47
Setting detail: INFUSION SERIES
Discharge: HOME | End: 2022-01-20

## 2022-01-20 VITALS
SYSTOLIC BLOOD PRESSURE: 115 MMHG | DIASTOLIC BLOOD PRESSURE: 68 MMHG | RESPIRATION RATE: 18 BRPM | HEART RATE: 95 BPM | TEMPERATURE: 98.3 F

## 2022-01-20 DIAGNOSIS — K35.33 ACUTE APPENDICITIS WITH PERFORATION, LOCALIZED PERITONITIS, AND ABSCESS, WITHOUT GANGRENE: Primary | ICD-10-CM

## 2022-01-20 DIAGNOSIS — K35.32 APPENDICITIS WITH PERFORATION: ICD-10-CM

## 2022-01-20 PROCEDURE — 6360000002 HC RX W HCPCS: Performed by: INTERNAL MEDICINE

## 2022-01-20 PROCEDURE — 96365 THER/PROPH/DIAG IV INF INIT: CPT

## 2022-01-20 PROCEDURE — 2580000003 HC RX 258: Performed by: INTERNAL MEDICINE

## 2022-01-20 RX ADMIN — ERTAPENEM 1000 MG: 1 INJECTION INTRAMUSCULAR; INTRAVENOUS at 12:29

## 2022-01-21 ENCOUNTER — HOSPITAL ENCOUNTER (OUTPATIENT)
Dept: INFUSION THERAPY | Age: 47
Setting detail: INFUSION SERIES
Discharge: HOME | End: 2022-01-21

## 2022-01-21 ENCOUNTER — OFFICE VISIT (OUTPATIENT)
Dept: SURGERY | Age: 47
End: 2022-01-21

## 2022-01-21 VITALS
DIASTOLIC BLOOD PRESSURE: 72 MMHG | HEART RATE: 83 BPM | TEMPERATURE: 98.2 F | SYSTOLIC BLOOD PRESSURE: 115 MMHG | RESPIRATION RATE: 18 BRPM

## 2022-01-21 VITALS
HEIGHT: 72 IN | HEART RATE: 92 BPM | OXYGEN SATURATION: 98 % | TEMPERATURE: 97.1 F | WEIGHT: 209 LBS | BODY MASS INDEX: 28.31 KG/M2

## 2022-01-21 DIAGNOSIS — K35.33 ACUTE APPENDICITIS WITH PERFORATION, LOCALIZED PERITONITIS, AND ABSCESS, WITHOUT GANGRENE: Primary | ICD-10-CM

## 2022-01-21 DIAGNOSIS — K35.32 APPENDICITIS WITH PERFORATION: ICD-10-CM

## 2022-01-21 LAB
ANION GAP SERPL CALCULATED.3IONS-SCNC: 10 MEQ/L (ref 9–15)
BUN BLDV-MCNC: 14 MG/DL (ref 6–20)
C-REACTIVE PROTEIN, HIGH SENSITIVITY: 11.7 MG/L (ref 0–5)
CALCIUM SERPL-MCNC: 10 MG/DL (ref 8.5–9.9)
CHLORIDE BLD-SCNC: 99 MEQ/L (ref 95–107)
CO2: 28 MEQ/L (ref 20–31)
CREAT SERPL-MCNC: 0.94 MG/DL (ref 0.7–1.2)
GFR AFRICAN AMERICAN: >60
GFR NON-AFRICAN AMERICAN: >60
GLUCOSE BLD-MCNC: 99 MG/DL (ref 70–99)
HCT VFR BLD CALC: 40 % (ref 42–52)
HEMOGLOBIN: 13.4 G/DL (ref 14–18)
MCH RBC QN AUTO: 28.2 PG (ref 27–31.3)
MCHC RBC AUTO-ENTMCNC: 33.5 % (ref 33–37)
MCV RBC AUTO: 84.1 FL (ref 80–100)
PDW BLD-RTO: 13.4 % (ref 11.5–14.5)
PLATELET # BLD: 530 K/UL (ref 130–400)
POTASSIUM SERPL-SCNC: 4.5 MEQ/L (ref 3.4–4.9)
RBC # BLD: 4.75 M/UL (ref 4.7–6.1)
SODIUM BLD-SCNC: 137 MEQ/L (ref 135–144)
WBC # BLD: 13.3 K/UL (ref 4.8–10.8)

## 2022-01-21 PROCEDURE — 2580000003 HC RX 258: Performed by: INTERNAL MEDICINE

## 2022-01-21 PROCEDURE — 6360000002 HC RX W HCPCS: Performed by: INTERNAL MEDICINE

## 2022-01-21 PROCEDURE — 86141 C-REACTIVE PROTEIN HS: CPT

## 2022-01-21 PROCEDURE — 80048 BASIC METABOLIC PNL TOTAL CA: CPT

## 2022-01-21 PROCEDURE — 99024 POSTOP FOLLOW-UP VISIT: CPT | Performed by: COLON & RECTAL SURGERY

## 2022-01-21 PROCEDURE — 36415 COLL VENOUS BLD VENIPUNCTURE: CPT

## 2022-01-21 PROCEDURE — 85027 COMPLETE CBC AUTOMATED: CPT

## 2022-01-21 PROCEDURE — 96365 THER/PROPH/DIAG IV INF INIT: CPT

## 2022-01-21 RX ADMIN — ERTAPENEM 1000 MG: 1 INJECTION INTRAMUSCULAR; INTRAVENOUS at 10:50

## 2022-01-21 ASSESSMENT — PAIN SCALES - GENERAL: PAINLEVEL_OUTOF10: 0

## 2022-01-21 NOTE — LETTER
[facility]  [address]  [place] [address]  Phone: [phone]  Fax: [phone]    Dennis Ang MD        January 21, 2022     Patient: Jose De Los Santos   YOB: 1975   Date of Visit: 1/21/2022       To Whom It May Concern: It is my medical opinion that Jose De Los Santos may return to work on 01/24/2022 with the following restrictions: none . If you have any questions or concerns, please don't hesitate to call.     Sincerely,        Dennis Ang MD

## 2022-01-21 NOTE — FLOWSHEET NOTE
Patient to the floor ambulatory for his labs and infusion. Vital signs taken. Denies any discomfort. Call light within reach.

## 2022-01-21 NOTE — PROGRESS NOTES
Subjective:      Patient ID: Willi Flores is a 40 y.o. male who presents for:  Chief Complaint   Patient presents with    Post-Op Check       He returns to the office 10 days out from a laparoscopic appendectomy for perforated appendicitis with pelvic abscess. He is currently receiving outpatient Invanz therapy daily. His abdominal distention has resolved. His bowel movements are loose but functioning. He denies fever. Tolerating diet      No past medical history on file. Past Surgical History:   Procedure Laterality Date    IR MIDLINE CATH  1/13/2022    IR MIDLINE CATH 1/13/2022 [facility] SPECIAL PROCEDURE    LAPAROSCOPIC APPENDECTOMY N/A 1/11/2022    LAPAROSCOPIC APPENDECTOMY, PATIENT COMING FROM [place] performed by Matt Hoyt MD at [address] [place] [address] History     Socioeconomic History    Marital status: Single     Spouse name: Not on file    Number of children: Not on file    Years of education: Not on file    Highest education level: Not on file   Occupational History    Not on file   Tobacco Use    Smoking status: Current Every Day Smoker     Packs/day: 0.50     Types: Cigarettes    Smokeless tobacco: Never Used   Substance and Sexual Activity    Alcohol use: Yes    Drug use: Never    Sexual activity: Not on file   Other Topics Concern    Not on file   Social History Narrative    Not on file     Social Determinants of Health     Financial Resource Strain:     Difficulty of Paying Living Expenses: Not on file   Food Insecurity:     Worried About Running Out of Food in the Last Year: Not on file    Kay of Food in the Last Year: Not on file   Transportation Needs:     Lack of Transportation (Medical): Not on file    Lack of Transportation (Non-Medical):  Not on file   Physical Activity:     Days of Exercise per Week: Not on file    Minutes of Exercise per Session: Not on file   Stress:     Feeling of Stress : Not on file   Social Connections:     Frequency of Communication with Friends and Family: Not on file    Frequency of Social Gatherings with Friends and Family: Not on file    Attends Gnosticist Services: Not on file    Active Member of Clubs or Organizations: Not on file    Attends Club or Organization Meetings: Not on file    Marital Status: Not on file   Intimate Partner Violence:     Fear of Current or Ex-Partner: Not on file    Emotionally Abused: Not on file    Physically Abused: Not on file    Sexually Abused: Not on file   Housing Stability:     Unable to Pay for Housing in the Last Year: Not on file    Number of [place] in the Last Year: Not on file    Unstable Housing in the Last Year: Not on file     No family history on file. Allergies:  Patient has no known allergies. Review of Systems    Objective:    Pulse 92   Temp 97.1 °F (36.2 °C) (Temporal)   Ht 6' (1.829 m)   Wt 209 lb (94.8 kg)   SpO2 98%   BMI 28.35 kg/m²     Physical Exam  On exam his abdomen is soft and nondistended    His staples were all removed. Steri-Strips were placed . No signs of infection. Assessment/Plan:          Diagnosis Orders   1. Acute appendicitis with perforation, localized peritonitis, and abscess, without gangrene       Wound care and activity instructions were given    He will return back to see me in the office as needed    He can return to work Monday without restrictions. Please note this report has beenpartially produced using speech recognition software and may cause contain errors related to that system including grammar, punctuation and spelling as well as words and phrases that may seem inappropriate.  If there arequestions or concerns please feel free to contact me to clarify

## 2022-01-22 ENCOUNTER — HOSPITAL ENCOUNTER (OUTPATIENT)
Dept: INFUSION THERAPY | Age: 47
Setting detail: INFUSION SERIES
Discharge: HOME | End: 2022-01-22

## 2022-01-22 VITALS
HEART RATE: 86 BPM | SYSTOLIC BLOOD PRESSURE: 115 MMHG | DIASTOLIC BLOOD PRESSURE: 75 MMHG | TEMPERATURE: 98.1 F | RESPIRATION RATE: 18 BRPM

## 2022-01-22 DIAGNOSIS — K35.32 APPENDICITIS WITH PERFORATION: ICD-10-CM

## 2022-01-22 DIAGNOSIS — K35.33 ACUTE APPENDICITIS WITH PERFORATION, LOCALIZED PERITONITIS, AND ABSCESS, WITHOUT GANGRENE: Primary | ICD-10-CM

## 2022-01-22 PROCEDURE — 6360000002 HC RX W HCPCS: Performed by: INTERNAL MEDICINE

## 2022-01-22 PROCEDURE — 2580000003 HC RX 258: Performed by: INTERNAL MEDICINE

## 2022-01-22 PROCEDURE — 96365 THER/PROPH/DIAG IV INF INIT: CPT

## 2022-01-22 RX ADMIN — ERTAPENEM 1000 MG: 1 INJECTION INTRAMUSCULAR; INTRAVENOUS at 08:50

## 2022-01-22 NOTE — FLOWSHEET NOTE
Infusion complete, patient tolerated well. Left unit by ambulation. All equipment sanitized after discharge.

## 2022-01-23 ENCOUNTER — HOSPITAL ENCOUNTER (OUTPATIENT)
Dept: INFUSION THERAPY | Age: 47
Setting detail: INFUSION SERIES
Discharge: HOME | End: 2022-01-23

## 2022-01-23 VITALS
HEART RATE: 89 BPM | DIASTOLIC BLOOD PRESSURE: 71 MMHG | SYSTOLIC BLOOD PRESSURE: 115 MMHG | TEMPERATURE: 98.2 F | RESPIRATION RATE: 18 BRPM

## 2022-01-23 DIAGNOSIS — K35.32 APPENDICITIS WITH PERFORATION: ICD-10-CM

## 2022-01-23 DIAGNOSIS — K35.33 ACUTE APPENDICITIS WITH PERFORATION, LOCALIZED PERITONITIS, AND ABSCESS, WITHOUT GANGRENE: Primary | ICD-10-CM

## 2022-01-23 PROCEDURE — 6360000002 HC RX W HCPCS: Performed by: INTERNAL MEDICINE

## 2022-01-23 PROCEDURE — 96365 THER/PROPH/DIAG IV INF INIT: CPT

## 2022-01-23 PROCEDURE — 2580000003 HC RX 258: Performed by: INTERNAL MEDICINE

## 2022-01-23 RX ADMIN — ERTAPENEM 1000 MG: 1 INJECTION INTRAMUSCULAR; INTRAVENOUS at 08:49

## 2022-01-23 ASSESSMENT — PAIN SCALES - GENERAL: PAINLEVEL_OUTOF10: 0

## 2022-01-23 NOTE — FLOWSHEET NOTE
Infusion complete. Patient tolerated well. Patient left unit ambulatory. All equipment used in the care for this patient has been cleaned.

## 2022-01-24 ENCOUNTER — HOSPITAL ENCOUNTER (OUTPATIENT)
Dept: INFUSION THERAPY | Age: 47
Setting detail: INFUSION SERIES
Discharge: HOME | End: 2022-01-24

## 2022-01-24 VITALS
RESPIRATION RATE: 18 BRPM | HEART RATE: 85 BPM | TEMPERATURE: 97.9 F | DIASTOLIC BLOOD PRESSURE: 77 MMHG | SYSTOLIC BLOOD PRESSURE: 127 MMHG

## 2022-01-24 DIAGNOSIS — K35.32 APPENDICITIS WITH PERFORATION: ICD-10-CM

## 2022-01-24 DIAGNOSIS — K35.33 ACUTE APPENDICITIS WITH PERFORATION, LOCALIZED PERITONITIS, AND ABSCESS, WITHOUT GANGRENE: Primary | ICD-10-CM

## 2022-01-24 PROCEDURE — 6360000002 HC RX W HCPCS: Performed by: INTERNAL MEDICINE

## 2022-01-24 PROCEDURE — 96365 THER/PROPH/DIAG IV INF INIT: CPT

## 2022-01-24 PROCEDURE — 2580000003 HC RX 258: Performed by: INTERNAL MEDICINE

## 2022-01-24 RX ADMIN — SODIUM CHLORIDE 1000 MG: 900 INJECTION INTRAVENOUS at 12:14

## 2022-01-24 ASSESSMENT — PAIN SCALES - GENERAL: PAINLEVEL_OUTOF10: 0

## 2022-01-24 NOTE — FLOWSHEET NOTE
Patient to the floor ambulatory for his infusion. Vital signs taken. Denies any discomfort. Call light within reach.

## 2022-01-25 ENCOUNTER — TELEPHONE (OUTPATIENT)
Dept: FAMILY MEDICINE CLINIC | Age: 47
End: 2022-01-25

## 2022-01-25 ENCOUNTER — HOSPITAL ENCOUNTER (OUTPATIENT)
Dept: INFUSION THERAPY | Age: 47
Setting detail: INFUSION SERIES
Discharge: HOME | End: 2022-01-25

## 2022-01-25 VITALS
SYSTOLIC BLOOD PRESSURE: 116 MMHG | RESPIRATION RATE: 18 BRPM | HEART RATE: 90 BPM | DIASTOLIC BLOOD PRESSURE: 82 MMHG | TEMPERATURE: 97.9 F

## 2022-01-25 DIAGNOSIS — K35.32 APPENDICITIS WITH PERFORATION: ICD-10-CM

## 2022-01-25 DIAGNOSIS — K35.33 ACUTE APPENDICITIS WITH PERFORATION, LOCALIZED PERITONITIS, AND ABSCESS, WITHOUT GANGRENE: Primary | ICD-10-CM

## 2022-01-25 PROCEDURE — 6360000002 HC RX W HCPCS: Performed by: INTERNAL MEDICINE

## 2022-01-25 PROCEDURE — 96365 THER/PROPH/DIAG IV INF INIT: CPT

## 2022-01-25 PROCEDURE — 2580000003 HC RX 258: Performed by: INTERNAL MEDICINE

## 2022-01-25 RX ADMIN — ERTAPENEM 1000 MG: 1 INJECTION INTRAMUSCULAR; INTRAVENOUS at 12:13

## 2022-01-25 NOTE — TELEPHONE ENCOUNTER
----- Message from Patrick Enriquez sent at 1/24/2022 11:35 AM EST -----  Subject: Message to Provider    QUESTIONS  Information for Provider? PT would like to ask Dr. Barbie Herron if it is   necessary to have a hospital f/u appt regarding his appendicitis surgery. PT requesting call back to clarify.  ---------------------------------------------------------------------------  --------------  CALL BACK INFO  What is the best way for the office to contact you? OK to leave message on   voicemail  Preferred Call Back Phone Number? [phone]  ---------------------------------------------------------------------------  --------------  SCRIPT ANSWERS  Relationship to Patient?  Self

## 2022-01-26 ENCOUNTER — HOSPITAL ENCOUNTER (OUTPATIENT)
Dept: INFUSION THERAPY | Age: 47
Setting detail: INFUSION SERIES
Discharge: HOME | End: 2022-01-26

## 2022-01-26 VITALS
DIASTOLIC BLOOD PRESSURE: 81 MMHG | TEMPERATURE: 97.9 F | RESPIRATION RATE: 18 BRPM | HEART RATE: 89 BPM | SYSTOLIC BLOOD PRESSURE: 146 MMHG

## 2022-01-26 DIAGNOSIS — K35.33 ACUTE APPENDICITIS WITH PERFORATION, LOCALIZED PERITONITIS, AND ABSCESS, WITHOUT GANGRENE: Primary | ICD-10-CM

## 2022-01-26 DIAGNOSIS — K35.32 APPENDICITIS WITH PERFORATION: ICD-10-CM

## 2022-01-26 PROCEDURE — 2580000003 HC RX 258: Performed by: INTERNAL MEDICINE

## 2022-01-26 PROCEDURE — 96365 THER/PROPH/DIAG IV INF INIT: CPT

## 2022-01-26 PROCEDURE — 6360000002 HC RX W HCPCS: Performed by: INTERNAL MEDICINE

## 2022-01-26 RX ADMIN — ERTAPENEM 1000 MG: 1 INJECTION INTRAMUSCULAR; INTRAVENOUS at 12:12

## 2022-01-26 NOTE — FLOWSHEET NOTE
Infusion complete. Patient tolerated well. Left unit by ambulation. All equipment used in the care for this patient has been cleaned.

## 2022-01-27 ENCOUNTER — HOSPITAL ENCOUNTER (OUTPATIENT)
Dept: INFUSION THERAPY | Age: 47
Setting detail: INFUSION SERIES
Discharge: HOME | End: 2022-01-27

## 2022-01-27 VITALS
HEART RATE: 86 BPM | DIASTOLIC BLOOD PRESSURE: 73 MMHG | RESPIRATION RATE: 18 BRPM | SYSTOLIC BLOOD PRESSURE: 110 MMHG | TEMPERATURE: 97.8 F

## 2022-01-27 DIAGNOSIS — K35.32 APPENDICITIS WITH PERFORATION: ICD-10-CM

## 2022-01-27 DIAGNOSIS — K35.33 ACUTE APPENDICITIS WITH PERFORATION, LOCALIZED PERITONITIS, AND ABSCESS, WITHOUT GANGRENE: Primary | ICD-10-CM

## 2022-01-27 PROCEDURE — 96365 THER/PROPH/DIAG IV INF INIT: CPT

## 2022-01-27 PROCEDURE — 6360000002 HC RX W HCPCS: Performed by: INTERNAL MEDICINE

## 2022-01-27 PROCEDURE — 2580000003 HC RX 258: Performed by: INTERNAL MEDICINE

## 2022-01-27 RX ADMIN — ERTAPENEM 1000 MG: 1 INJECTION INTRAMUSCULAR; INTRAVENOUS at 12:12

## 2022-01-27 NOTE — FLOWSHEET NOTE
Infusion complete. Tolerated well left the unit ambulatory. All equipment used in the care for this patient has been cleaned.

## 2022-01-28 ENCOUNTER — HOSPITAL ENCOUNTER (OUTPATIENT)
Dept: INFUSION THERAPY | Age: 47
Setting detail: INFUSION SERIES
Discharge: HOME | End: 2022-01-28

## 2022-01-28 VITALS
RESPIRATION RATE: 18 BRPM | TEMPERATURE: 98.1 F | HEART RATE: 95 BPM | SYSTOLIC BLOOD PRESSURE: 117 MMHG | DIASTOLIC BLOOD PRESSURE: 78 MMHG

## 2022-01-28 DIAGNOSIS — K35.33 ACUTE APPENDICITIS WITH PERFORATION, LOCALIZED PERITONITIS, AND ABSCESS, WITHOUT GANGRENE: Primary | ICD-10-CM

## 2022-01-28 DIAGNOSIS — K35.32 APPENDICITIS WITH PERFORATION: ICD-10-CM

## 2022-01-28 LAB
ANION GAP SERPL CALCULATED.3IONS-SCNC: 10 MEQ/L (ref 9–15)
BUN BLDV-MCNC: 19 MG/DL (ref 6–20)
CALCIUM SERPL-MCNC: 9.8 MG/DL (ref 8.5–9.9)
CHLORIDE BLD-SCNC: 100 MEQ/L (ref 95–107)
CO2: 24 MEQ/L (ref 20–31)
CREAT SERPL-MCNC: 0.9 MG/DL (ref 0.7–1.2)
GFR AFRICAN AMERICAN: >60
GFR NON-AFRICAN AMERICAN: >60
GLUCOSE BLD-MCNC: 102 MG/DL (ref 70–99)
HCT VFR BLD CALC: 40.5 % (ref 42–52)
HEMOGLOBIN: 13.6 G/DL (ref 14–18)
MCH RBC QN AUTO: 28.1 PG (ref 27–31.3)
MCHC RBC AUTO-ENTMCNC: 33.7 % (ref 33–37)
MCV RBC AUTO: 83.4 FL (ref 80–100)
PDW BLD-RTO: 12.9 % (ref 11.5–14.5)
PLATELET # BLD: 401 K/UL (ref 130–400)
POTASSIUM SERPL-SCNC: 4.2 MEQ/L (ref 3.4–4.9)
RBC # BLD: 4.85 M/UL (ref 4.7–6.1)
SODIUM BLD-SCNC: 134 MEQ/L (ref 135–144)
WBC # BLD: 7.6 K/UL (ref 4.8–10.8)

## 2022-01-28 PROCEDURE — 6360000002 HC RX W HCPCS: Performed by: INTERNAL MEDICINE

## 2022-01-28 PROCEDURE — 85027 COMPLETE CBC AUTOMATED: CPT

## 2022-01-28 PROCEDURE — 80048 BASIC METABOLIC PNL TOTAL CA: CPT

## 2022-01-28 PROCEDURE — 96365 THER/PROPH/DIAG IV INF INIT: CPT

## 2022-01-28 PROCEDURE — 36592 COLLECT BLOOD FROM PICC: CPT

## 2022-01-28 PROCEDURE — 2580000003 HC RX 258: Performed by: INTERNAL MEDICINE

## 2022-01-28 RX ADMIN — ERTAPENEM 1000 MG: 1 INJECTION INTRAMUSCULAR; INTRAVENOUS at 12:14

## 2022-01-28 NOTE — PROGRESS NOTES
Pt to [facility] ambulatory  For labs and atbx. Labs drawn and sent. pt states no c/o. Infusion initiated.

## 2022-01-29 ENCOUNTER — HOSPITAL ENCOUNTER (OUTPATIENT)
Dept: INFUSION THERAPY | Age: 47
Setting detail: INFUSION SERIES
Discharge: HOME | End: 2022-01-29

## 2022-01-29 VITALS
HEART RATE: 76 BPM | RESPIRATION RATE: 18 BRPM | DIASTOLIC BLOOD PRESSURE: 60 MMHG | SYSTOLIC BLOOD PRESSURE: 104 MMHG | TEMPERATURE: 98.2 F

## 2022-01-29 DIAGNOSIS — K35.33 ACUTE APPENDICITIS WITH PERFORATION, LOCALIZED PERITONITIS, AND ABSCESS, WITHOUT GANGRENE: Primary | ICD-10-CM

## 2022-01-29 DIAGNOSIS — K35.32 APPENDICITIS WITH PERFORATION: ICD-10-CM

## 2022-01-29 PROCEDURE — 96365 THER/PROPH/DIAG IV INF INIT: CPT

## 2022-01-29 PROCEDURE — 2580000003 HC RX 258: Performed by: INTERNAL MEDICINE

## 2022-01-29 PROCEDURE — 6360000002 HC RX W HCPCS: Performed by: INTERNAL MEDICINE

## 2022-01-29 RX ADMIN — ERTAPENEM 1000 MG: 1 INJECTION INTRAMUSCULAR; INTRAVENOUS at 08:42

## 2022-01-29 ASSESSMENT — PAIN SCALES - GENERAL: PAINLEVEL_OUTOF10: 0

## 2022-01-29 NOTE — FLOWSHEET NOTE
Patient arrived ambulatory to unit for invanz infusion. No distress noted. Infusion started. Call light within reach.

## 2022-01-30 ENCOUNTER — HOSPITAL ENCOUNTER (OUTPATIENT)
Dept: INFUSION THERAPY | Age: 47
Setting detail: INFUSION SERIES
Discharge: HOME | End: 2022-01-30

## 2022-01-30 VITALS — SYSTOLIC BLOOD PRESSURE: 106 MMHG | TEMPERATURE: 98.4 F | DIASTOLIC BLOOD PRESSURE: 64 MMHG | HEART RATE: 80 BPM

## 2022-01-30 DIAGNOSIS — K35.33 ACUTE APPENDICITIS WITH PERFORATION, LOCALIZED PERITONITIS, AND ABSCESS, WITHOUT GANGRENE: Primary | ICD-10-CM

## 2022-01-30 DIAGNOSIS — K35.32 APPENDICITIS WITH PERFORATION: ICD-10-CM

## 2022-01-30 PROCEDURE — 96365 THER/PROPH/DIAG IV INF INIT: CPT

## 2022-01-30 PROCEDURE — 2580000003 HC RX 258: Performed by: INTERNAL MEDICINE

## 2022-01-30 PROCEDURE — 6360000002 HC RX W HCPCS: Performed by: INTERNAL MEDICINE

## 2022-01-30 RX ADMIN — ERTAPENEM 1000 MG: 1 INJECTION INTRAMUSCULAR; INTRAVENOUS at 08:29

## 2022-01-30 NOTE — PROGRESS NOTES
Pt to [facility] ambulatory, with friend, for atbx. States no c/o. Call light in reach.

## 2022-01-31 ENCOUNTER — HOSPITAL ENCOUNTER (OUTPATIENT)
Dept: INFUSION THERAPY | Age: 47
Setting detail: INFUSION SERIES
Discharge: HOME | End: 2022-01-31

## 2022-01-31 VITALS
TEMPERATURE: 97.5 F | DIASTOLIC BLOOD PRESSURE: 67 MMHG | HEART RATE: 93 BPM | SYSTOLIC BLOOD PRESSURE: 126 MMHG | RESPIRATION RATE: 18 BRPM

## 2022-01-31 DIAGNOSIS — K35.32 APPENDICITIS WITH PERFORATION: ICD-10-CM

## 2022-01-31 DIAGNOSIS — K35.33 ACUTE APPENDICITIS WITH PERFORATION, LOCALIZED PERITONITIS, AND ABSCESS, WITHOUT GANGRENE: Primary | ICD-10-CM

## 2022-01-31 PROCEDURE — 6360000002 HC RX W HCPCS: Performed by: INTERNAL MEDICINE

## 2022-01-31 PROCEDURE — 96365 THER/PROPH/DIAG IV INF INIT: CPT

## 2022-01-31 PROCEDURE — 2580000003 HC RX 258: Performed by: INTERNAL MEDICINE

## 2022-01-31 RX ADMIN — ERTAPENEM 1000 MG: 1 INJECTION INTRAMUSCULAR; INTRAVENOUS at 12:02

## 2022-02-01 ENCOUNTER — HOSPITAL ENCOUNTER (OUTPATIENT)
Dept: INFUSION THERAPY | Age: 47
Setting detail: INFUSION SERIES
Discharge: HOME | End: 2022-02-01

## 2022-02-01 VITALS
SYSTOLIC BLOOD PRESSURE: 113 MMHG | HEART RATE: 94 BPM | RESPIRATION RATE: 18 BRPM | DIASTOLIC BLOOD PRESSURE: 67 MMHG | TEMPERATURE: 98 F

## 2022-02-01 DIAGNOSIS — K35.33 ACUTE APPENDICITIS WITH PERFORATION, LOCALIZED PERITONITIS, AND ABSCESS, WITHOUT GANGRENE: Primary | ICD-10-CM

## 2022-02-01 DIAGNOSIS — K35.32 APPENDICITIS WITH PERFORATION: ICD-10-CM

## 2022-02-01 PROCEDURE — 96365 THER/PROPH/DIAG IV INF INIT: CPT

## 2022-02-01 PROCEDURE — 6360000002 HC RX W HCPCS: Performed by: INTERNAL MEDICINE

## 2022-02-01 PROCEDURE — 2580000003 HC RX 258: Performed by: INTERNAL MEDICINE

## 2022-02-01 RX ADMIN — ERTAPENEM 1000 MG: 1 INJECTION INTRAMUSCULAR; INTRAVENOUS at 12:10

## 2022-02-02 ENCOUNTER — HOSPITAL ENCOUNTER (OUTPATIENT)
Dept: INFUSION THERAPY | Age: 47
Setting detail: INFUSION SERIES
Discharge: HOME | End: 2022-02-02

## 2022-02-02 VITALS
HEART RATE: 88 BPM | RESPIRATION RATE: 18 BRPM | DIASTOLIC BLOOD PRESSURE: 77 MMHG | SYSTOLIC BLOOD PRESSURE: 111 MMHG | TEMPERATURE: 98 F

## 2022-02-02 DIAGNOSIS — K35.33 ACUTE APPENDICITIS WITH PERFORATION, LOCALIZED PERITONITIS, AND ABSCESS, WITHOUT GANGRENE: Primary | ICD-10-CM

## 2022-02-02 DIAGNOSIS — K35.32 APPENDICITIS WITH PERFORATION: ICD-10-CM

## 2022-02-02 PROCEDURE — 2580000003 HC RX 258: Performed by: INTERNAL MEDICINE

## 2022-02-02 PROCEDURE — 6360000002 HC RX W HCPCS: Performed by: INTERNAL MEDICINE

## 2022-02-02 PROCEDURE — 96365 THER/PROPH/DIAG IV INF INIT: CPT

## 2022-02-02 RX ADMIN — ERTAPENEM 1000 MG: 1 INJECTION INTRAMUSCULAR; INTRAVENOUS at 12:00

## 2022-02-03 ENCOUNTER — HOSPITAL ENCOUNTER (OUTPATIENT)
Dept: INFUSION THERAPY | Age: 47
Setting detail: INFUSION SERIES
Discharge: HOME | End: 2022-02-03

## 2022-02-03 ENCOUNTER — OFFICE VISIT (OUTPATIENT)
Dept: INFECTIOUS DISEASES | Age: 47
End: 2022-02-03

## 2022-02-03 VITALS
RESPIRATION RATE: 18 BRPM | BODY MASS INDEX: 28.99 KG/M2 | TEMPERATURE: 98.4 F | WEIGHT: 214 LBS | DIASTOLIC BLOOD PRESSURE: 78 MMHG | HEART RATE: 78 BPM | SYSTOLIC BLOOD PRESSURE: 121 MMHG | HEIGHT: 72 IN

## 2022-02-03 VITALS
RESPIRATION RATE: 18 BRPM | SYSTOLIC BLOOD PRESSURE: 141 MMHG | TEMPERATURE: 98.6 F | DIASTOLIC BLOOD PRESSURE: 82 MMHG | HEART RATE: 73 BPM

## 2022-02-03 DIAGNOSIS — K35.33 ACUTE APPENDICITIS WITH PERFORATION, LOCALIZED PERITONITIS, AND ABSCESS, WITHOUT GANGRENE: Primary | ICD-10-CM

## 2022-02-03 DIAGNOSIS — K35.32 APPENDICITIS WITH PERFORATION: ICD-10-CM

## 2022-02-03 PROCEDURE — 6360000002 HC RX W HCPCS: Performed by: INTERNAL MEDICINE

## 2022-02-03 PROCEDURE — 99213 OFFICE O/P EST LOW 20 MIN: CPT | Performed by: INTERNAL MEDICINE

## 2022-02-03 PROCEDURE — 2580000003 HC RX 258: Performed by: INTERNAL MEDICINE

## 2022-02-03 PROCEDURE — 96365 THER/PROPH/DIAG IV INF INIT: CPT

## 2022-02-03 RX ADMIN — ERTAPENEM 1000 MG: 1 INJECTION INTRAMUSCULAR; INTRAVENOUS at 10:15

## 2022-02-03 NOTE — FLOWSHEET NOTE
Health Maintenance Due   Topic Date Due   • COVID-19 Vaccine (1) Never done   • Shingles Vaccine (1 of 2) Never done   • Influenza Vaccine (1) 09/01/2021       Patient is due for topics as listed above but is not proceeding with Immunization(s) COVID-19, Influenza and Shingles at this time.        Midline removed, patient tolerated well. Dressing applied, patient informed to keep dressing clean dry and intact and maintain limited activity for 24 hours. Patient verbalized knowledge and understanding.

## 2022-02-03 NOTE — PROGRESS NOTES
Subjective:      Patient ID: Allie Pro is a 40 y.o. male who presents today for:  Chief Complaint   Patient presents with    Appendix     appendicitis and apendectomy       Patient here for follow-up regarding perforated appendicitis with peritonitis abscess. He underwent surgery 1/11/2022. Hospitalized for about a week complicated by small bowel obstruction. He is doing well is eating pretty close to normal diet is follow-up with surgery told to follow-up as needed no fevers chills night sweats no further abdominal pain still getting IV antibiotics with Invanz. No past medical history on file. Past Surgical History:   Procedure Laterality Date    IR MIDLINE CATH  1/13/2022    IR MIDLINE CATH 1/13/2022 [facility] SPECIAL PROCEDURE    LAPAROSCOPIC APPENDECTOMY N/A 1/11/2022    LAPAROSCOPIC APPENDECTOMY, PATIENT COMING FROM [place] performed by Rodri Jean MD at [facility]     No Known Allergies      Review of Systems  See HPI    Objective:   /78   Pulse 78   Temp 98.4 °F (36.9 °C) (Temporal)   Resp 18   Ht 6' (1.829 m)   Wt 214 lb (97.1 kg)   BMI 29.02 kg/m²     General: Patient appears ok at the present time. NAD  Skin: no new rashes  HEENT:  Neck is supple, No subconjunctival hemorrhages, no oral exudates  Heart: S1 S2  Lungs: clear bilaterally   Abdomen: soft, ND, NTTP,   Back :no CVA tenderness  Extrem: No edema, non tender  Neuro exam: CN II-XII intact  Psych: cooperative      Labs: I have reviewed all lab results by electronic record, including most recent CBC, metabolic panel, and pertinent abnormalities were addressed from an infectious disease perspective. Trends are being monitored over time. Radiology:  I have reviewed imaging results per electronic record and most pertinent abnormalities are being addressed from an infectious disease standpoint  Assessment:       Diagnosis Orders   1.  Acute appendicitis with perforation, localized peritonitis, and abscess, without gangrene Plan:      Okay to stop antibiotics. patient's abscess was surgically washed out during hospitalization. His lab work white count has normalized. High Sensitivity CRP mildly elevated below weeks ago but this is nonspecific with recent surgery. Discussed with him that he received a lengthy course of antibiotic therapy after surgical washout and should monitor for any worsening off antibiotics.     Midline can be removed at the infusion center        Andrea Rivera MD

## 2022-02-07 ENCOUNTER — OFFICE VISIT (OUTPATIENT)
Dept: FAMILY MEDICINE CLINIC | Age: 47
End: 2022-02-07

## 2022-02-07 VITALS
SYSTOLIC BLOOD PRESSURE: 130 MMHG | HEART RATE: 90 BPM | BODY MASS INDEX: 29.32 KG/M2 | TEMPERATURE: 98.9 F | WEIGHT: 216.2 LBS | OXYGEN SATURATION: 99 % | DIASTOLIC BLOOD PRESSURE: 70 MMHG

## 2022-02-07 DIAGNOSIS — K35.32 APPENDICITIS WITH PERFORATION: Primary | ICD-10-CM

## 2022-02-07 PROCEDURE — 99204 OFFICE O/P NEW MOD 45 MIN: CPT | Performed by: FAMILY MEDICINE

## 2022-02-07 SDOH — ECONOMIC STABILITY: FOOD INSECURITY: WITHIN THE PAST 12 MONTHS, THE FOOD YOU BOUGHT JUST DIDN'T LAST AND YOU DIDN'T HAVE MONEY TO GET MORE.: NEVER TRUE

## 2022-02-07 SDOH — ECONOMIC STABILITY: FOOD INSECURITY: WITHIN THE PAST 12 MONTHS, YOU WORRIED THAT YOUR FOOD WOULD RUN OUT BEFORE YOU GOT MONEY TO BUY MORE.: NEVER TRUE

## 2022-02-07 ASSESSMENT — SOCIAL DETERMINANTS OF HEALTH (SDOH): HOW HARD IS IT FOR YOU TO PAY FOR THE VERY BASICS LIKE FOOD, HOUSING, MEDICAL CARE, AND HEATING?: NOT HARD AT ALL

## 2022-02-07 ASSESSMENT — PATIENT HEALTH QUESTIONNAIRE - PHQ9
2. FEELING DOWN, DEPRESSED OR HOPELESS: 0
SUM OF ALL RESPONSES TO PHQ9 QUESTIONS 1 & 2: 0
SUM OF ALL RESPONSES TO PHQ QUESTIONS 1-9: 0
1. LITTLE INTEREST OR PLEASURE IN DOING THINGS: 0
SUM OF ALL RESPONSES TO PHQ QUESTIONS 1-9: 0

## 2022-02-07 ASSESSMENT — ENCOUNTER SYMPTOMS
CONSTIPATION: 0
ABDOMINAL PAIN: 0
BLOOD IN STOOL: 0
APNEA: 0
CHEST TIGHTNESS: 0
DIARRHEA: 0
VOMITING: 0
COUGH: 0
SHORTNESS OF BREATH: 0
NAUSEA: 0

## 2022-02-07 NOTE — LETTER
[facility]   [address]              Isabella Coma was under  care he was last seen on 2-7-2022. He doesn't have any other health issues and he is not under any other doctor care at this time.            Thank you,     [facility] ([facility])

## 2022-02-07 NOTE — PROGRESS NOTES
Subjective:      Patient ID: Elba Bragg is a 40 y.o. male who presents today for:     Chief Complaint   Patient presents with   [facility] Follow-up     states he feels well        HPI  Patient is a very pleasant 46-year-old male presents today to establish care. He presents as a follow-up from hospitalization due to appendicitis with perforation. He required IV antibiotics administered today in the infusion center. He has had follow-up with general surgery as well as infectious disease most recently as 4 days ago. He has been taken off of antibiotics and is doing well. He denies any pain. Bowel movements are within normal limits although mildly soft. He denies any fever or chills. Of note, during hospitalization, he was found to have a new onset cardiac murmur. Echocardiogram was performed which showed no significant abnormality. Patient denies any chest pain, shortness of breath or palpitations    History reviewed. No pertinent past medical history. Past Surgical History:   Procedure Laterality Date    IR MIDLINE CATH  2022    IR MIDLINE CATH 2022 MLOZ SPECIAL PROCEDURE    LAPAROSCOPIC APPENDECTOMY N/A 2022    LAPAROSCOPIC APPENDECTOMY, PATIENT COMING FROM [place] performed by Gay Patton MD at [facility]     History reviewed. No pertinent family history. Social History     Socioeconomic History    Marital status: Single     Spouse name: Not on file    Number of children: Not on file    Years of education: Not on file    Highest education level: Not on file   Occupational History    Not on file   Tobacco Use    Smoking status: Former Smoker     Packs/day: 0.50     Types: Cigarettes     Quit date: 2022     Years since quittin.0    Smokeless tobacco: Never Used    Tobacco comment: quit 2022   Substance and Sexual Activity    Alcohol use:  Yes    Drug use: Never    Sexual activity: Not on file   Other Topics Concern    Not on file   Social History Narrative    Not on file     Social Determinants of Health     Financial Resource Strain: Low Risk     Difficulty of Paying Living Expenses: Not hard at all   Food Insecurity: No Food Insecurity    Worried About Running Out of Food in the Last Year: Never true    [address] in the Last Year: Never true   Transportation Needs:     Lack of Transportation (Medical): Not on file    Lack of Transportation (Non-Medical): Not on file   Physical Activity:     Days of Exercise per Week: Not on file    Minutes of Exercise per Session: Not on file   Stress:     Feeling of Stress : Not on file   Social Connections:     Frequency of Communication with Friends and Family: Not on file    Frequency of Social Gatherings with Friends and Family: Not on file    Attends Hinduism Services: Not on file    Active Member of [address] [address] [employer] or Organizations: Not on file    Attends Club or Organization Meetings: Not on file    Marital Status: Not on file   Intimate Partner Violence:     Fear of Current or Ex-Partner: Not on file    Emotionally Abused: Not on file    Physically Abused: Not on file    Sexually Abused: Not on file   Housing Stability:     Unable to Pay for Housing in the Last Year: Not on file    Number of [place] in the Last Year: Not on file    Unstable Housing in the Last Year: Not on file     Current Outpatient Medications on File Prior to Visit   Medication Sig Dispense Refill    omeprazole (PRILOSEC) 10 MG delayed release capsule Take 10 mg by mouth as needed Pt states he only takes it maybe once a week unsure of dose        No current facility-administered medications on file prior to visit. Allergies:  Patient has no known allergies. Review of Systems   Constitutional: Negative for activity change, appetite change, chills, fatigue, fever and unexpected weight change. Respiratory: Negative for apnea, cough, chest tightness and shortness of breath.     Cardiovascular: Negative for chest pain, palpitations and leg swelling. Gastrointestinal: Negative for abdominal pain, blood in stool, constipation, diarrhea, nausea and vomiting. Musculoskeletal: Negative for arthralgias. Neurological: Negative for seizures and headaches. Psychiatric/Behavioral: Negative for hallucinations and suicidal ideas. Objective:   /70   Pulse 90   Temp 98.9 °F (37.2 °C) (Infrared)   Wt 216 lb 3.2 oz (98.1 kg)   SpO2 99%   BMI 29.32 kg/m²     Physical Exam  Vitals and nursing note reviewed. Constitutional:       General: He is not in acute distress. Appearance: Normal appearance. He is well-developed. He is not diaphoretic. HENT:      Head: Normocephalic and atraumatic. Nose: Nose normal.      Mouth/Throat:      Mouth: Mucous membranes are moist.      Pharynx: Oropharynx is clear. Eyes:      Conjunctiva/sclera: Conjunctivae normal.      Pupils: Pupils are equal, round, and reactive to light. Cardiovascular:      Rate and Rhythm: Normal rate and regular rhythm. Pulses: Normal pulses. Heart sounds: Murmur heard. No friction rub. No gallop. Pulmonary:      Effort: Pulmonary effort is normal. No respiratory distress. Breath sounds: Normal breath sounds. No wheezing or rales. Chest:      Chest wall: No tenderness. Abdominal:      General: Abdomen is flat. Bowel sounds are normal.      Palpations: Abdomen is soft. Tenderness: There is no abdominal tenderness. Musculoskeletal:      Cervical back: Normal range of motion. Skin:     General: Skin is warm and dry. Neurological:      Mental Status: He is alert and oriented to person, place, and time. Psychiatric:         Behavior: Behavior normal.         Thought Content: Thought content normal.         Judgment: Judgment normal.         Assessment & Plan:     1. Appendicitis with perforation  Patient is doing well with no intervention needed at this time.   Advised that if any abdominal pain begins or if any fever or chills to contact office immediately    He would like to hold on all preventive measures until he is able to obtain insurance    Return in about 1 month (around 3/7/2022) for Physical.    Iveth Sanchez MD

## 2022-02-07 NOTE — LETTER
[facility]   [address]      Chacorta Mazariegos was under  care he was last seen on 2-7-2022. He doesn't have any other health issues and he is not under any other doctor care at this time.           Thank you     [facility] ([facility])

## 2022-04-22 ENCOUNTER — OFFICE VISIT (OUTPATIENT)
Dept: FAMILY MEDICINE CLINIC | Age: 47
End: 2022-04-22
Payer: COMMERCIAL

## 2022-04-22 VITALS
BODY MASS INDEX: 30.22 KG/M2 | HEART RATE: 97 BPM | TEMPERATURE: 98.1 F | OXYGEN SATURATION: 97 % | WEIGHT: 222.8 LBS | DIASTOLIC BLOOD PRESSURE: 62 MMHG | SYSTOLIC BLOOD PRESSURE: 120 MMHG

## 2022-04-22 DIAGNOSIS — R53.83 FATIGUE, UNSPECIFIED TYPE: ICD-10-CM

## 2022-04-22 DIAGNOSIS — R01.1 HEART MURMUR: ICD-10-CM

## 2022-04-22 DIAGNOSIS — Z01.83 BLOOD TYPING ENCOUNTER: ICD-10-CM

## 2022-04-22 DIAGNOSIS — Z11.59 ENCOUNTER FOR HEPATITIS C SCREENING TEST FOR LOW RISK PATIENT: ICD-10-CM

## 2022-04-22 DIAGNOSIS — R00.2 PALPITATIONS: Primary | ICD-10-CM

## 2022-04-22 PROCEDURE — 99214 OFFICE O/P EST MOD 30 MIN: CPT | Performed by: FAMILY MEDICINE

## 2022-04-22 PROCEDURE — G8427 DOCREV CUR MEDS BY ELIG CLIN: HCPCS | Performed by: FAMILY MEDICINE

## 2022-04-22 PROCEDURE — G8417 CALC BMI ABV UP PARAM F/U: HCPCS | Performed by: FAMILY MEDICINE

## 2022-04-22 PROCEDURE — 1036F TOBACCO NON-USER: CPT | Performed by: FAMILY MEDICINE

## 2022-04-22 ASSESSMENT — ENCOUNTER SYMPTOMS
BLOOD IN STOOL: 0
SHORTNESS OF BREATH: 1
VOMITING: 0
APNEA: 0
NAUSEA: 0
ABDOMINAL PAIN: 0
STRIDOR: 0
DIARRHEA: 0
CONSTIPATION: 0
CHOKING: 0
WHEEZING: 0
CHEST TIGHTNESS: 0
COUGH: 0

## 2022-04-22 NOTE — PROGRESS NOTES
Subjective:      Patient ID: Dejah Velez is a 40 y.o. male who presents today for:     Chief Complaint   Patient presents with    Other     pt states provider in hospital heard a heart mur mur     Annual Exam       HPI  Patient is a very pleasant 67-year-old male presents today to follow-up. He has completely recovered after acute appendicitis that was complicated by severe infection. During his hospitalization heart murmur was discovered and an echocardiogram was performed which showed no significant abnormality. However recently patient has been experiencing palpitations and episodes of significant tachycardia that has been unprovoked. He has worn his apple watch and noted that his heart rate may go up to 180 bpm.  He did not detect any abnormal rhythm such as atrial fibrillation. Patient states that when the specific shortness of breath. He denies any current symptoms and denies any chest pain  History reviewed. No pertinent past medical history. Past Surgical History:   Procedure Laterality Date    IR MIDLINE CATH  2022    IR MIDLINE CATH 2022 MLOZ SPECIAL PROCEDURE    LAPAROSCOPIC APPENDECTOMY N/A 2022    LAPAROSCOPIC APPENDECTOMY, PATIENT COMING FROM [place] performed by Dora Truong MD at [facility]     History reviewed. No pertinent family history. Social History     Socioeconomic History    Marital status: Single     Spouse name: Not on file    Number of children: Not on file    Years of education: Not on file    Highest education level: Not on file   Occupational History    Not on file   Tobacco Use    Smoking status: Former Smoker     Packs/day: 0.50     Types: Cigarettes     Quit date: 2022     Years since quittin.2    Smokeless tobacco: Never Used    Tobacco comment: quit 2022   Substance and Sexual Activity    Alcohol use:  Yes    Drug use: Never    Sexual activity: Not on file   Other Topics Concern    Not on file   Social History Narrative    Not on file     Social Determinants of Health     Financial Resource Strain: Low Risk     Difficulty of Paying Living Expenses: Not hard at all   Food Insecurity: No Food Insecurity    Worried About Running Out of Food in the Last Year: Never true    [address] in the Last Year: Never true   Transportation Needs:     Lack of Transportation (Medical): Not on file    Lack of Transportation (Non-Medical): Not on file   Physical Activity:     Days of Exercise per Week: Not on file    Minutes of Exercise per Session: Not on file   Stress:     Feeling of Stress : Not on file   Social Connections:     Frequency of Communication with Friends and Family: Not on file    Frequency of Social Gatherings with Friends and Family: Not on file    Attends Taoist Services: Not on file    Active Member of [address] [place] [address] [employer] or Organizations: Not on file    Attends Club or Organization Meetings: Not on file    Marital Status: Not on file   Intimate Partner Violence:     Fear of Current or Ex-Partner: Not on file    Emotionally Abused: Not on file    Physically Abused: Not on file    Sexually Abused: Not on file   Housing Stability:     Unable to Pay for Housing in the Last Year: Not on file    Number of [place] in the Last Year: Not on file    Unstable Housing in the Last Year: Not on file     Current Outpatient Medications on File Prior to Visit   Medication Sig Dispense Refill    omeprazole (PRILOSEC) 10 MG delayed release capsule Take 10 mg by mouth as needed Pt states he only takes it maybe once a week unsure of dose        No current facility-administered medications on file prior to visit. Allergies:  Patient has no known allergies. Review of Systems   Constitutional: Negative for activity change, appetite change, fatigue, fever and unexpected weight change. Respiratory: Positive for shortness of breath. Negative for apnea, cough, choking, chest tightness, wheezing and stridor.     Cardiovascular: Positive for palpitations. Negative for chest pain and leg swelling. Gastrointestinal: Negative for abdominal pain, blood in stool, constipation, diarrhea, nausea and vomiting. Musculoskeletal: Negative for arthralgias. Neurological: Negative for seizures and headaches. Psychiatric/Behavioral: Negative for hallucinations and suicidal ideas. Objective:   /62   Pulse 97   Temp 98.1 °F (36.7 °C) (Infrared)   Wt 222 lb 12.8 oz (101.1 kg)   SpO2 97%   BMI 30.22 kg/m²     Physical Exam  Vitals and nursing note reviewed. Constitutional:       General: He is not in acute distress. Appearance: Normal appearance. He is well-developed. He is not diaphoretic. HENT:      Head: Normocephalic and atraumatic. Nose: Nose normal.      Mouth/Throat:      Mouth: Mucous membranes are moist.      Pharynx: Oropharynx is clear. Eyes:      Conjunctiva/sclera: Conjunctivae normal.      Pupils: Pupils are equal, round, and reactive to light. Cardiovascular:      Rate and Rhythm: Normal rate and regular rhythm. Heart sounds: Normal heart sounds. No murmur heard. No friction rub. No gallop. Pulmonary:      Effort: Pulmonary effort is normal. No respiratory distress. Breath sounds: Normal breath sounds. No wheezing or rales. Chest:      Chest wall: No tenderness. Abdominal:      General: Abdomen is flat. Bowel sounds are normal.      Palpations: Abdomen is soft. Tenderness: There is no abdominal tenderness. Musculoskeletal:      Cervical back: Normal range of motion. Skin:     General: Skin is warm and dry. Neurological:      Mental Status: He is alert and oriented to person, place, and time. Psychiatric:         Behavior: Behavior normal.         Thought Content: Thought content normal.         Judgment: Judgment normal.         Assessment & Plan:     1.  Palpitations  Given patient's history will check blood work to see if there is any underlying abnormality that could be playing a part within patient's episodic palpitations and tachycardia  Patient may benefit from either Holter or event monitor.  - Ambulatory referral to Cardiology  - CBC with Auto Differential; Future  - Comprehensive Metabolic Panel; Future  - Magnesium; Future  - Lipid, Fasting; Future  - Hepatitis C Antibody; Future  - TSH; Future  - T4, Free; Future    2. Heart murmur  As above  - Ambulatory referral to Cardiology  - Lipid, Fasting; Future    3. Encounter for hepatitis C screening test for low risk patient  - Hepatitis C Antibody; Future      Return in about 3 months (around 7/22/2022), or if symptoms worsen or fail to improve.     Zackary Novak MD

## 2022-04-25 ENCOUNTER — HOSPITAL ENCOUNTER (OUTPATIENT)
Dept: LAB | Age: 47
Discharge: HOME | End: 2022-04-25
Payer: COMMERCIAL

## 2022-04-25 DIAGNOSIS — R01.1 HEART MURMUR: ICD-10-CM

## 2022-04-25 DIAGNOSIS — Z11.59 ENCOUNTER FOR HEPATITIS C SCREENING TEST FOR LOW RISK PATIENT: ICD-10-CM

## 2022-04-25 DIAGNOSIS — R53.83 FATIGUE, UNSPECIFIED TYPE: ICD-10-CM

## 2022-04-25 DIAGNOSIS — R00.2 PALPITATIONS: ICD-10-CM

## 2022-04-25 DIAGNOSIS — Z01.83 BLOOD TYPING ENCOUNTER: ICD-10-CM

## 2022-04-25 LAB
ABO/RH: NORMAL
ALBUMIN SERPL-MCNC: 4.1 G/DL (ref 3.5–4.6)
ALP BLD-CCNC: 96 U/L (ref 35–104)
ALT SERPL-CCNC: 29 U/L (ref 0–41)
ANION GAP SERPL CALCULATED.3IONS-SCNC: 11 MEQ/L (ref 9–15)
ANTIBODY SCREEN: NORMAL
AST SERPL-CCNC: 19 U/L (ref 0–40)
BASOPHILS ABSOLUTE: 0.1 K/UL (ref 0–0.2)
BASOPHILS RELATIVE PERCENT: 1.2 %
BILIRUB SERPL-MCNC: 0.6 MG/DL (ref 0.2–0.7)
BUN BLDV-MCNC: 16 MG/DL (ref 6–20)
CALCIUM SERPL-MCNC: 9.2 MG/DL (ref 8.5–9.9)
CHLORIDE BLD-SCNC: 104 MEQ/L (ref 95–107)
CHOLESTEROL, FASTING: 176 MG/DL (ref 0–199)
CO2: 24 MEQ/L (ref 20–31)
CREAT SERPL-MCNC: 1.08 MG/DL (ref 0.7–1.2)
EOSINOPHILS ABSOLUTE: 0.3 K/UL (ref 0–0.7)
EOSINOPHILS RELATIVE PERCENT: 3.3 %
GFR AFRICAN AMERICAN: >60
GFR NON-AFRICAN AMERICAN: >60
GLOBULIN: 2.8 G/DL (ref 2.3–3.5)
GLUCOSE BLD-MCNC: 117 MG/DL (ref 70–99)
HCT VFR BLD CALC: 40.7 % (ref 42–52)
HDLC SERPL-MCNC: 31 MG/DL (ref 40–59)
HEMOGLOBIN: 13.5 G/DL (ref 14–18)
HEPATITIS C ANTIBODY: NONREACTIVE
LDL CHOLESTEROL CALCULATED: 125 MG/DL (ref 0–129)
LYMPHOCYTES ABSOLUTE: 2.4 K/UL (ref 1–4.8)
LYMPHOCYTES RELATIVE PERCENT: 30.6 %
MAGNESIUM: 2 MG/DL (ref 1.7–2.4)
MCH RBC QN AUTO: 27.4 PG (ref 27–31.3)
MCHC RBC AUTO-ENTMCNC: 33.1 % (ref 33–37)
MCV RBC AUTO: 82.9 FL (ref 80–100)
MONOCYTES ABSOLUTE: 0.8 K/UL (ref 0.2–0.8)
MONOCYTES RELATIVE PERCENT: 9.9 %
NEUTROPHILS ABSOLUTE: 4.2 K/UL (ref 1.4–6.5)
NEUTROPHILS RELATIVE PERCENT: 55 %
PDW BLD-RTO: 13.5 % (ref 11.5–14.5)
PLATELET # BLD: 241 K/UL (ref 130–400)
POTASSIUM SERPL-SCNC: 4.7 MEQ/L (ref 3.4–4.9)
RBC # BLD: 4.91 M/UL (ref 4.7–6.1)
SEX HORMONE BINDING GLOBULIN: 19 NMOL/L (ref 11–80)
SODIUM BLD-SCNC: 139 MEQ/L (ref 135–144)
T4 FREE: 1.26 NG/DL (ref 0.84–1.68)
TESTOSTERONE FREE-NONMALE: 78.2 PG/ML (ref 47–244)
TESTOSTERONE TOTAL: 300 NG/DL (ref 220–1000)
TOTAL PROTEIN: 6.9 G/DL (ref 6.3–8)
TRIGLYCERIDE, FASTING: 99 MG/DL (ref 0–150)
TSH SERPL DL<=0.05 MIU/L-ACNC: 2.03 UIU/ML (ref 0.44–3.86)
WBC # BLD: 7.7 K/UL (ref 4.8–10.8)

## 2022-04-25 PROCEDURE — 80061 LIPID PANEL: CPT

## 2022-04-25 PROCEDURE — 80053 COMPREHEN METABOLIC PANEL: CPT

## 2022-04-25 PROCEDURE — 84439 ASSAY OF FREE THYROXINE: CPT

## 2022-04-25 PROCEDURE — 84443 ASSAY THYROID STIM HORMONE: CPT

## 2022-04-25 PROCEDURE — 86850 RBC ANTIBODY SCREEN: CPT

## 2022-04-25 PROCEDURE — 85025 COMPLETE CBC W/AUTO DIFF WBC: CPT

## 2022-04-25 PROCEDURE — 83735 ASSAY OF MAGNESIUM: CPT

## 2022-04-25 PROCEDURE — 86803 HEPATITIS C AB TEST: CPT

## 2022-04-25 PROCEDURE — 86901 BLOOD TYPING SEROLOGIC RH(D): CPT

## 2022-04-25 PROCEDURE — 84403 ASSAY OF TOTAL TESTOSTERONE: CPT

## 2022-04-25 PROCEDURE — 84270 ASSAY OF SEX HORMONE GLOBUL: CPT

## 2022-04-25 PROCEDURE — 36415 COLL VENOUS BLD VENIPUNCTURE: CPT

## 2022-04-25 PROCEDURE — 86900 BLOOD TYPING SEROLOGIC ABO: CPT

## 2022-05-06 DIAGNOSIS — Z12.11 COLON CANCER SCREENING: Primary | ICD-10-CM

## 2022-05-06 DIAGNOSIS — D64.9 ANEMIA, UNSPECIFIED TYPE: ICD-10-CM

## 2022-05-16 ENCOUNTER — OFFICE VISIT (OUTPATIENT)
Dept: CARDIOLOGY CLINIC | Age: 47
End: 2022-05-16
Payer: COMMERCIAL

## 2022-05-16 VITALS
BODY MASS INDEX: 29.8 KG/M2 | WEIGHT: 220 LBS | HEIGHT: 72 IN | SYSTOLIC BLOOD PRESSURE: 126 MMHG | OXYGEN SATURATION: 98 % | RESPIRATION RATE: 18 BRPM | DIASTOLIC BLOOD PRESSURE: 76 MMHG | HEART RATE: 98 BPM

## 2022-05-16 DIAGNOSIS — R00.0 TACHYCARDIA: Primary | ICD-10-CM

## 2022-05-16 DIAGNOSIS — R06.09 DOE (DYSPNEA ON EXERTION): ICD-10-CM

## 2022-05-16 PROCEDURE — G8427 DOCREV CUR MEDS BY ELIG CLIN: HCPCS | Performed by: INTERNAL MEDICINE

## 2022-05-16 PROCEDURE — 1036F TOBACCO NON-USER: CPT | Performed by: INTERNAL MEDICINE

## 2022-05-16 PROCEDURE — G8417 CALC BMI ABV UP PARAM F/U: HCPCS | Performed by: INTERNAL MEDICINE

## 2022-05-16 PROCEDURE — 99204 OFFICE O/P NEW MOD 45 MIN: CPT | Performed by: INTERNAL MEDICINE

## 2022-05-16 ASSESSMENT — ENCOUNTER SYMPTOMS
WHEEZING: 0
GASTROINTESTINAL NEGATIVE: 1
BLOOD IN STOOL: 0
STRIDOR: 0
NAUSEA: 0
SHORTNESS OF BREATH: 1
CHEST TIGHTNESS: 0
COUGH: 0
EYES NEGATIVE: 1

## 2022-05-16 NOTE — PROGRESS NOTES
NEW PATIENT        Patient: Sherryle Brakeman  YOB: 1975  MRN: [id number]    Chief Complaint: [address] [place]  Chief Complaint   Patient presents with   [facility] Cow[facility] Establ[facility] Cardiologist     referral Dr. [facility]    Tachycardia       CV Data:    Subjective/HPI 22 Ruptured appendiz and had prolonged hosp. Had long term iv ABX. Since this event he has noted Tachycardia and KNIGHT. Prior to this he was active  Tachy can occur at rest with no exertion. Echo done in 2022 was suboptimal as pt was so tachy. Will need repeat    EKG: ST     Work - sheet metal  Lives alone   [employer]oker  No etoh  No FH    Past Medical History:   Diagnosis Date    Murmur        Past Surgical History:   Procedure Laterality Date    IR MIDLINE CATH  2022    IR MIDLINE CATH 2022 MLOZ SPECIAL PROCEDURE    LAPAROSCOPIC APPENDECTOMY N/A 2022    LAPAROSCOPIC APPENDECTOMY, PATIENT COMING FROM [place] performed by Vanessa Hadley MD at [facility]       History reviewed. No pertinent family history. Social History     Socioeconomic History    Marital status: Single     Spouse name: None    Number of children: None    Years of education: None    Highest education level: None   Occupational History    None   Tobacco Use    Smoking status: Former Smoker     Packs/day: 0.50     Types: Cigarettes     Quit date: 2022     Years since quittin.3    Smokeless tobacco: Never Used    Tobacco comment: quit 2022   Substance and Sexual Activity    Alcohol use:  Yes    Drug use: Never    Sexual activity: None   Other Topics Concern    None   Social History Narrative    None     Social Determinants of Health     Financial Resource Strain: Low Risk     Difficulty of Paying Living Expenses: Not hard at all   Food Insecurity: No Food Insecurity    Worried About Running Out of Food in the Last Year: Never true    Kay of Food in the Last Year: Never true Transportation Needs:     Lack of Transportation (Medical): Not on file    Lack of Transportation (Non-Medical): Not on file   Physical Activity:     Days of Exercise per Week: Not on file    Minutes of Exercise per Session: Not on file   Stress:     Feeling of Stress : Not on file   Social Connections:     Frequency of Communication with Friends and Family: Not on file    Frequency of Social Gatherings with Friends and Family: Not on file    Attends Taoism Services: Not on file    Active Member of [address] [place] [address] [employer] or Organizations: Not on file    Attends Club or Organization Meetings: Not on file    Marital Status: Not on file   Intimate Partner Violence:     Fear of Current or Ex-Partner: Not on file    Emotionally Abused: Not on file    Physically Abused: Not on file    Sexually Abused: Not on file   Housing Stability:     Unable to Pay for Housing in the Last Year: Not on file    Number of [place] in the Last Year: Not on file    Unstable Housing in the Last Year: Not on file       No Known Allergies    Current Outpatient Medications   Medication Sig Dispense Refill    omeprazole (PRILOSEC) 10 MG delayed release capsule Take 10 mg by mouth as needed Pt states he only takes it maybe once a week unsure of dose        No current facility-administered medications for this visit. Review of Systems:   Review of Systems   Constitutional: Negative. Negative for diaphoresis and fatigue. HENT: Negative. Eyes: Negative. Respiratory: Positive for shortness of breath. Negative for cough, chest tightness, wheezing and stridor. Cardiovascular: Positive for palpitations. Negative for chest pain and leg swelling. Gastrointestinal: Negative. Negative for blood in stool and nausea. Genitourinary: Negative. Musculoskeletal: Negative. Skin: Negative. Neurological: Negative. Negative for dizziness, syncope, weakness and light-headedness. Hematological: Negative. Psychiatric/Behavioral: Negative. Physical Examination:    /76 (Site: Right Upper Arm, Position: Sitting, Cuff Size: Medium Adult)   Pulse 98   Resp 18   Ht 6' (1.829 m)   Wt 220 lb (99.8 kg)   SpO2 98%   BMI 29.84 kg/m²    Physical Exam   Constitutional: He appears healthy. No distress. HENT:   Normal cephalic and Atraumatic   Eyes: Pupils are equal, round, and reactive to light. Neck: Thyroid normal. No JVD present. No neck adenopathy. No thyromegaly present. Cardiovascular: Normal rate, regular rhythm, intact distal pulses and normal pulses. Murmur heard. Pulmonary/Chest: Effort normal and breath sounds normal. He has no wheezes. He has no rales. He exhibits no tenderness. Abdominal: Soft. Bowel sounds are normal. There is no abdominal tenderness. Musculoskeletal:         General: No tenderness or edema. Normal range of motion. Cervical back: Normal range of motion and neck supple. Neurological: He is alert and oriented to person, place, and time. Skin: Skin is warm. No cyanosis. Nails show no clubbing.        LABS:  CBC:   Lab Results   Component Value Date    WBC 7.7 04/25/2022    RBC 4.91 04/25/2022    HGB 13.5 04/25/2022    HCT 40.7 04/25/2022    MCV 82.9 04/25/2022    MCH 27.4 04/25/2022    MCHC 33.1 04/25/2022    RDW 13.5 04/25/2022     04/25/2022     Lipids:No results found for: CHOL  No results found for: TRIG  Lab Results   Component Value Date    HDL 31 (L) 04/25/2022     Lab Results   Component Value Date    LDLCALC 125 04/25/2022     No results found for: LABVLDL, VLDL  No results found for: CHOLHDLRATIO  CMP:    Lab Results   Component Value Date     04/25/2022    K 4.7 04/25/2022     04/25/2022    CO2 24 04/25/2022    BUN 16 04/25/2022    CREATININE 1.08 04/25/2022    GFRAA >60.0 04/25/2022    LABGLOM >60.0 04/25/2022    GLUCOSE 117 04/25/2022    PROT 6.9 04/25/2022    LABALBU 4.1 04/25/2022    CALCIUM 9.2 04/25/2022    BILITOT 0.6

## 2022-05-26 ENCOUNTER — HOSPITAL ENCOUNTER (OUTPATIENT)
Dept: LAB | Age: 47
Discharge: HOME | End: 2022-05-26
Payer: COMMERCIAL

## 2022-05-26 ENCOUNTER — HOSPITAL ENCOUNTER (OUTPATIENT)
Dept: GENERAL RADIOLOGY | Age: 47
Discharge: HOME | End: 2022-05-28
Payer: COMMERCIAL

## 2022-05-26 ENCOUNTER — HOSPITAL ENCOUNTER (OUTPATIENT)
Age: 47
Discharge: HOME | End: 2022-05-28
Payer: COMMERCIAL

## 2022-05-26 ENCOUNTER — APPOINTMENT (OUTPATIENT)
Dept: CT IMAGING | Age: 47
End: 2022-05-26
Payer: COMMERCIAL

## 2022-05-26 ENCOUNTER — OFFICE VISIT (OUTPATIENT)
Dept: FAMILY MEDICINE CLINIC | Age: 47
End: 2022-05-26
Payer: COMMERCIAL

## 2022-05-26 ENCOUNTER — HOSPITAL ENCOUNTER (EMERGENCY)
Age: 47
Discharge: HOME | End: 2022-05-26
Attending: EMERGENCY MEDICINE
Payer: COMMERCIAL

## 2022-05-26 ENCOUNTER — TELEPHONE (OUTPATIENT)
Dept: FAMILY MEDICINE CLINIC | Age: 47
End: 2022-05-26

## 2022-05-26 VITALS
DIASTOLIC BLOOD PRESSURE: 79 MMHG | TEMPERATURE: 98.7 F | BODY MASS INDEX: 29.39 KG/M2 | HEIGHT: 72 IN | WEIGHT: 217 LBS | SYSTOLIC BLOOD PRESSURE: 141 MMHG | HEART RATE: 84 BPM | OXYGEN SATURATION: 98 % | RESPIRATION RATE: 16 BRPM

## 2022-05-26 VITALS
WEIGHT: 217.4 LBS | HEART RATE: 90 BPM | OXYGEN SATURATION: 99 % | BODY MASS INDEX: 29.48 KG/M2 | DIASTOLIC BLOOD PRESSURE: 68 MMHG | TEMPERATURE: 97.6 F | SYSTOLIC BLOOD PRESSURE: 110 MMHG

## 2022-05-26 DIAGNOSIS — R00.2 PALPITATIONS: Primary | ICD-10-CM

## 2022-05-26 DIAGNOSIS — R06.00 DYSPNEA, UNSPECIFIED TYPE: ICD-10-CM

## 2022-05-26 DIAGNOSIS — R91.1 PULMONARY NODULE: ICD-10-CM

## 2022-05-26 DIAGNOSIS — R06.02 SHORTNESS OF BREATH: ICD-10-CM

## 2022-05-26 DIAGNOSIS — D64.9 ANEMIA, UNSPECIFIED TYPE: ICD-10-CM

## 2022-05-26 DIAGNOSIS — R79.89 ELEVATED D-DIMER: ICD-10-CM

## 2022-05-26 DIAGNOSIS — R06.02 SHORTNESS OF BREATH: Primary | ICD-10-CM

## 2022-05-26 LAB
ANION GAP SERPL CALCULATED.3IONS-SCNC: 13 MEQ/L (ref 9–15)
BASOPHILS ABSOLUTE: 0.1 K/UL (ref 0–0.1)
BASOPHILS RELATIVE PERCENT: 0.6 % (ref 0.2–1.2)
BUN BLDV-MCNC: 21 MG/DL (ref 6–20)
CALCIUM SERPL-MCNC: 9.4 MG/DL (ref 8.5–9.9)
CHLORIDE BLD-SCNC: 103 MEQ/L (ref 95–107)
CO2: 25 MEQ/L (ref 20–31)
CREAT SERPL-MCNC: 0.98 MG/DL (ref 0.7–1.2)
D DIMER: 0.66 MG/L FEU (ref 0–0.5)
EOSINOPHILS ABSOLUTE: 0.2 K/UL (ref 0–0.5)
EOSINOPHILS RELATIVE PERCENT: 2.3 % (ref 0.8–7)
GFR AFRICAN AMERICAN: >60
GFR NON-AFRICAN AMERICAN: >60
GLUCOSE BLD-MCNC: 100 MG/DL (ref 70–99)
HCT VFR BLD CALC: 42.4 % (ref 42–52)
HEMOGLOBIN: 14 G/DL (ref 13.7–17.5)
IMMATURE GRANULOCYTES #: 0 K/UL
IMMATURE GRANULOCYTES %: 0.5 %
LYMPHOCYTES ABSOLUTE: 2.7 K/UL (ref 1.3–3.6)
LYMPHOCYTES RELATIVE PERCENT: 34.3 %
MCH RBC QN AUTO: 26.9 PG (ref 25.7–32.2)
MCHC RBC AUTO-ENTMCNC: 33 % (ref 32.3–36.5)
MCV RBC AUTO: 81.5 FL (ref 79–92.2)
MONOCYTES ABSOLUTE: 0.9 K/UL (ref 0.3–0.8)
MONOCYTES RELATIVE PERCENT: 11 % (ref 5.3–12.2)
NEUTROPHILS ABSOLUTE: 4 K/UL (ref 1.8–5.4)
NEUTROPHILS RELATIVE PERCENT: 51.3 % (ref 34–67.9)
PDW BLD-RTO: 12.8 % (ref 11.6–14.4)
PLATELET # BLD: 292 K/UL (ref 163–337)
POTASSIUM SERPL-SCNC: 4 MEQ/L (ref 3.4–4.9)
RBC # BLD: 5.2 M/UL (ref 4.63–6.08)
SODIUM BLD-SCNC: 141 MEQ/L (ref 135–144)
TROPONIN: <0.01 NG/ML (ref 0–0.01)
WBC # BLD: 7.8 K/UL (ref 4.2–9)

## 2022-05-26 PROCEDURE — 99214 OFFICE O/P EST MOD 30 MIN: CPT | Performed by: FAMILY MEDICINE

## 2022-05-26 PROCEDURE — 80048 BASIC METABOLIC PNL TOTAL CA: CPT

## 2022-05-26 PROCEDURE — 93005 ELECTROCARDIOGRAM TRACING: CPT

## 2022-05-26 PROCEDURE — 1036F TOBACCO NON-USER: CPT | Performed by: FAMILY MEDICINE

## 2022-05-26 PROCEDURE — G8427 DOCREV CUR MEDS BY ELIG CLIN: HCPCS | Performed by: FAMILY MEDICINE

## 2022-05-26 PROCEDURE — 85025 COMPLETE CBC W/AUTO DIFF WBC: CPT

## 2022-05-26 PROCEDURE — 36415 COLL VENOUS BLD VENIPUNCTURE: CPT

## 2022-05-26 PROCEDURE — 82607 VITAMIN B-12: CPT

## 2022-05-26 PROCEDURE — 82746 ASSAY OF FOLIC ACID SERUM: CPT

## 2022-05-26 PROCEDURE — 83550 IRON BINDING TEST: CPT

## 2022-05-26 PROCEDURE — 6360000004 HC RX CONTRAST MEDICATION: Performed by: EMERGENCY MEDICINE

## 2022-05-26 PROCEDURE — 71275 CT ANGIOGRAPHY CHEST: CPT

## 2022-05-26 PROCEDURE — 83540 ASSAY OF IRON: CPT

## 2022-05-26 PROCEDURE — 99285 EMERGENCY DEPT VISIT HI MDM: CPT

## 2022-05-26 PROCEDURE — 84484 ASSAY OF TROPONIN QUANT: CPT

## 2022-05-26 PROCEDURE — 71046 X-RAY EXAM CHEST 2 VIEWS: CPT

## 2022-05-26 PROCEDURE — 85379 FIBRIN DEGRADATION QUANT: CPT

## 2022-05-26 PROCEDURE — G8417 CALC BMI ABV UP PARAM F/U: HCPCS | Performed by: FAMILY MEDICINE

## 2022-05-26 RX ADMIN — IOPAMIDOL 100 ML: 755 INJECTION, SOLUTION INTRAVENOUS at 18:26

## 2022-05-26 ASSESSMENT — ENCOUNTER SYMPTOMS
VOMITING: 0
NAUSEA: 0
SINUS PAIN: 0
ABDOMINAL PAIN: 0
BACK PAIN: 0
APNEA: 0
SORE THROAT: 0
CHEST TIGHTNESS: 0
NAUSEA: 0
COUGH: 0
SHORTNESS OF BREATH: 1
ABDOMINAL PAIN: 0
EYE DISCHARGE: 0
DIARRHEA: 0
SHORTNESS OF BREATH: 1
COLOR CHANGE: 0
CONSTIPATION: 0
VOMITING: 0
BLOOD IN STOOL: 0
EYE REDNESS: 0
DIARRHEA: 0
COUGH: 0

## 2022-05-26 ASSESSMENT — PAIN - FUNCTIONAL ASSESSMENT
PAIN_FUNCTIONAL_ASSESSMENT: NONE - DENIES PAIN

## 2022-05-26 NOTE — PROGRESS NOTES
Subjective:      Patient ID: Iesha Hdez is a 40 y.o. male who presents today for:     Chief Complaint   Patient presents with    Follow-up       HPI  Patient is a very pleasant 43-year-old male presents today to follow-up on sensation of shortness of breath and tachycardia. He has been evaluated by cardiology and is currently wearing a Holter monitor. He states that the sensation of palpitations and shortness of breath occur without notice and may last minutes at most.  The symptoms are not reproducible and patient states that at times he is walking and it will occur but then resolve. Also he is able to complete a full workout without any symptoms or limitations. He is scheduled for a repeat echocardiogram as well as a stress test.  He currently denies any chest discomfort, shortness of breath or lower extremity edema. The last time that he had symptoms, was yesterday but they only lasted for less than a minute. Past Medical History:   Diagnosis Date    Murmur      Past Surgical History:   Procedure Laterality Date    IR MIDLINE CATH  2022    IR MIDLINE CATH 2022 MLOZ SPECIAL PROCEDURE    LAPAROSCOPIC APPENDECTOMY N/A 2022    LAPAROSCOPIC APPENDECTOMY, PATIENT COMING FROM [place] performed by Mike Marshall MD at [facility]     History reviewed. No pertinent family history. Social History     Socioeconomic History    Marital status: Single     Spouse name: Not on file    Number of children: Not on file    Years of education: Not on file    Highest education level: Not on file   Occupational History    Not on file   Tobacco Use    Smoking status: Former Smoker     Packs/day: 0.50     Types: Cigarettes     Quit date: 2022     Years since quittin.3    Smokeless tobacco: Never Used    Tobacco comment: quit 2022   Substance and Sexual Activity    Alcohol use:  Yes    Drug use: Never    Sexual activity: Not on file   Other Topics Concern    Not on file   Social History Narrative    Not on file     Social Determinants of Health     Financial Resource Strain: Low Risk     Difficulty of Paying Living Expenses: Not hard at all   Food Insecurity: No Food Insecurity    Worried About Running Out of Food in the Last Year: Never true    [address] in the Last Year: Never true   Transportation Needs:     Lack of Transportation (Medical): Not on file    Lack of Transportation (Non-Medical): Not on file   Physical Activity:     Days of Exercise per Week: Not on file    Minutes of Exercise per Session: Not on file   Stress:     Feeling of Stress : Not on file   Social Connections:     Frequency of Communication with Friends and Family: Not on file    Frequency of Social Gatherings with Friends and Family: Not on file    Attends [employer] Services: Not on file    Active Member of [address] [place] [address] [employer] or Organizations: Not on file    Attends Club or Organization Meetings: Not on file    Marital Status: Not on file   Intimate Partner Violence:     Fear of Current or Ex-Partner: Not on file    Emotionally Abused: Not on file    Physically Abused: Not on file    Sexually Abused: Not on file   Housing Stability:     Unable to Pay for Housing in the Last Year: Not on file    Number of [place] in the Last Year: Not on file    Unstable Housing in the Last Year: Not on file     Current Outpatient Medications on File Prior to Visit   Medication Sig Dispense Refill    omeprazole (PRILOSEC) 10 MG delayed release capsule Take 10 mg by mouth as needed Pt states he only takes it maybe once a week unsure of dose        No current facility-administered medications on file prior to visit. Allergies:  Patient has no known allergies. Review of Systems   Constitutional: Negative for activity change, appetite change, fatigue, fever and unexpected weight change. Respiratory: Positive for shortness of breath (intermittent and unpredictable). Negative for apnea, cough and chest tightness. etiology. Given that symptoms occurred after surgery will obtain a ddimer to rule out pulmonary embolism  Even though patient has normal saturations and is not currently tachycardic.  - Full PFT Study With Bronchodilator; Future  - XR CHEST STANDARD (2 VW); Future  - D-Dimer, Quantitative; Future    2. Anemia, unspecified type  Mild, but has not resolved since surgery therefore will have patient follow-up on stool occult, iron levels and B12  - Vitamin B12 & Folate; Future      Return in about 2 months (around 7/26/2022), or if symptoms worsen or fail to improve, for chronic condtions.     Saul Damian MD

## 2022-05-26 NOTE — ED PROVIDER NOTES
[facility] ED  EMERGENCY DEPARTMENT ENCOUNTER      Pt Name: Sherryle Brakeman  MRN: [id number]  [place] 1975  Date of evaluation: 5/26/2022  Provider: Sanjeev Navarrete DO    CHIEF COMPLAINT       Chief Complaint   Patient presents with    Abnormal Lab     elevated D-Dimer sent by doctor's office     Shortness of Breath     states SOB since appendix surgery in January      Chief complaint: My doctor's office called me to come in  History of chief complaint: This 80-year-old gentleman presents the emergency department complaining of having blood work drawn at his doctor's office this morning and receiving a call this evening that his D-dimer test was elevated and he needed to come to the ER. States he has been having ongoing issues over the past 5 months with shortness of breath and a new heart murmur. Patient states he had a ruptured appendicitis back in January states he underwent surgery and had several weeks of IV antibiotics during his hospitalization they noticed he had a new murmur patient states he did have an echo the initial test seemed okay. Patient states he is following with cardiology. Patient states since the surgery he will notice his heart rate goes up intermittently seems random states he can run on the treadmill at the gym and not get short of breath and have a normal heart rate and then be living at home with the couch and his heart rate will go up to 150. Patient states he will get random episodes of shortness of breath as well states they just seem to come and go. Patient denies any sore throat cough cold fevers chills weak or dizzy feeling no chest pain patient states he can feel a sense of palpitation when his heart rate goes high patient denies any abdominal pain vomiting or diarrhea no leg pain or swelling no recent travel history no history of DVT or PE. Nursing Notes were reviewed.     REVIEW OF SYSTEMS    (2-9 systems for level 4, 10 or more for level 5)     Review of Systems   Constitutional: Negative for chills, diaphoresis and fever. HENT: Negative for congestion, sinus pain and sore throat. Eyes: Negative for discharge and redness. Respiratory: Positive for shortness of breath. Negative for cough. Cardiovascular: Positive for palpitations. Negative for chest pain and leg swelling. Gastrointestinal: Negative for abdominal pain, diarrhea, nausea and vomiting. Genitourinary: Negative for difficulty urinating, dysuria, flank pain and frequency. Musculoskeletal: Negative for back pain and neck pain. Skin: Negative for color change and rash. Neurological: Negative for dizziness, weakness and headaches. Hematological: Negative for adenopathy. Except as noted above the remainder of the review of systems was reviewed and negative. PAST MEDICAL HISTORY     Past Medical History:   Diagnosis Date    Murmur          SURGICAL HISTORY       Past Surgical History:   Procedure Laterality Date    IR MIDLINE CATH  2022    IR MIDLINE CATH 2022 MLOZ SPECIAL PROCEDURE    LAPAROSCOPIC APPENDECTOMY N/A 2022    LAPAROSCOPIC APPENDECTOMY, PATIENT COMING FROM [place] performed by Tania Finley MD at [facility]       Previous Medications    OMEPRAZOLE (PRILOSEC) 10 MG DELAYED RELEASE CAPSULE    Take 10 mg by mouth as needed Pt states he only takes it maybe once a week unsure of dose        ALLERGIES     Patient has no known allergies. FAMILY HISTORY     History reviewed. No pertinent family history.        SOCIAL HISTORY       Social History     Socioeconomic History    Marital status: Single     Spouse name: None    Number of children: None    Years of education: None    Highest education level: None   Occupational History    None   Tobacco Use    Smoking status: Former Smoker     Packs/day: 0.50     Types: Cigarettes     Quit date: 2022     Years since quittin.3    Smokeless tobacco: Never Used   First Opinion Tobacco comment: quit 1-   Vaping Use    Vaping Use: Never used   Substance and Sexual Activity    Alcohol use: Not Currently    Drug use: Never    Sexual activity: None   Other Topics Concern    None   Social History Narrative    None     Social Determinants of Health     Financial Resource Strain: Low Risk     Difficulty of Paying Living Expenses: Not hard at all   Food Insecurity: No Food Insecurity    Worried About Running Out of Food in the Last Year: Never true    Kay of Food in the Last Year: Never true   Transportation Needs:     Lack of Transportation (Medical): Not on file    Lack of Transportation (Non-Medical):  Not on file   Physical Activity:     Days of Exercise per Week: Not on file    Minutes of Exercise per Session: Not on file   Stress:     Feeling of Stress : Not on file   Social Connections:     Frequency of Communication with Friends and Family: Not on file    Frequency of Social Gatherings with Friends and Family: Not on file    Attends Episcopal Services: Not on file    Active Member of [address] [place] [address] [employer] or Organizations: Not on file    Attends Club or Organization Meetings: Not on file    Marital Status: Not on file   Intimate Partner Violence:     Fear of Current or Ex-Partner: Not on file    Emotionally Abused: Not on file    Physically Abused: Not on file    Sexually Abused: Not on file   Housing Stability:     Unable to Pay for Housing in the Last Year: Not on file    Number of [place] in the Last Year: Not on file    Unstable Housing in the Last Year: Not on file         PHYSICAL EXAM    (up to 7 for level 4, 8 or more for level 5)     ED Triage Vitals [05/26/22 1744]   BP Temp Temp Source Heart Rate Resp SpO2 Height Weight   (!) 161/89 98.7 °F (37.1 °C) Temporal 100 16 97 % 6' (1.829 m) 217 lb (98.4 kg)       Physical Exam   General appearance: Patient is awake alert interactive appropriate nontoxic in no acute distress, smiling talkative well-appearing  Head is atraumatic normocephalic  Eyes pupils are equal and reactive sclera white conjunctive are pink  Oral pharyngeal cavity is pink with good moisture, no exudates or ulcerations no asymmetry, the airway is widely patent  Neck: Supple no meningeal signs no adenopathy no JVD  Heart: Regular rate and rhythm there is a 2 out of 6 systolic ejection murmur, no rubs or clicks  Lungs: Breath sounds are clear with good air movement throughout no active wheezes rales or rhonchi no respiratory distress, pulse ox 97% on room air  Abdomen: Soft nontender with good bowel sounds no rebound rigidity or guarding no firm or pulsatile masses, no gross distention  Back: Nontender to palpation no costovertebral angle tenderness  Skin: Warm and dry without rashes  Lower extremities: No edema or calf tenderness or asymmetry. DIAGNOSTIC RESULTS     EKG: All EKG's are interpreted by the Emergency Department Physician who either signs or Co-signs this chart in the absence of a cardiologist.    EKG interpreted by ED physician indication palpitation: Normal sinus rhythm at 93 with inferior lateral ST depression, somewhat hyperacute appearing T waves in V2 nonspecific in appearance findings do appear similar when compared to previous EKG of 1/11/2022    RADIOLOGY:   Non-plain film images such as CT, Ultrasound and MRI are read by the radiologist. Plain radiographic images are visualized and preliminarily interpreted by the emergency physician with the below findings:    Interpretation per the Radiologist below, if available at the time of this note:    CTA Chest W WO  (PE study)   Final Result      No pulmonary embolism or acute intrathoracic process. 5 mm right upper lobe nodule. Follow-up is recommended per Fleischner criteria. Cholelithiasis.             All CT scans at this facility use dose modulation, iterative reconstruction, and/or weight based dosing when appropriate to reduce radiation dose to as low as reasonably achievable.         ____________________________________________________________      Fleischner Society 2017 Guidelines for Management of Incidentally Detected Pulmonary Nodules in Adults      A: Solid Nodules*           Single                     Low risk**                        <6 mm     No routine follow-up                                  6-8 mm     CT at 6-12 months, then consider CT at 18-24 months                    >8 mm     Consider CT at 3 months, PET/CT, or tissue sampling   Nodules <6 mm do not require routine follow-up, but certain patients at high risk with suspicious nodule morphology, upper lobe location, or both may warrant 12-month follow-up (recommendation 1A). High risk**                   <6 mm     Optional CT at 12 months                  6-8 mm     CT at 6-12 months, then CT at 18-24 months                    >8 mm     Consider CT at 3 months, PET/CT, or tissue sampling   Nodules <6 mm do not require routine follow-up, but certain patients at high risk with suspicious nodule morphology, upper lobe location, or both may warrant 12-month follow-up (recommendation 1A). Multiple                              Low risk**                   <6 mm     No routine follow-up                  6-8 mm     CT at 3-6 months, then consider CT at 18-24 months                   >8 mm     CT at 3-6 months, then  consider CT at 18-24 months   Use most suspicious nodule as guide to management. Follow-up intervals may vary according to size and risk (recommendation 2A). High risk**                   <6 mm     Optional CT at 12 months                  6-8 mm     CT at 3-6 months, then at 18-24 months                  >8 mm     CT at 3-6 months, then at 18-24 months   Use most suspicious nodule as guide to management. Follow-up intervals may vary according to size and risk (recommendation 2A).          B: Subsolid Nodules*           Single Ground glass                   <6 mm     No routine follow-up                 >=6 mm     CT at 6-12 months to confirm persistence, then CT  every 2 years until 5 years   In certain suspicious nodules <6 mm, consider follow-up at 2 and 4 years. If solid component(s) or growth develops, consider resection. (Recommendations 3A and 4A). Part solid                   <6 mm     No routine follow-up                 >=6 mm     CT at 3-6 months to confirm persistence. If unchanged and solid component remains <6 mm, annual CT  should be performed for 5 years. In practice, part-solid nodules cannot be defined as such until >=6 mm, and nodules <6 mm do not usually require follow-up. Persistent part-solid nodules with solid components >=6 mm should be considered highly suspicious (recommendations 4A-4C)           Multiple                   <6 mm     CT at 3-6 months. If stable, consider CT at 2 and 4 years. >=6 mm     CT at 3-6 months. Subsequent management based  on the most suspicious nodule(s). Multiple <6 mm pure ground-glass nodules are usually benign, but consider follow-up in selected patients at high risk at 2 and 4 years (recommendation 5A). Note. --These recommendations do not apply to lung cancer screening, patients with immunosuppression, or patients with known primary cancer. * Dimensions are average of long and short axes, rounded to the nearest millimeter. ** Consider all relevant risk factors (see Risk Factors).       ____________________________________________________________              LABS:  Labs Reviewed   BASIC METABOLIC PANEL - Abnormal; Notable for the following components:       Result Value    Glucose 100 (*)     BUN 21 (*)     All other components within normal limits   CBC WITH AUTO DIFFERENTIAL - Abnormal; Notable for the following components:    Monocytes Absolute 0.9 (*)     All other components within normal limits   TROPONIN   Laboratory review: CBC BMP troponin essentially normal    All other labs were within normal range or not returned as of this dictation. EMERGENCY DEPARTMENT COURSE and DIFFERENTIAL DIAGNOSIS/MDM:   Vitals:    Vitals:    05/26/22 1744   BP: (!) 161/89   Pulse: 100   Resp: 16   Temp: 98.7 °F (37.1 °C)   TempSrc: Temporal   SpO2: 97%   Weight: 217 lb (98.4 kg)   Height: 6' (1.829 m)     Treatment and course: Patient assessed well-appearing stable vital signs no complaint basic blood work EKG and CT of the chest ordered and performed no findings of pulmonary embolism. FINAL IMPRESSION      1. Palpitations    2. Dyspnea, unspecified type    3. Elevated d-dimer    4. Pulmonary nodule          DISPOSITION/PLAN   DISPOSITION Decision To Discharge 05/26/2022 07:36:53 PM  Patient discharged home advised to continue wearing the Holter monitor as directed monitor closely and return if any change or worsening chest pain persisting palpitations and increased difficulty breathing weak or dizzy. Patient to follow-up with primary physician for repeat assessment in the next 2 to 3 days as well as cardiology as scheduled. PATIENT REFERRED TO:  Annita Berg MD  [facility]  [address] [address] [phone]    In 3 days        DISCHARGE MEDICATIONS:  New Prescriptions    No medications on file     Controlled Substances Monitoring:     No flowsheet data found.     (Please note that portions of this note were completed with a voice recognition program.  Efforts were made to edit the dictations but occasionally words are mis-transcribed.)    Ida Lees DO (electronically signed)  Attending Emergency Physician            Ida Lees DO  05/26/22 1937

## 2022-05-26 NOTE — ED TRIAGE NOTES
Patient sent by pcp following workup as outpatient. Patient currently wearing heart monitor. Reports intermittent shortness of breath and elevated heart rate. A/0 x3, ambulatory, resps even and unlabored on room air.

## 2022-05-26 NOTE — TELEPHONE ENCOUNTER
LAB CALLED WITH A CRITICAL LAB:    Lab states that patient has a elevated  D-Dimer Of 0.66    Please advise

## 2022-05-27 ENCOUNTER — HOSPITAL ENCOUNTER (OUTPATIENT)
Age: 47
Setting detail: SPECIMEN
Discharge: HOME | End: 2022-05-27
Payer: COMMERCIAL

## 2022-05-27 ENCOUNTER — TELEPHONE (OUTPATIENT)
Dept: CARDIOLOGY CLINIC | Age: 47
End: 2022-05-27

## 2022-05-27 DIAGNOSIS — I48.91 NEW ONSET ATRIAL FIBRILLATION (HCC): ICD-10-CM

## 2022-05-27 DIAGNOSIS — D64.9 ANEMIA, UNSPECIFIED TYPE: ICD-10-CM

## 2022-05-27 DIAGNOSIS — R00.0 TACHYCARDIA: Primary | ICD-10-CM

## 2022-05-27 LAB
EKG ATRIAL RATE: 93 BPM
EKG P AXIS: 39 DEGREES
EKG P-R INTERVAL: 142 MS
EKG Q-T INTERVAL: 346 MS
EKG QRS DURATION: 88 MS
EKG QTC CALCULATION (BAZETT): 430 MS
EKG R AXIS: 65 DEGREES
EKG T AXIS: -32 DEGREES
EKG VENTRICULAR RATE: 93 BPM
FOLATE: 17 NG/ML
IRON SATURATION: 20 % (ref 20–55)
IRON: 79 UG/DL (ref 59–158)
TOTAL IRON BINDING CAPACITY: 386 UG/DL (ref 250–450)
UNSATURATED IRON BINDING CAPACITY: 307 UG/DL (ref 112–347)
VITAMIN B-12: 725 PG/ML (ref 232–1245)

## 2022-05-27 PROCEDURE — 82274 ASSAY TEST FOR BLOOD FECAL: CPT

## 2022-05-27 NOTE — ED NOTES
Explained discharge instructions to patient. Went over discharge diagnosis and pertinent educational material with patient. Patient stated understanding of discharge diagnosis, instructions, and home care strategies. Patient denies any questions at this time, all concerns addressed. No signs or symptoms of pain or distress noted at this time. Patient discharged to home. A/0 x3, ambulatory, resps even and unlabored on room air. Follow up instructions and reasons to return to ER reviewed.       Jerry Harrison RN  05/26/22 2020

## 2022-05-28 LAB — HEMOCCULT STL QL: NORMAL

## 2022-05-31 ENCOUNTER — TELEPHONE (OUTPATIENT)
Dept: CARDIOLOGY CLINIC | Age: 47
End: 2022-05-31

## 2022-05-31 ENCOUNTER — TELEPHONE (OUTPATIENT)
Dept: FAMILY MEDICINE CLINIC | Age: 47
End: 2022-05-31

## 2022-05-31 ENCOUNTER — PATIENT MESSAGE (OUTPATIENT)
Dept: FAMILY MEDICINE CLINIC | Age: 47
End: 2022-05-31

## 2022-05-31 DIAGNOSIS — R00.0 TACHYCARDIA: ICD-10-CM

## 2022-05-31 RX ORDER — METOPROLOL SUCCINATE 50 MG/1
50 TABLET, EXTENDED RELEASE ORAL DAILY
Qty: 30 TABLET | Refills: 5 | Status: ON HOLD | OUTPATIENT
Start: 2022-05-31 | End: 2022-10-31 | Stop reason: HOSPADM

## 2022-05-31 NOTE — TELEPHONE ENCOUNTER
From: Kerwin Abdullahi  To: Dr. Tiffanie Rangel: 5/31/2022 4:42 PM EDT  Subject: Medication    Im very disappointed in this whole process and the decision to put me on medication without having all the results from recommended tests. Especially a medication in xarelto that not only increases my risk for clots if I stop using it but is at an exorbitant expense to me . To be told it is what it is , is not an answer . The lets throw some drugs at it approach without an actual diagnosis as to the root cause of the problem is in itself problematic to me. Telling me to rush to the Emergency room for tests yet scheduled tests through the hospital are months out makes no sense. Months of tests with no answers and having to be the squeaky wheel otherwise getting no feedback is not only frustrating but disappointing. The fact that I am paying to be disappointed and frustrated only adds to my plight.

## 2022-05-31 NOTE — TELEPHONE ENCOUNTER
Spoke with pharmacy and with his insurance which they did not know he has it is a bit over $300. He has a high deductible plan. I have samples in the office and patient will .     Given:  Xarelto 20 mg # 4   Lot # [id number]  Exp. 4/24

## 2022-05-31 NOTE — TELEPHONE ENCOUNTER
Patient came in and wanted to know why he has to be on this medication that Dr Beverly Swan put him on and why is he taking all of these tests when he has not even seen a doctor to explain why he has to do all of this. I made him an appt with you on 6/9/2022 but he wants a phone call so he does not have to wait around until Monday to find out the answer. Saad Lopez also said the heart monitor said that he passed out and he didn't and he is not understanding this heart monitor messing up like this.  cp

## 2022-05-31 NOTE — TELEPHONE ENCOUNTER
Spoke to patient. Explained that I would agree with Dr. Vic Dandy recommendation, but if he had any concerns or questions/reservations, he should make an appointment to discuss in detail. He will provide an update in 48 hours as to how he is feeling on the new medication.   He reports no chest pain, or shortness of breath

## 2022-06-06 ENCOUNTER — OFFICE VISIT (OUTPATIENT)
Dept: FAMILY MEDICINE CLINIC | Age: 47
End: 2022-06-06
Payer: COMMERCIAL

## 2022-06-06 VITALS
BODY MASS INDEX: 30.24 KG/M2 | TEMPERATURE: 97.8 F | HEART RATE: 76 BPM | SYSTOLIC BLOOD PRESSURE: 110 MMHG | DIASTOLIC BLOOD PRESSURE: 70 MMHG | WEIGHT: 223 LBS | OXYGEN SATURATION: 99 %

## 2022-06-06 DIAGNOSIS — R00.2 PALPITATIONS: Primary | ICD-10-CM

## 2022-06-06 DIAGNOSIS — I48.0 PAROXYSMAL ATRIAL FIBRILLATION (HCC): ICD-10-CM

## 2022-06-06 DIAGNOSIS — R01.1 HEART MURMUR: ICD-10-CM

## 2022-06-06 PROCEDURE — G8417 CALC BMI ABV UP PARAM F/U: HCPCS | Performed by: FAMILY MEDICINE

## 2022-06-06 PROCEDURE — 99214 OFFICE O/P EST MOD 30 MIN: CPT | Performed by: FAMILY MEDICINE

## 2022-06-06 PROCEDURE — G8427 DOCREV CUR MEDS BY ELIG CLIN: HCPCS | Performed by: FAMILY MEDICINE

## 2022-06-06 PROCEDURE — 1036F TOBACCO NON-USER: CPT | Performed by: FAMILY MEDICINE

## 2022-06-06 ASSESSMENT — ENCOUNTER SYMPTOMS
CONSTIPATION: 0
NAUSEA: 0
COUGH: 0
DIARRHEA: 0
BLOOD IN STOOL: 0
ABDOMINAL PAIN: 0
VOMITING: 0
CHEST TIGHTNESS: 0
APNEA: 0
SHORTNESS OF BREATH: 0

## 2022-06-06 NOTE — PROGRESS NOTES
Subjective:      Patient ID: Therese Duncan is a 40 y.o. male who presents today for:     Chief Complaint   Patient presents with    Other     recent results       HPI  Patient is a very pleasant 52-year-old male presents today to follow-up. He has been in the care of cardiology and recently wearing a heart monitor. This heart monitor was sent results to cardiology with a determination of atrial fibrillation. Therefore, patient was placed on Xarelto as well as metoprolol. Patient had significant apprehension about starting the medication and has not started it yet. He denies any chest pain, shortness of breath or lower extremity edema. He states that he did feel his heart rate beating quickly but did not feel any other abnormalities at that time. He is requesting further explanation of why he needs the medication. He has an upcoming echocardiogram and stress test at the end of July and he would like to see if this can be done sooner  Past Medical History:   Diagnosis Date    Murmur      Past Surgical History:   Procedure Laterality Date    IR MIDLINE CATH  2022    IR MIDLINE CATH 2022 MLOZ SPECIAL PROCEDURE    LAPAROSCOPIC APPENDECTOMY N/A 2022    LAPAROSCOPIC APPENDECTOMY, PATIENT COMING FROM [place] performed by Priscila Coombs MD at [facility]     No family history on file.   Social History     Socioeconomic History    Marital status: Single     Spouse name: Not on file    Number of children: Not on file    Years of education: Not on file    Highest education level: Not on file   Occupational History    Not on file   Tobacco Use    Smoking status: Former Smoker     Packs/day: 0.50     Types: Cigarettes     Quit date: 2022     Years since quittin.4    Smokeless tobacco: Never Used    Tobacco comment: quit 2022   Vaping Use    Vaping Use: Never used   Substance and Sexual Activity    Alcohol use: Not Currently    Drug use: Never    Sexual activity: Not on file Other Topics Concern    Not on file   Social History Narrative    Not on file     Social Determinants of Health     Financial Resource Strain: Low Risk     Difficulty of Paying Living Expenses: Not hard at all   Food Insecurity: No Food Insecurity    Worried About Running Out of Food in the Last Year: Never true    [address] in the Last Year: Never true   Transportation Needs:     Lack of Transportation (Medical): Not on file    Lack of Transportation (Non-Medical):  Not on file   Physical Activity:     Days of Exercise per Week: Not on file    Minutes of Exercise per Session: Not on file   Stress:     Feeling of Stress : Not on file   Social Connections:     Frequency of Communication with Friends and Family: Not on file    Frequency of Social Gatherings with Friends and Family: Not on file    Attends Jew Services: Not on file    Active Member of [address] [place] [address] [employer] or Organizations: Not on file    Attends Club or Organization Meetings: Not on file    Marital Status: Not on file   Intimate Partner Violence:     Fear of Current or Ex-Partner: Not on file    Emotionally Abused: Not on file    Physically Abused: Not on file    Sexually Abused: Not on file   Housing Stability:     Unable to Pay for Housing in the Last Year: Not on file    Number of [place] in the Last Year: Not on file    Unstable Housing in the Last Year: Not on file     Current Outpatient Medications on File Prior to Visit   Medication Sig Dispense Refill    omeprazole (PRILOSEC) 10 MG delayed release capsule Take 10 mg by mouth as needed Pt states he only takes it maybe once a week unsure of dose       rivaroxaban (XARELTO) 20 MG TABS tablet Take 1 tablet by mouth daily (Patient not taking: Reported on 6/6/2022) 30 tablet 5    metoprolol succinate (TOPROL XL) 50 MG extended release tablet Take 1 tablet by mouth daily (Patient not taking: Reported on 6/6/2022) 30 tablet 5     No current facility-administered medications on file prior to visit. Allergies:  Patient has no known allergies. Review of Systems   Constitutional: Negative for activity change, appetite change and fatigue. Respiratory: Negative for apnea, cough, chest tightness and shortness of breath. Cardiovascular: Positive for palpitations. Negative for chest pain and leg swelling. Gastrointestinal: Negative for abdominal pain, blood in stool, constipation, diarrhea, nausea and vomiting. Musculoskeletal: Negative for arthralgias. Neurological: Negative for seizures and headaches. Psychiatric/Behavioral: Negative for hallucinations and suicidal ideas. Objective:   /70   Pulse 76   Temp 97.8 °F (36.6 °C) (Infrared)   Wt 223 lb (101.2 kg)   SpO2 99%   BMI 30.24 kg/m²     Physical Exam  Vitals and nursing note reviewed. Constitutional:       General: He is not in acute distress. Appearance: Normal appearance. He is well-developed. He is not diaphoretic. HENT:      Head: Normocephalic and atraumatic. Nose: Nose normal.      Mouth/Throat:      Mouth: Mucous membranes are moist.      Pharynx: Oropharynx is clear. Eyes:      Conjunctiva/sclera: Conjunctivae normal.      Pupils: Pupils are equal, round, and reactive to light. Cardiovascular:      Rate and Rhythm: Normal rate and regular rhythm. Heart sounds: Normal heart sounds. No murmur heard. No friction rub. No gallop. Pulmonary:      Effort: Pulmonary effort is normal. No respiratory distress. Breath sounds: Normal breath sounds. No wheezing or rales. Chest:      Chest wall: No tenderness. Abdominal:      General: Abdomen is flat. Bowel sounds are normal.      Palpations: Abdomen is soft. Tenderness: There is no abdominal tenderness. Musculoskeletal:      Cervical back: Normal range of motion. Skin:     General: Skin is warm and dry. Neurological:      Mental Status: He is alert and oriented to person, place, and time.    Psychiatric: Behavior: Behavior normal.         Thought Content: Thought content normal.         Judgment: Judgment normal.         Assessment & Plan:     1. Palpitations  Unclear whether related to paroxysmal atrial fibrillation    2. Heart murmur  Will await upcoming echocardiogram but we will see whether this can be done sooner    3. Paroxysmal atrial fibrillation (HCC)  Discussed benefit versus risk of medication and the purposes of medication. However, we will try to arrange for definitive discussion with cardiology      Return if symptoms worsen or fail to improve.     Lindbergh Spurling, MD

## 2022-06-27 NOTE — TELEPHONE ENCOUNTER
The patient is scheduled for the procedure and cancelled out the office visit we had with him on 7/1/2022.

## 2022-07-27 ENCOUNTER — HOSPITAL ENCOUNTER (OUTPATIENT)
Dept: NON INVASIVE DIAGNOSTICS | Age: 47
Discharge: HOME | End: 2022-07-27
Payer: COMMERCIAL

## 2022-07-27 DIAGNOSIS — R06.09 DOE (DYSPNEA ON EXERTION): ICD-10-CM

## 2022-07-27 DIAGNOSIS — R00.0 TACHYCARDIA: ICD-10-CM

## 2022-07-27 LAB
LV EF: 60 %
LVEF MODALITY: NORMAL

## 2022-07-27 PROCEDURE — 93017 CV STRESS TEST TRACING ONLY: CPT

## 2022-07-27 PROCEDURE — 93306 TTE W/DOPPLER COMPLETE: CPT

## 2022-07-28 PROCEDURE — 93018 CV STRESS TEST I&R ONLY: CPT | Performed by: INTERNAL MEDICINE

## 2022-07-28 NOTE — PROCEDURES
Mariela Martinez [address]                      [address] [place], [address] [facility]                              CARDIAC STRESS TEST    PATIENT NAME: Misty Cobb                         :        1975  MED REC NO:   [id number]                            ROOM:  ACCOUNT NO:   [de-identified]                           ADMIT DATE: 2022  PROVIDER:     Natalee Christine MD    CARDIOVASCULAR DIAGNOSTIC DEPARTMENT    DATE OF STUDY:  2022    REGULAR TREADMILL EXERCISE TEST REPORT    ORDERING PROVIDER:  Jessie Cabrera MD    INDICATIONS:  Shortness of breath. DESCRIPTION OF PROCEDURE:  The patient was stressed according to the  Juan Miguel protocol for 6 minutes and 5 seconds achieving 7.2 METs. Resting  heart rate 100 beats per minute, fidencio to 176 beats per minute. The patient achieved 100% of the maximum age-predicted heart rate. Resting blood pressure 148/70 mmHg, fidencio to 179/87 mmHg. Electrocardiogram at rest revealed normal sinus rhythm with T-wave  abnormality in the inferolateral lead. With exercise electrocardiogram  showed significant ST depressions of 3 mm that started 2 minutes into  exercise and persisted for 8 minutes into recovery. These ST  depressions of 3 mm were more significant in the lateral wall. There  were random PVCs. EKG returned to baseline after 10 minutes into recovery. The patient stopped due to fatigue and shortness of breath and did not  have exercise-induced chest pain. IMPRESSION:  1. Significantly abnormal exercise treadmill test.  There was evidence  of at least 3 mm of ST depression in the lateral leads first noted 2  minutes into exercise, which persisted up until 8 minutes into recovery. 2.  The patient remained symptomatic. 3.  Average exercise capacity for age and gender. 4.  Normal blood pressure response to exercise. 5.  Recommend coronary angiogram to delineate coronary anatomy.   Dr. Garth Louis, the patient's own cardiologist notified of the above findings.         Stacia Rosales MD    D: 07/27/2022 #19:18:09       T: 07/27/2022 20:47:28     [email or web address]  Job#: [id number]     Doc#: [id number]    CC:

## 2022-07-29 ENCOUNTER — PREP FOR PROCEDURE (OUTPATIENT)
Dept: CARDIOLOGY CLINIC | Age: 47
End: 2022-07-29

## 2022-07-29 ENCOUNTER — OFFICE VISIT (OUTPATIENT)
Dept: CARDIOLOGY CLINIC | Age: 47
End: 2022-07-29
Payer: COMMERCIAL

## 2022-07-29 VITALS
OXYGEN SATURATION: 99 % | WEIGHT: 220 LBS | SYSTOLIC BLOOD PRESSURE: 132 MMHG | BODY MASS INDEX: 29.8 KG/M2 | HEIGHT: 72 IN | DIASTOLIC BLOOD PRESSURE: 80 MMHG | HEART RATE: 100 BPM

## 2022-07-29 DIAGNOSIS — R00.0 TACHYCARDIA: Primary | ICD-10-CM

## 2022-07-29 DIAGNOSIS — I48.91 NEW ONSET ATRIAL FIBRILLATION (HCC): ICD-10-CM

## 2022-07-29 DIAGNOSIS — R06.09 DOE (DYSPNEA ON EXERTION): ICD-10-CM

## 2022-07-29 DIAGNOSIS — R94.39 ABNORMAL STRESS TEST: ICD-10-CM

## 2022-07-29 LAB
ANION GAP SERPL CALCULATED.3IONS-SCNC: 13 MEQ/L (ref 9–15)
BUN BLDV-MCNC: 19 MG/DL (ref 6–20)
CALCIUM SERPL-MCNC: 10.1 MG/DL (ref 8.5–9.9)
CHLORIDE BLD-SCNC: 99 MEQ/L (ref 95–107)
CO2: 25 MEQ/L (ref 20–31)
CREAT SERPL-MCNC: 1.17 MG/DL (ref 0.7–1.2)
GFR AFRICAN AMERICAN: >60
GFR NON-AFRICAN AMERICAN: >60
GLUCOSE BLD-MCNC: 111 MG/DL (ref 70–99)
HCT VFR BLD CALC: 42 % (ref 42–52)
HEMOGLOBIN: 14.3 G/DL (ref 14–18)
MCH RBC QN AUTO: 27.1 PG (ref 27–31.3)
MCHC RBC AUTO-ENTMCNC: 34 % (ref 33–37)
MCV RBC AUTO: 79.7 FL (ref 80–100)
PDW BLD-RTO: 14.3 % (ref 11.5–14.5)
PLATELET # BLD: 257 K/UL (ref 130–400)
POTASSIUM SERPL-SCNC: 4.1 MEQ/L (ref 3.4–4.9)
RBC # BLD: 5.27 M/UL (ref 4.7–6.1)
SODIUM BLD-SCNC: 137 MEQ/L (ref 135–144)
WBC # BLD: 9.3 K/UL (ref 4.8–10.8)

## 2022-07-29 PROCEDURE — 99215 OFFICE O/P EST HI 40 MIN: CPT | Performed by: INTERNAL MEDICINE

## 2022-07-29 PROCEDURE — G8417 CALC BMI ABV UP PARAM F/U: HCPCS | Performed by: INTERNAL MEDICINE

## 2022-07-29 PROCEDURE — G8427 DOCREV CUR MEDS BY ELIG CLIN: HCPCS | Performed by: INTERNAL MEDICINE

## 2022-07-29 PROCEDURE — 1036F TOBACCO NON-USER: CPT | Performed by: INTERNAL MEDICINE

## 2022-07-29 RX ORDER — ONDANSETRON 2 MG/ML
4 INJECTION INTRAMUSCULAR; INTRAVENOUS EVERY 6 HOURS PRN
Status: CANCELLED | OUTPATIENT
Start: 2022-07-29

## 2022-07-29 RX ORDER — SODIUM CHLORIDE 0.9 % (FLUSH) 0.9 %
5-40 SYRINGE (ML) INJECTION EVERY 12 HOURS SCHEDULED
Status: CANCELLED | OUTPATIENT
Start: 2022-07-29

## 2022-07-29 RX ORDER — DIPHENHYDRAMINE HYDROCHLORIDE 50 MG/ML
50 INJECTION INTRAMUSCULAR; INTRAVENOUS ONCE
Status: CANCELLED | OUTPATIENT
Start: 2022-07-29 | End: 2022-07-29

## 2022-07-29 RX ORDER — SODIUM CHLORIDE 0.9 % (FLUSH) 0.9 %
5-40 SYRINGE (ML) INJECTION PRN
Status: CANCELLED | OUTPATIENT
Start: 2022-07-29

## 2022-07-29 RX ORDER — ASPIRIN 81 MG/1
81 TABLET ORAL DAILY
Qty: 90 TABLET | Refills: 1 | Status: ON HOLD
Start: 2022-07-29 | End: 2022-10-31 | Stop reason: HOSPADM

## 2022-07-29 RX ORDER — SODIUM CHLORIDE 9 MG/ML
INJECTION, SOLUTION INTRAVENOUS PRN
Status: CANCELLED | OUTPATIENT
Start: 2022-07-29

## 2022-07-29 RX ORDER — NITROGLYCERIN 0.4 MG/1
0.4 TABLET SUBLINGUAL EVERY 5 MIN PRN
Status: CANCELLED | OUTPATIENT
Start: 2022-07-29

## 2022-07-29 RX ORDER — SODIUM CHLORIDE 450 MG/100ML
75 INJECTION, SOLUTION INTRAVENOUS CONTINUOUS
Status: CANCELLED | OUTPATIENT
Start: 2022-07-29

## 2022-07-29 ASSESSMENT — ENCOUNTER SYMPTOMS
COUGH: 0
BLOOD IN STOOL: 0
GASTROINTESTINAL NEGATIVE: 1
WHEEZING: 0
CHEST TIGHTNESS: 0
SHORTNESS OF BREATH: 1
STRIDOR: 0
EYES NEGATIVE: 1
NAUSEA: 0

## 2022-07-29 NOTE — PROGRESS NOTES
OFFICE VISIT        Patient: Edwardo Esquivel  YOB: 1975  MRN: [id number]    Chief Complaint: 1 Emlenton Hatley  Chief Complaint   Patient presents with    Follow-up    Results    Tachycardia       CV Data:   Echo EF 60 RVSP 51    GXT abn LAt ST changes.  EM AF PSVT    Subjective/HPI 22 Ruptured appendiz and had prolonged hosp. Had long term iv ABX. Since this event he has noted Tachycardia and KNIGHT. Prior to this he was active  Tachy can occur at rest with no exertion. Echo done in 2022 was suboptimal as pt was so tachy. Will need repeat    22 pt can not sense AF. ABN GXT but no symptms during stres. s he is doing ok. Has not been taking BB nor Xarelto  EKG: ST     Work - sheet metal  Lives alone   Foormersmoker  No etoh  No FH    Past Medical History:   Diagnosis Date    Murmur        Past Surgical History:   Procedure Laterality Date    IR MIDLINE CATH  2022    IR MIDLINE CATH 2022 MLOZ SPECIAL PROCEDURE    LAPAROSCOPIC APPENDECTOMY N/A 2022    LAPAROSCOPIC APPENDECTOMY, PATIENT COMING FROM [place] performed by Zenaida Alvarez MD at [facility]       No family history on file.     Social History     Socioeconomic History    Marital status: Single     Spouse name: None    Number of children: None    Years of education: None    Highest education level: None   Tobacco Use    Smoking status: Former     Packs/day: 0.50     Types: Cigarettes     Quit date: 2022     Years since quittin.5    Smokeless tobacco: Never    Tobacco comments:     quit 2022   Vaping Use    Vaping Use: Never used   Substance and Sexual Activity    Alcohol use: Not Currently    Drug use: Never     Social Determinants of Health     Financial Resource Strain: Low Risk     Difficulty of Paying Living Expenses: Not hard at all   Food Insecurity: No Food Insecurity    Worried About Running Out of Food in the Last Year: Never true    Ran Out of Food in the Last Year: Never true       No Known Allergies    Current Outpatient Medications   Medication Sig Dispense Refill    aspirin EC 81 MG EC tablet Take 1 tablet by mouth in the morning. 90 tablet 1    rivaroxaban (XARELTO) 20 MG TABS tablet Take 1 tablet by mouth daily (Patient not taking: No sig reported) 30 tablet 5    metoprolol succinate (TOPROL XL) 50 MG extended release tablet Take 1 tablet by mouth daily (Patient not taking: No sig reported) 30 tablet 5    omeprazole (PRILOSEC) 10 MG delayed release capsule Take 10 mg by mouth as needed Pt states he only takes it maybe once a week unsure of dose        No current facility-administered medications for this visit. Review of Systems:   Review of Systems   Constitutional: Negative. Negative for diaphoresis and fatigue. HENT: Negative. Eyes: Negative. Respiratory:  Positive for shortness of breath. Negative for cough, chest tightness, wheezing and stridor. Cardiovascular:  Positive for palpitations. Negative for chest pain and leg swelling. Gastrointestinal: Negative. Negative for blood in stool and nausea. Genitourinary: Negative. Musculoskeletal: Negative. Skin: Negative. Neurological: Negative. Negative for dizziness, syncope, weakness and light-headedness. Hematological: Negative. Psychiatric/Behavioral: Negative. Physical Examination:    /80 (Site: Right Upper Arm, Position: Sitting, Cuff Size: Large Adult)   Pulse 100   Ht 6' (1.829 m)   Wt 220 lb (99.8 kg)   SpO2 99%   BMI 29.84 kg/m²    Physical Exam   Constitutional: He appears healthy. No distress. HENT:   Normal cephalic and Atraumatic   Eyes: Pupils are equal, round, and reactive to light. Neck: Thyroid normal. No JVD present. No neck adenopathy. No thyromegaly present. Cardiovascular: Normal rate, regular rhythm, intact distal pulses and normal pulses. Murmur heard. Pulmonary/Chest: Effort normal and breath sounds normal. He has no wheezes. He has no rales.  He exhibits no tenderness. Abdominal: Soft. Bowel sounds are normal. There is no abdominal tenderness. Musculoskeletal:         General: No tenderness or edema. Normal range of motion. Cervical back: Normal range of motion and neck supple. Neurological: He is alert and oriented to person, place, and time. Skin: Skin is warm. No cyanosis. Nails show no clubbing.      LABS:  CBC:   Lab Results   Component Value Date/Time    WBC 7.8 05/26/2022 06:15 PM    RBC 5.20 05/26/2022 06:15 PM    HGB 14.0 05/26/2022 06:15 PM    HCT 42.4 05/26/2022 06:15 PM    MCV 81.5 05/26/2022 06:15 PM    MCH 26.9 05/26/2022 06:15 PM    MCHC 33.0 05/26/2022 06:15 PM    RDW 12.8 05/26/2022 06:15 PM     05/26/2022 06:15 PM     Lipids:No results found for: CHOL  No results found for: TRIG  Lab Results   Component Value Date    HDL 31 (L) 04/25/2022     Lab Results   Component Value Date    LDLCALC 125 04/25/2022     No results found for: LABVLDL, VLDL  No results found for: CHOLHDLRATIO  CMP:    Lab Results   Component Value Date/Time     05/26/2022 06:15 PM    K 4.0 05/26/2022 06:15 PM     05/26/2022 06:15 PM    CO2 25 05/26/2022 06:15 PM    BUN 21 05/26/2022 06:15 PM    CREATININE 0.98 05/26/2022 06:15 PM    GFRAA >60.0 05/26/2022 06:15 PM    LABGLOM >60.0 05/26/2022 06:15 PM    GLUCOSE 100 05/26/2022 06:15 PM    PROT 6.9 04/25/2022 07:28 AM    LABALBU 4.1 04/25/2022 07:28 AM    CALCIUM 9.4 05/26/2022 06:15 PM    BILITOT 0.6 04/25/2022 07:28 AM    ALKPHOS 96 04/25/2022 07:28 AM    AST 19 04/25/2022 07:28 AM    ALT 29 04/25/2022 07:28 AM     BMP:    Lab Results   Component Value Date/Time     05/26/2022 06:15 PM    K 4.0 05/26/2022 06:15 PM     05/26/2022 06:15 PM    CO2 25 05/26/2022 06:15 PM    BUN 21 05/26/2022 06:15 PM    LABALBU 4.1 04/25/2022 07:28 AM    CREATININE 0.98 05/26/2022 06:15 PM    CALCIUM 9.4 05/26/2022 06:15 PM    GFRAA >60.0 05/26/2022 06:15 PM    LABGLOM >60.0 05/26/2022 06:15 PM    GLUCOSE 100 05/26/2022 06:15 PM     Magnesium:    Lab Results   Component Value Date/Time    MG 2.0 04/25/2022 07:28 AM     TSH:  Lab Results   Component Value Date    TSH 2.030 04/25/2022             Patient Active Problem List   Diagnosis    Appendicitis with perforation    Acute appendicitis with perforation, localized peritonitis, and abscess, without gangrene    Newly recognized heart murmur       There are no discontinued medications. Modified Medications    No medications on file       Orders Placed This Encounter   Medications    aspirin EC 81 MG EC tablet     Sig: Take 1 tablet by mouth in the morning. Dispense:  90 tablet     Refill:  1       Assessment/Plan:    1. Tachycardia   AF- start Xarelto asap. Resume BB    2. KNIGHT (dyspnea on exertion)     -abn GXT RBA LHC/PCI discussed-  hold Xarelto 2 days prior. Start asa now    Counseling:  Heart Healthy Lifestyle, Low Salt Diet, Take Precautions to Prevent Falls and Walk Daily    Return for Schedule Cath.     Electronically signed by Nathan Banuelos MD on 7/29/2022 at 2:43 PM

## 2022-08-03 ENCOUNTER — OFFICE VISIT (OUTPATIENT)
Dept: FAMILY MEDICINE CLINIC | Age: 47
End: 2022-08-03
Payer: COMMERCIAL

## 2022-08-03 VITALS
HEART RATE: 88 BPM | OXYGEN SATURATION: 99 % | TEMPERATURE: 97.8 F | WEIGHT: 227 LBS | DIASTOLIC BLOOD PRESSURE: 62 MMHG | BODY MASS INDEX: 30.79 KG/M2 | SYSTOLIC BLOOD PRESSURE: 100 MMHG

## 2022-08-03 DIAGNOSIS — I48.0 PAROXYSMAL ATRIAL FIBRILLATION (HCC): Primary | ICD-10-CM

## 2022-08-03 DIAGNOSIS — R73.9 HYPERGLYCEMIA: ICD-10-CM

## 2022-08-03 DIAGNOSIS — R06.02 SHORTNESS OF BREATH: ICD-10-CM

## 2022-08-03 DIAGNOSIS — R73.03 PREDIABETES: ICD-10-CM

## 2022-08-03 LAB — HBA1C MFR BLD: 5.8 %

## 2022-08-03 PROCEDURE — 83036 HEMOGLOBIN GLYCOSYLATED A1C: CPT | Performed by: FAMILY MEDICINE

## 2022-08-03 PROCEDURE — 99214 OFFICE O/P EST MOD 30 MIN: CPT | Performed by: FAMILY MEDICINE

## 2022-08-03 PROCEDURE — G8417 CALC BMI ABV UP PARAM F/U: HCPCS | Performed by: FAMILY MEDICINE

## 2022-08-03 PROCEDURE — G8427 DOCREV CUR MEDS BY ELIG CLIN: HCPCS | Performed by: FAMILY MEDICINE

## 2022-08-03 PROCEDURE — 1036F TOBACCO NON-USER: CPT | Performed by: FAMILY MEDICINE

## 2022-08-03 ASSESSMENT — ENCOUNTER SYMPTOMS
ABDOMINAL PAIN: 0
BLOOD IN STOOL: 0
CONSTIPATION: 0
VOMITING: 0
NAUSEA: 0
APNEA: 0
SHORTNESS OF BREATH: 0
DIARRHEA: 0
COUGH: 0
CHEST TIGHTNESS: 0

## 2022-08-03 NOTE — PROGRESS NOTES
Subjective:      Patient ID: Gil Gary is a 40 y.o. male who presents today for:     Chief Complaint   Patient presents with    Follow-up     Pt has seen cardiology, follow up from echo        HPI  Patient is a very pleasant 44-year-old male presents today to follow-up. He has a recent echocardiogram which showed normal ejection fraction pulmonary hypertension. Recent stress test was abnormal and showed ST depression. He is scheduled for an upcoming catheterization. He states that this weekend he had an episode of significant lightheadedness and went to the emergency room. Lab tests and EKG showed no significant abnormality. He began taking his medication prescribed by cardiology 4 days ago and has been tolerating it well. He states that he noticed an immediate improvement with his breathing after starting this medication. He denies any chest discomfort, shortness of breath or lower extremity edema  Upon review of patient's most recent lab work he has been noted to have elevated blood sugars. He denies any polyuria or polydipsia. He feels well today and denies any complaints  Past Medical History:   Diagnosis Date    Murmur      Past Surgical History:   Procedure Laterality Date    IR MIDLINE CATH  2022    IR MIDLINE CATH 2022 MLOZ SPECIAL PROCEDURE    LAPAROSCOPIC APPENDECTOMY N/A 2022    LAPAROSCOPIC APPENDECTOMY, PATIENT COMING FROM [place] performed by Rupesh French MD at [facility]     No family history on file.   Social History     Socioeconomic History    Marital status: Single     Spouse name: Not on file    Number of children: Not on file    Years of education: Not on file    Highest education level: Not on file   Occupational History    Not on file   Tobacco Use    Smoking status: Former     Packs/day: 0.50     Types: Cigarettes     Quit date: 2022     Years since quittin.5    Smokeless tobacco: Never    Tobacco comments:     quit 2022   Vaping Use    Vaping Use: Never used   Substance and Sexual Activity    Alcohol use: Not Currently    Drug use: Never    Sexual activity: Not on file   Other Topics Concern    Not on file   Social History Narrative    Not on file     Social Determinants of Health     Financial Resource Strain: Low Risk     Difficulty of Paying Living Expenses: Not hard at all   Food Insecurity: No Food Insecurity    Worried About Running Out of Food in the Last Year: Never true    Ran Out of Food in the Last Year: Never true   Transportation Needs: Not on file   Physical Activity: Not on file   Stress: Not on file   Social Connections: Not on file   Intimate Partner Violence: Not on file   Housing Stability: Not on file     Current Outpatient Medications on File Prior to Visit   Medication Sig Dispense Refill    aspirin EC 81 MG EC tablet Take 1 tablet by mouth in the morning. 90 tablet 1    rivaroxaban (XARELTO) 20 MG TABS tablet Take 1 tablet by mouth daily 30 tablet 5    metoprolol succinate (TOPROL XL) 50 MG extended release tablet Take 1 tablet by mouth daily 30 tablet 5    omeprazole (PRILOSEC) 10 MG delayed release capsule Take 10 mg by mouth as needed Pt states he only takes it maybe once a week unsure of dose        No current facility-administered medications on file prior to visit. Allergies:  Patient has no known allergies. Review of Systems   Constitutional:  Negative for activity change, appetite change and fatigue. Respiratory:  Negative for apnea, cough, chest tightness and shortness of breath. Cardiovascular:  Negative for chest pain, palpitations and leg swelling. Gastrointestinal:  Negative for abdominal pain, blood in stool, constipation, diarrhea, nausea and vomiting. Musculoskeletal:  Negative for arthralgias. Neurological:  Negative for seizures and headaches. Psychiatric/Behavioral:  Negative for hallucinations and suicidal ideas.       Objective:   /62   Pulse 88   Temp 97.8 °F (36.6 °C) (Infrared)   Wt changes. He will stop sweet tea and will avoid concentrated sweets and simple carbohydrates    Return if symptoms worsen or fail to improve.   Based on results    Grace Shah MD

## 2022-08-24 ENCOUNTER — HOSPITAL ENCOUNTER (OUTPATIENT)
Dept: CARDIAC CATH/INVASIVE PROCEDURES | Age: 47
Discharge: HOME | End: 2022-08-24
Attending: INTERNAL MEDICINE | Admitting: INTERNAL MEDICINE
Payer: COMMERCIAL

## 2022-08-24 VITALS
TEMPERATURE: 97.1 F | RESPIRATION RATE: 18 BRPM | SYSTOLIC BLOOD PRESSURE: 118 MMHG | DIASTOLIC BLOOD PRESSURE: 69 MMHG | WEIGHT: 218 LBS | BODY MASS INDEX: 29.57 KG/M2 | HEART RATE: 65 BPM | OXYGEN SATURATION: 100 %

## 2022-08-24 PROBLEM — R94.39 ABNORMAL STRESS TEST: Status: ACTIVE | Noted: 2022-08-24

## 2022-08-24 PROCEDURE — C1894 INTRO/SHEATH, NON-LASER: HCPCS

## 2022-08-24 PROCEDURE — 93005 ELECTROCARDIOGRAM TRACING: CPT | Performed by: INTERNAL MEDICINE

## 2022-08-24 PROCEDURE — 2580000003 HC RX 258: Performed by: INTERNAL MEDICINE

## 2022-08-24 PROCEDURE — 99153 MOD SED SAME PHYS/QHP EA: CPT

## 2022-08-24 PROCEDURE — 2709999900 HC NON-CHARGEABLE SUPPLY

## 2022-08-24 PROCEDURE — 93458 L HRT ARTERY/VENTRICLE ANGIO: CPT

## 2022-08-24 PROCEDURE — 93458 L HRT ARTERY/VENTRICLE ANGIO: CPT | Performed by: INTERNAL MEDICINE

## 2022-08-24 PROCEDURE — 6360000004 HC RX CONTRAST MEDICATION: Performed by: INTERNAL MEDICINE

## 2022-08-24 PROCEDURE — 6360000002 HC RX W HCPCS

## 2022-08-24 PROCEDURE — 92978 ENDOLUMINL IVUS OCT C 1ST: CPT

## 2022-08-24 PROCEDURE — C1887 CATHETER, GUIDING: HCPCS

## 2022-08-24 PROCEDURE — 2500000003 HC RX 250 WO HCPCS

## 2022-08-24 PROCEDURE — 93454 CORONARY ARTERY ANGIO S&I: CPT

## 2022-08-24 PROCEDURE — C1769 GUIDE WIRE: HCPCS

## 2022-08-24 PROCEDURE — 93454 CORONARY ARTERY ANGIO S&I: CPT | Performed by: INTERNAL MEDICINE

## 2022-08-24 PROCEDURE — 92978 ENDOLUMINL IVUS OCT C 1ST: CPT | Performed by: INTERNAL MEDICINE

## 2022-08-24 PROCEDURE — 99152 MOD SED SAME PHYS/QHP 5/>YRS: CPT

## 2022-08-24 PROCEDURE — 92979 ENDOLUMINL IVUS OCT C EA: CPT | Performed by: INTERNAL MEDICINE

## 2022-08-24 PROCEDURE — C1753 CATH, INTRAVAS ULTRASOUND: HCPCS

## 2022-08-24 PROCEDURE — C1760 CLOSURE DEV, VASC: HCPCS

## 2022-08-24 RX ORDER — SODIUM CHLORIDE 0.9 % (FLUSH) 0.9 %
5-40 SYRINGE (ML) INJECTION PRN
Status: DISCONTINUED | OUTPATIENT
Start: 2022-08-24 | End: 2022-08-24 | Stop reason: HOSPADM

## 2022-08-24 RX ORDER — ONDANSETRON 2 MG/ML
4 INJECTION INTRAMUSCULAR; INTRAVENOUS EVERY 6 HOURS PRN
Status: DISCONTINUED | OUTPATIENT
Start: 2022-08-24 | End: 2022-08-24 | Stop reason: HOSPADM

## 2022-08-24 RX ORDER — SODIUM CHLORIDE 0.9 % (FLUSH) 0.9 %
5-40 SYRINGE (ML) INJECTION EVERY 12 HOURS SCHEDULED
Status: DISCONTINUED | OUTPATIENT
Start: 2022-08-24 | End: 2022-08-24 | Stop reason: HOSPADM

## 2022-08-24 RX ORDER — SODIUM CHLORIDE 9 MG/ML
INJECTION, SOLUTION INTRAVENOUS PRN
Status: DISCONTINUED | OUTPATIENT
Start: 2022-08-24 | End: 2022-08-24 | Stop reason: HOSPADM

## 2022-08-24 RX ORDER — DIPHENHYDRAMINE HYDROCHLORIDE 50 MG/ML
50 INJECTION INTRAMUSCULAR; INTRAVENOUS ONCE
Status: DISCONTINUED | OUTPATIENT
Start: 2022-08-24 | End: 2022-08-24 | Stop reason: HOSPADM

## 2022-08-24 RX ORDER — NITROGLYCERIN 0.4 MG/1
0.4 TABLET SUBLINGUAL EVERY 5 MIN PRN
Status: DISCONTINUED | OUTPATIENT
Start: 2022-08-24 | End: 2022-08-24 | Stop reason: HOSPADM

## 2022-08-24 RX ORDER — SODIUM CHLORIDE 450 MG/100ML
INJECTION, SOLUTION INTRAVENOUS CONTINUOUS
Status: DISCONTINUED | OUTPATIENT
Start: 2022-08-24 | End: 2022-08-24 | Stop reason: HOSPADM

## 2022-08-24 RX ORDER — SODIUM CHLORIDE 9 MG/ML
INJECTION, SOLUTION INTRAVENOUS PRN
Status: DISCONTINUED | OUTPATIENT
Start: 2022-08-24 | End: 2022-08-24 | Stop reason: SDUPTHER

## 2022-08-24 RX ORDER — SODIUM CHLORIDE 450 MG/100ML
75 INJECTION, SOLUTION INTRAVENOUS CONTINUOUS
Status: DISCONTINUED | OUTPATIENT
Start: 2022-08-24 | End: 2022-08-24 | Stop reason: HOSPADM

## 2022-08-24 RX ORDER — ACETAMINOPHEN 325 MG/1
650 TABLET ORAL EVERY 4 HOURS PRN
Status: DISCONTINUED | OUTPATIENT
Start: 2022-08-24 | End: 2022-08-24 | Stop reason: HOSPADM

## 2022-08-24 RX ADMIN — SODIUM CHLORIDE 75 ML/HR: 4.5 INJECTION, SOLUTION INTRAVENOUS at 07:30

## 2022-08-24 RX ADMIN — IOPAMIDOL 40 ML: 612 INJECTION, SOLUTION INTRAVENOUS at 14:53

## 2022-08-24 RX ADMIN — IOPAMIDOL 170 ML: 612 INJECTION, SOLUTION INTRAVENOUS at 09:05

## 2022-08-24 NOTE — DISCHARGE INSTRUCTIONS
No driving for 24 hours. No strenuous activity, lifting over 5lb, or excessive bending for 48 hours. If bleeding occurs, lie down and hold pressure. If unable to control or stop bleeding, Call 911 or go to the nearest emergency room. May shower and remove dressing 24 hours after discharge.

## 2022-08-24 NOTE — PROGRESS NOTES
Pt. Arrived to pre/post cath from home. Alert and oriented. Consent signed. Vitals obtained and stable. Pt denies having any pain or distress at this time. IV access obtained. EKG completed. Prepped for procedure and taken to cath lab.  0930 Pt returned from procedure to pre/post cath with puncture sites to R radial and R femoral arteries. Received report from [facility]. Pt is awake and alert. Denies having any pain or complaints. R radial quik clot with outer pressure dressing and R groin dressing clean, dry, and intact with no bleeding or hematoma. Dr Hilda Lopez updating pt.'s partner in waiting room. 1015 Pt is asleep. Respirations are even and unlabored. Vital signs remain stable. Will continue to monitor. 1030 Outer pressure dressing removed from R wrist. Quikclot dressing remains clean, dry, and intact. 1130 No changes in pt.'s status. 1215 Ordered bedrest completed. R groin and R wrist remain stable. Pt.'s partner Bauer Ty at bedside. 1445 Pt returned from cath lab, IVUS procedure. Awake and alert. Received report from Oliverio Serrano. L groin [place] in place. Site remains stable without bleeding or hematoma. Dressing is clean, dry, and intact. Pt denies having any complaints or pain at this time. Will monitor. 1640 Dr. Lennie Renee updating pt and pt.'s partner Bauer Ty in waiting room. Discharge instructions given to the patient with a demonstration of understanding.

## 2022-08-25 LAB
EKG ATRIAL RATE: 61 BPM
EKG P AXIS: 46 DEGREES
EKG P-R INTERVAL: 138 MS
EKG Q-T INTERVAL: 420 MS
EKG QRS DURATION: 92 MS
EKG QTC CALCULATION (BAZETT): 422 MS
EKG R AXIS: 77 DEGREES
EKG T AXIS: -34 DEGREES
EKG VENTRICULAR RATE: 61 BPM

## 2022-09-14 ENCOUNTER — OFFICE VISIT (OUTPATIENT)
Dept: FAMILY MEDICINE CLINIC | Age: 47
End: 2022-09-14
Payer: COMMERCIAL

## 2022-09-14 VITALS
BODY MASS INDEX: 29.65 KG/M2 | TEMPERATURE: 97.7 F | SYSTOLIC BLOOD PRESSURE: 110 MMHG | OXYGEN SATURATION: 99 % | WEIGHT: 218.6 LBS | DIASTOLIC BLOOD PRESSURE: 70 MMHG | HEART RATE: 77 BPM

## 2022-09-14 DIAGNOSIS — R73.03 PREDIABETES: ICD-10-CM

## 2022-09-14 DIAGNOSIS — R53.83 FATIGUE, UNSPECIFIED TYPE: ICD-10-CM

## 2022-09-14 DIAGNOSIS — R01.1 HEART MURMUR: ICD-10-CM

## 2022-09-14 DIAGNOSIS — I48.0 PAROXYSMAL ATRIAL FIBRILLATION (HCC): Primary | ICD-10-CM

## 2022-09-14 PROCEDURE — 99213 OFFICE O/P EST LOW 20 MIN: CPT | Performed by: FAMILY MEDICINE

## 2022-09-14 PROCEDURE — G8417 CALC BMI ABV UP PARAM F/U: HCPCS | Performed by: FAMILY MEDICINE

## 2022-09-14 PROCEDURE — 1036F TOBACCO NON-USER: CPT | Performed by: FAMILY MEDICINE

## 2022-09-14 PROCEDURE — G8427 DOCREV CUR MEDS BY ELIG CLIN: HCPCS | Performed by: FAMILY MEDICINE

## 2022-09-14 NOTE — PROGRESS NOTES
Subjective:      Patient ID: Celi Greenfield is a 40 y.o. male who presents today for:     Chief Complaint   Patient presents with    Discuss Medications     Discuss metoprolol, tingling fingers/toes, lightheadedness, nausea, upset stomach, panic attacks, states he was doing well with 1/2 of the dose, as increased sx started. Other     Wheezing x4-5 days        HPI  Patient is a very pleasant 60-year-old male presents today to follow-up. He has had a catheterization without any significant blockage. He is doing well today, however, he was experiencing lightheadedness, nausea and increased panic attacks with the higher dose of metoprolol. He states his symptoms have improved since reducing the dose of metoprolol. He denies any chest pain, shortness of breath or lower extremity edema. He has an upcoming follow-up with cardiology to discuss all results  Past Medical History:   Diagnosis Date    Murmur      Past Surgical History:   Procedure Laterality Date    IR MIDLINE CATH  2022    IR MIDLINE CATH 2022 MLOZ SPECIAL PROCEDURE    LAPAROSCOPIC APPENDECTOMY N/A 2022    LAPAROSCOPIC APPENDECTOMY, PATIENT COMING FROM [place] performed by Ofelia Baker MD at [facility]     History reviewed. No pertinent family history.   Social History     Socioeconomic History    Marital status: Single     Spouse name: Not on file    Number of children: Not on file    Years of education: Not on file    Highest education level: Not on file   Occupational History    Not on file   Tobacco Use    Smoking status: Former     Packs/day: 0.50     Types: Cigarettes     Quit date: 2022     Years since quittin.6    Smokeless tobacco: Never    Tobacco comments:     quit 2022   Vaping Use    Vaping Use: Never used   Substance and Sexual Activity    Alcohol use: Not Currently    Drug use: Never    Sexual activity: Not on file   Other Topics Concern    Not on file   Social History Narrative    Not on file     Social Determinants of Health     Financial Resource Strain: Low Risk     Difficulty of Paying Living Expenses: Not hard at all   Food Insecurity: No Food Insecurity    Worried About Running Out of Food in the Last Year: Never true    Ran Out of Food in the Last Year: Never true   Transportation Needs: Not on file   Physical Activity: Not on file   Stress: Not on file   Social Connections: Not on file   Intimate Partner Violence: Not on file   Housing Stability: Not on file     Current Outpatient Medications on File Prior to Visit   Medication Sig Dispense Refill    aspirin EC 81 MG EC tablet Take 1 tablet by mouth in the morning. 90 tablet 1    metoprolol succinate (TOPROL XL) 50 MG extended release tablet Take 1 tablet by mouth daily (Patient taking differently: Take 50 mg by mouth daily Taking 1/2 dose) 30 tablet 5    omeprazole (PRILOSEC) 10 MG delayed release capsule Take 10 mg by mouth as needed Pt states he only takes it maybe once a week unsure of dose       rivaroxaban (XARELTO) 20 MG TABS tablet Take 1 tablet by mouth daily (Patient not taking: Reported on 9/14/2022) 30 tablet 5     No current facility-administered medications on file prior to visit. Allergies:  Patient has no known allergies. Review of Systems   Constitutional:  Negative for activity change, appetite change and fatigue. Respiratory:  Negative for apnea, cough, chest tightness and shortness of breath. Cardiovascular:  Negative for chest pain, palpitations and leg swelling. Gastrointestinal:  Negative for abdominal pain, blood in stool, constipation, diarrhea, nausea and vomiting. Musculoskeletal:  Negative for arthralgias. Neurological:  Negative for seizures and headaches. Psychiatric/Behavioral:  Negative for hallucinations and suicidal ideas.       Objective:   /70   Pulse 77   Temp 97.7 °F (36.5 °C) (Infrared)   Wt 218 lb 9.6 oz (99.2 kg)   SpO2 99%   BMI 29.65 kg/m²     Physical Exam  Vitals and nursing note reviewed. Constitutional:       General: He is not in acute distress. Appearance: Normal appearance. He is well-developed. He is not diaphoretic. HENT:      Head: Normocephalic and atraumatic. Nose: Nose normal.      Mouth/Throat:      Mouth: Mucous membranes are moist.      Pharynx: Oropharynx is clear. Eyes:      Conjunctiva/sclera: Conjunctivae normal.      Pupils: Pupils are equal, round, and reactive to light. Cardiovascular:      Rate and Rhythm: Normal rate and regular rhythm. Pulses: Normal pulses. Heart sounds: Murmur heard. No friction rub. No gallop. Pulmonary:      Effort: Pulmonary effort is normal. No respiratory distress. Breath sounds: Normal breath sounds. No wheezing or rales. Chest:      Chest wall: No tenderness. Abdominal:      General: Abdomen is flat. Bowel sounds are normal.      Palpations: Abdomen is soft. Tenderness: There is no abdominal tenderness. Musculoskeletal:      Cervical back: Normal range of motion. Skin:     General: Skin is warm and dry. Neurological:      Mental Status: He is alert and oriented to person, place, and time. Psychiatric:         Behavior: Behavior normal.         Thought Content: Thought content normal.         Judgment: Judgment normal.       Assessment & Plan:     1. Paroxysmal atrial fibrillation (HCC)  Stable: Follow-up with cardiology    2. Heart murmur  Stable:    3. Prediabetes  Controlled:     4. Fatigue, unspecified type  Unclear etiology. Reviewed past labs. We will check SAAD for further investigation  - SAAD Screen With Reflex; Future    Return if symptoms worsen or fail to improve.     Leonardo Vail MD

## 2022-09-18 ASSESSMENT — ENCOUNTER SYMPTOMS
COUGH: 0
SHORTNESS OF BREATH: 0
CHEST TIGHTNESS: 0
ABDOMINAL PAIN: 0
NAUSEA: 0
BLOOD IN STOOL: 0
DIARRHEA: 0
VOMITING: 0
CONSTIPATION: 0
APNEA: 0

## 2022-09-28 ENCOUNTER — OFFICE VISIT (OUTPATIENT)
Dept: CARDIOLOGY CLINIC | Age: 47
End: 2022-09-28
Payer: COMMERCIAL

## 2022-09-28 VITALS
DIASTOLIC BLOOD PRESSURE: 74 MMHG | WEIGHT: 222.2 LBS | HEIGHT: 72 IN | OXYGEN SATURATION: 97 % | SYSTOLIC BLOOD PRESSURE: 110 MMHG | BODY MASS INDEX: 30.1 KG/M2 | HEART RATE: 82 BPM

## 2022-09-28 DIAGNOSIS — I48.91 NEW ONSET ATRIAL FIBRILLATION (HCC): ICD-10-CM

## 2022-09-28 DIAGNOSIS — I25.10 CORONARY ARTERY DISEASE INVOLVING NATIVE CORONARY ARTERY OF NATIVE HEART WITHOUT ANGINA PECTORIS: ICD-10-CM

## 2022-09-28 DIAGNOSIS — R06.09 DOE (DYSPNEA ON EXERTION): ICD-10-CM

## 2022-09-28 DIAGNOSIS — R00.0 TACHYCARDIA: Primary | ICD-10-CM

## 2022-09-28 PROCEDURE — G8427 DOCREV CUR MEDS BY ELIG CLIN: HCPCS | Performed by: INTERNAL MEDICINE

## 2022-09-28 PROCEDURE — 99214 OFFICE O/P EST MOD 30 MIN: CPT | Performed by: INTERNAL MEDICINE

## 2022-09-28 PROCEDURE — 1036F TOBACCO NON-USER: CPT | Performed by: INTERNAL MEDICINE

## 2022-09-28 PROCEDURE — G8417 CALC BMI ABV UP PARAM F/U: HCPCS | Performed by: INTERNAL MEDICINE

## 2022-09-28 ASSESSMENT — ENCOUNTER SYMPTOMS
STRIDOR: 0
EYES NEGATIVE: 1
NAUSEA: 0
SHORTNESS OF BREATH: 1
COUGH: 0
CHEST TIGHTNESS: 0
BLOOD IN STOOL: 0
GASTROINTESTINAL NEGATIVE: 1
WHEEZING: 0

## 2022-09-28 NOTE — PROGRESS NOTES
OFFICE VISIT        Patient: Easton Blanton  YOB: 1975  MRN: [id number]    Chief Complaint: [address] [place]  Chief Complaint   Patient presents with    Results     Heart catheter        CV Data:   Echo EF 60 RVSP 51    GXT abn LAt ST changes.  EM AF PSVT  LM  Ostial Stenosis. LM tapering PX to mid 50-70%  LAD and CX Normal.  RCA mild slow flow but normal. IVUS of LM LAD and CX done and showed no significant blockages. Subjective/HPI 22 Ruptured appendiz and had prolonged hosp. Had long term iv ABX. Since this event he has noted Tachycardia and KNIGHT. Prior to this he was active  Tachy can occur at rest with no exertion. Echo done in 2022 was suboptimal as pt was so tachy. Will need repeat    22 pt can not sense AF. ABN GXT but no symptms during stres. s he is doing ok. Has not been taking BB nor Xarelto    22 doing well no cp no sob BB dose reduced due to [facility]. Again not taking Xarelto. Thinks it is cuasing him to be tired. EKG: ST     Work - sheet metal  Lives alone   Former smoker  No etoh  No FH    Past Medical History:   Diagnosis Date    Murmur        Past Surgical History:   Procedure Laterality Date    IR MIDLINE CATH  2022    IR MIDLINE CATH 2022 MLOZ SPECIAL PROCEDURE    LAPAROSCOPIC APPENDECTOMY N/A 2022    LAPAROSCOPIC APPENDECTOMY, PATIENT COMING FROM [place] performed by Lissy Torres MD at [facility]       No family history on file.     Social History     Socioeconomic History    Marital status: Single     Spouse name: None    Number of children: None    Years of education: None    Highest education level: None   Tobacco Use    Smoking status: Former     Packs/day: 0.50     Types: Cigarettes     Quit date: 2022     Years since quittin.7    Smokeless tobacco: Never    Tobacco comments:     quit 2022   Vaping Use    Vaping Use: Never used   Substance and Sexual Activity    Alcohol use: Not Currently    Drug use: Never Social Determinants of Health     Financial Resource Strain: Low Risk     Difficulty of Paying Living Expenses: Not hard at all   Food Insecurity: No Food Insecurity    Worried About [address] in the Last Year: Never true    Ran Out of Food in the Last Year: Never true       No Known Allergies    Current Outpatient Medications   Medication Sig Dispense Refill    rivaroxaban (XARELTO) 20 MG TABS tablet Take 1 tablet by mouth daily 30 tablet 3    aspirin EC 81 MG EC tablet Take 1 tablet by mouth in the morning. 90 tablet 1    metoprolol succinate (TOPROL XL) 50 MG extended release tablet Take 1 tablet by mouth daily (Patient taking differently: Take 50 mg by mouth daily Taking 1/2 dose) 30 tablet 5    omeprazole (PRILOSEC) 10 MG delayed release capsule Take 10 mg by mouth as needed Pt states he only takes it maybe once a week unsure of dose        No current facility-administered medications for this visit. Review of Systems:   Review of Systems   Constitutional: Negative. Negative for diaphoresis and fatigue. HENT: Negative. Eyes: Negative. Respiratory:  Positive for shortness of breath. Negative for cough, chest tightness, wheezing and stridor. Cardiovascular:  Positive for palpitations. Negative for chest pain and leg swelling. Gastrointestinal: Negative. Negative for blood in stool and nausea. Genitourinary: Negative. Musculoskeletal: Negative. Skin: Negative. Neurological: Negative. Negative for dizziness, syncope, weakness and light-headedness. Hematological: Negative. Psychiatric/Behavioral: Negative. Physical Examination:    /74 (Site: Left Upper Arm, Position: Sitting, Cuff Size: Medium Adult)   Pulse 82   Ht 6' (1.829 m)   Wt 222 lb 3.2 oz (100.8 kg)   SpO2 97%   BMI 30.14 kg/m²    Physical Exam   Constitutional: He appears healthy. No distress.    HENT:   Normal cephalic and Atraumatic   Eyes: Pupils are equal, round, and reactive to 07/29/2022 02:57 PM    CL 99 07/29/2022 02:57 PM    CO2 25 07/29/2022 02:57 PM    BUN 19 07/29/2022 02:57 PM    LABALBU 4.1 04/25/2022 07:28 AM    CREATININE 1.17 07/29/2022 02:57 PM    CALCIUM 10.1 07/29/2022 02:57 PM    GFRAA >60.0 07/29/2022 02:57 PM    LABGLOM >60.0 07/29/2022 02:57 PM    GLUCOSE 111 07/29/2022 02:57 PM     Magnesium:    Lab Results   Component Value Date/Time    MG 2.0 04/25/2022 07:28 AM     TSH:  Lab Results   Component Value Date    TSH 2.030 04/25/2022             Patient Active Problem List   Diagnosis    Appendicitis with perforation    Acute appendicitis with perforation, localized peritonitis, and abscess, without gangrene    Newly recognized heart murmur    Abnormal stress test       Medications Discontinued During This Encounter   Medication Reason    rivaroxaban (XARELTO) 20 MG TABS tablet Therapy completed       Modified Medications    Modified Medication Previous Medication    RIVAROXABAN (XARELTO) 20 MG TABS TABLET rivaroxaban (XARELTO) 20 MG TABS tablet       Take 1 tablet by mouth daily    Take 1 tablet by mouth daily       Orders Placed This Encounter   Medications    rivaroxaban (XARELTO) 20 MG TABS tablet     Sig: Take 1 tablet by mouth daily     Dispense:  30 tablet     Refill:  3       Assessment/Plan:    1. Tachycardia   AF- start Xarelto asap. Resume BB    2. KNIGHT (dyspnea on exertion)     Cath - mild CAD ASA BB    3. Pt declining Statin at this time. Recheck and reconsider. Markfits discussed. Counseling:  Heart Healthy Lifestyle, Low Salt Diet, Take Precautions to Prevent Falls and Walk Daily    Return in about 6 months (around 3/28/2023).     Electronically signed by Nathan Banuelos MD on 9/28/2022 at 3:09 PM

## 2022-10-21 ENCOUNTER — TELEPHONE (OUTPATIENT)
Dept: FAMILY MEDICINE CLINIC | Age: 47
End: 2022-10-21

## 2022-10-21 ENCOUNTER — PATIENT MESSAGE (OUTPATIENT)
Dept: FAMILY MEDICINE CLINIC | Age: 47
End: 2022-10-21

## 2022-10-21 DIAGNOSIS — Z12.11 COLON CANCER SCREENING: Primary | ICD-10-CM

## 2022-10-21 NOTE — TELEPHONE ENCOUNTER
From: Junior Collier  To: Dr. Jennifer Strickland: 10/21/2022 10:27 AM EDT  Subject: Heart cath results    So I have been doing pretty good lately until today, I had some lightheaded spells this morning and feeling like I may pass out. My follow up visit with Dr Saloni Ramon was kind of a joke and he spent about 5 minutes going over results. Didnt show me any images or explain what they saw . Offered me Lipitor even though my cholesterol isnt high and then told me to come back in 6 months . So I still have no idea why Im feeling the way I am or whats going on. Unrelated to that they finally are going to schedule a colonoscopy. Are there any other possibilities other than the heart that you can think of ? Parasite? Will the colonoscopy reveal any gut issues? Do I just have to wait for that to figure this out? Quite honestly Im out of money and stressed about the whole thing. My insurance has denied payment for the heart catheter that ended up being 66k . And this is after I waited a month for approval  from the insurance. You are the only one that responds or moves things forward so you   unfortunately get to hear the complaints. [address] [facility],  your doing well.

## 2022-10-21 NOTE — TELEPHONE ENCOUNTER
Pt calling in regards to DX on colonoscopy. Pt states hes having trouble scheduling due to anemia being listed. Please advise/ replace referral if needed.

## 2022-10-30 ENCOUNTER — APPOINTMENT (OUTPATIENT)
Dept: GENERAL RADIOLOGY | Age: 47
End: 2022-10-30
Payer: COMMERCIAL

## 2022-10-30 ENCOUNTER — HOSPITAL ENCOUNTER (OUTPATIENT)
Age: 47
Setting detail: OBSERVATION
Discharge: HOME | End: 2022-10-31
Attending: EMERGENCY MEDICINE | Admitting: INTERNAL MEDICINE
Payer: COMMERCIAL

## 2022-10-30 DIAGNOSIS — I48.91 ATRIAL FIBRILLATION WITH RVR (HCC): Primary | ICD-10-CM

## 2022-10-30 LAB
ALBUMIN SERPL-MCNC: 4.5 G/DL (ref 3.5–4.6)
ALP BLD-CCNC: 116 U/L (ref 35–104)
ALT SERPL-CCNC: 20 U/L (ref 0–41)
AMPHETAMINE SCREEN, URINE: NORMAL
ANION GAP SERPL CALCULATED.3IONS-SCNC: 14 MEQ/L (ref 9–15)
APTT: 29.5 SEC (ref 24.4–36.8)
AST SERPL-CCNC: 14 U/L (ref 0–40)
BARBITURATE SCREEN URINE: NORMAL
BASOPHILS ABSOLUTE: 0.1 K/UL (ref 0–0.1)
BASOPHILS RELATIVE PERCENT: 0.9 % (ref 0.2–1.2)
BENZODIAZEPINE SCREEN, URINE: NORMAL
BILIRUB SERPL-MCNC: 0.5 MG/DL (ref 0.2–0.7)
BILIRUBIN URINE: NEGATIVE
BLOOD, URINE: NEGATIVE
BUN BLDV-MCNC: 30 MG/DL (ref 6–20)
CALCIUM SERPL-MCNC: 9.9 MG/DL (ref 8.5–9.9)
CANNABINOID SCREEN URINE: NORMAL
CHLORIDE BLD-SCNC: 99 MEQ/L (ref 95–107)
CLARITY: CLEAR
CO2: 25 MEQ/L (ref 20–31)
COCAINE METABOLITE SCREEN URINE: NORMAL
COLOR: YELLOW
CREAT SERPL-MCNC: 0.97 MG/DL (ref 0.7–1.2)
D DIMER: 0.34 MG/L FEU (ref 0–0.5)
EOSINOPHILS ABSOLUTE: 0.4 K/UL (ref 0–0.5)
EOSINOPHILS RELATIVE PERCENT: 3.4 % (ref 0.8–7)
ETHANOL PERCENT: NORMAL G/DL
ETHANOL: <10 MG/DL (ref 0–0.08)
GFR SERPL CREATININE-BSD FRML MDRD: >60 ML/MIN/{1.73_M2}
GLOBULIN: 3.1 G/DL (ref 2.3–3.5)
GLUCOSE BLD-MCNC: 124 MG/DL (ref 70–99)
GLUCOSE URINE: NEGATIVE MG/DL
HCT VFR BLD CALC: 46.4 % (ref 42–52)
HEMOGLOBIN: 15.6 G/DL (ref 13.7–17.5)
IMMATURE GRANULOCYTES #: 0 K/UL
IMMATURE GRANULOCYTES %: 0.4 %
INR BLD: 1
KETONES, URINE: NEGATIVE MG/DL
LEUKOCYTE ESTERASE, URINE: NEGATIVE
LIPASE: 55 U/L (ref 12–95)
LYMPHOCYTES ABSOLUTE: 3.7 K/UL (ref 1.3–3.6)
LYMPHOCYTES RELATIVE PERCENT: 33.7 %
Lab: NORMAL
MAGNESIUM: 1.8 MG/DL (ref 1.7–2.4)
MCH RBC QN AUTO: 27.5 PG (ref 25.7–32.2)
MCHC RBC AUTO-ENTMCNC: 33.6 % (ref 32.3–36.5)
MCV RBC AUTO: 81.7 FL (ref 79–92.2)
MONOCYTES ABSOLUTE: 0.9 K/UL (ref 0.3–0.8)
MONOCYTES RELATIVE PERCENT: 8.2 % (ref 5.3–12.2)
NEUTROPHILS ABSOLUTE: 5.9 K/UL (ref 1.8–5.4)
NEUTROPHILS RELATIVE PERCENT: 53.4 % (ref 34–67.9)
NITRITE, URINE: NEGATIVE
OPIATE SCREEN URINE: NORMAL
PDW BLD-RTO: 13 % (ref 11.6–14.4)
PH UA: 7 (ref 5–9)
PHENCYCLIDINE SCREEN URINE: NORMAL
PLATELET # BLD: 294 K/UL (ref 163–337)
POTASSIUM REFLEX MAGNESIUM: 3.5 MEQ/L (ref 3.4–4.9)
PROTEIN UA: NEGATIVE MG/DL
PROTHROMBIN TIME: 13.2 SEC (ref 12.3–14.9)
RBC # BLD: 5.68 M/UL (ref 4.63–6.08)
SODIUM BLD-SCNC: 138 MEQ/L (ref 135–144)
SPECIFIC GRAVITY UA: 1.02 (ref 1–1.03)
TOTAL PROTEIN: 7.6 G/DL (ref 6.3–8)
TROPONIN: <0.01 NG/ML (ref 0–0.01)
URINE REFLEX TO CULTURE: NORMAL
UROBILINOGEN, URINE: 1 E.U./DL
WBC # BLD: 11 K/UL (ref 4.2–9)

## 2022-10-30 PROCEDURE — 83690 ASSAY OF LIPASE: CPT

## 2022-10-30 PROCEDURE — 85025 COMPLETE CBC W/AUTO DIFF WBC: CPT

## 2022-10-30 PROCEDURE — 93005 ELECTROCARDIOGRAM TRACING: CPT

## 2022-10-30 PROCEDURE — 96374 THER/PROPH/DIAG INJ IV PUSH: CPT

## 2022-10-30 PROCEDURE — 82077 ASSAY SPEC XCP UR&BREATH IA: CPT

## 2022-10-30 PROCEDURE — 80306 DRUG TEST PRSMV INSTRMNT: CPT

## 2022-10-30 PROCEDURE — 99285 EMERGENCY DEPT VISIT HI MDM: CPT

## 2022-10-30 PROCEDURE — 6360000002 HC RX W HCPCS: Performed by: EMERGENCY MEDICINE

## 2022-10-30 PROCEDURE — 36415 COLL VENOUS BLD VENIPUNCTURE: CPT

## 2022-10-30 PROCEDURE — 6370000000 HC RX 637 (ALT 250 FOR IP): Performed by: EMERGENCY MEDICINE

## 2022-10-30 PROCEDURE — 96372 THER/PROPH/DIAG INJ SC/IM: CPT

## 2022-10-30 PROCEDURE — 71045 X-RAY EXAM CHEST 1 VIEW: CPT

## 2022-10-30 PROCEDURE — G0378 HOSPITAL OBSERVATION PER HR: HCPCS

## 2022-10-30 PROCEDURE — 96375 TX/PRO/DX INJ NEW DRUG ADDON: CPT

## 2022-10-30 PROCEDURE — 2500000003 HC RX 250 WO HCPCS: Performed by: EMERGENCY MEDICINE

## 2022-10-30 PROCEDURE — 83735 ASSAY OF MAGNESIUM: CPT

## 2022-10-30 PROCEDURE — 85379 FIBRIN DEGRADATION QUANT: CPT

## 2022-10-30 PROCEDURE — 96361 HYDRATE IV INFUSION ADD-ON: CPT

## 2022-10-30 PROCEDURE — 84443 ASSAY THYROID STIM HORMONE: CPT

## 2022-10-30 PROCEDURE — 84484 ASSAY OF TROPONIN QUANT: CPT

## 2022-10-30 PROCEDURE — 2580000003 HC RX 258: Performed by: EMERGENCY MEDICINE

## 2022-10-30 PROCEDURE — 80053 COMPREHEN METABOLIC PANEL: CPT

## 2022-10-30 PROCEDURE — 85730 THROMBOPLASTIN TIME PARTIAL: CPT

## 2022-10-30 PROCEDURE — 81003 URINALYSIS AUTO W/O SCOPE: CPT

## 2022-10-30 PROCEDURE — 85610 PROTHROMBIN TIME: CPT

## 2022-10-30 RX ORDER — 0.9 % SODIUM CHLORIDE 0.9 %
1000 INTRAVENOUS SOLUTION INTRAVENOUS ONCE
Status: COMPLETED | OUTPATIENT
Start: 2022-10-30 | End: 2022-10-30

## 2022-10-30 RX ORDER — SODIUM CHLORIDE 9 MG/ML
INJECTION, SOLUTION INTRAVENOUS PRN
Status: DISCONTINUED | OUTPATIENT
Start: 2022-10-30 | End: 2022-10-31 | Stop reason: HOSPADM

## 2022-10-30 RX ORDER — SODIUM CHLORIDE 0.9 % (FLUSH) 0.9 %
10 SYRINGE (ML) INJECTION PRN
Status: DISCONTINUED | OUTPATIENT
Start: 2022-10-30 | End: 2022-10-31 | Stop reason: HOSPADM

## 2022-10-30 RX ORDER — ACETAMINOPHEN 325 MG/1
650 TABLET ORAL EVERY 6 HOURS PRN
Status: DISCONTINUED | OUTPATIENT
Start: 2022-10-30 | End: 2022-10-31 | Stop reason: HOSPADM

## 2022-10-30 RX ORDER — LABETALOL HYDROCHLORIDE 5 MG/ML
10 INJECTION, SOLUTION INTRAVENOUS ONCE
Status: COMPLETED | OUTPATIENT
Start: 2022-10-30 | End: 2022-10-30

## 2022-10-30 RX ORDER — PANTOPRAZOLE SODIUM 40 MG/1
40 TABLET, DELAYED RELEASE ORAL
Status: DISCONTINUED | OUTPATIENT
Start: 2022-10-31 | End: 2022-10-31 | Stop reason: HOSPADM

## 2022-10-30 RX ORDER — ONDANSETRON 2 MG/ML
4 INJECTION INTRAMUSCULAR; INTRAVENOUS EVERY 6 HOURS PRN
Status: DISCONTINUED | OUTPATIENT
Start: 2022-10-30 | End: 2022-10-31 | Stop reason: HOSPADM

## 2022-10-30 RX ORDER — PROMETHAZINE HYDROCHLORIDE 12.5 MG/1
12.5 TABLET ORAL EVERY 6 HOURS PRN
Status: DISCONTINUED | OUTPATIENT
Start: 2022-10-30 | End: 2022-10-31 | Stop reason: HOSPADM

## 2022-10-30 RX ORDER — SODIUM CHLORIDE 0.9 % (FLUSH) 0.9 %
10 SYRINGE (ML) INJECTION EVERY 12 HOURS SCHEDULED
Status: DISCONTINUED | OUTPATIENT
Start: 2022-10-30 | End: 2022-10-31 | Stop reason: HOSPADM

## 2022-10-30 RX ORDER — POLYETHYLENE GLYCOL 3350 17 G/17G
17 POWDER, FOR SOLUTION ORAL DAILY PRN
Status: DISCONTINUED | OUTPATIENT
Start: 2022-10-30 | End: 2022-10-31 | Stop reason: HOSPADM

## 2022-10-30 RX ORDER — ASPIRIN 81 MG/1
324 TABLET, CHEWABLE ORAL ONCE
Status: COMPLETED | OUTPATIENT
Start: 2022-10-30 | End: 2022-10-30

## 2022-10-30 RX ORDER — DILTIAZEM HYDROCHLORIDE 5 MG/ML
10 INJECTION INTRAVENOUS ONCE
Status: COMPLETED | OUTPATIENT
Start: 2022-10-30 | End: 2022-10-30

## 2022-10-30 RX ORDER — ENOXAPARIN SODIUM 100 MG/ML
40 INJECTION SUBCUTANEOUS DAILY
Status: DISCONTINUED | OUTPATIENT
Start: 2022-10-31 | End: 2022-10-31 | Stop reason: HOSPADM

## 2022-10-30 RX ORDER — METOPROLOL SUCCINATE 50 MG/1
50 TABLET, EXTENDED RELEASE ORAL DAILY
Status: DISCONTINUED | OUTPATIENT
Start: 2022-10-31 | End: 2022-10-31 | Stop reason: HOSPADM

## 2022-10-30 RX ORDER — ENOXAPARIN SODIUM 150 MG/ML
1.5 INJECTION SUBCUTANEOUS ONCE
Status: COMPLETED | OUTPATIENT
Start: 2022-10-30 | End: 2022-10-30

## 2022-10-30 RX ORDER — ASPIRIN 81 MG/1
81 TABLET ORAL DAILY
Status: DISCONTINUED | OUTPATIENT
Start: 2022-10-31 | End: 2022-10-31 | Stop reason: HOSPADM

## 2022-10-30 RX ORDER — ACETAMINOPHEN 650 MG/1
650 SUPPOSITORY RECTAL EVERY 6 HOURS PRN
Status: DISCONTINUED | OUTPATIENT
Start: 2022-10-30 | End: 2022-10-31 | Stop reason: HOSPADM

## 2022-10-30 RX ADMIN — SODIUM CHLORIDE 1000 ML: 9 INJECTION, SOLUTION INTRAVENOUS at 20:58

## 2022-10-30 RX ADMIN — DILTIAZEM HYDROCHLORIDE 10 MG: 5 INJECTION, SOLUTION INTRAVENOUS at 21:37

## 2022-10-30 RX ADMIN — ASPIRIN 81 MG CHEWABLE TABLET 324 MG: 81 TABLET CHEWABLE at 22:41

## 2022-10-30 RX ADMIN — ENOXAPARIN SODIUM 150 MG: 150 INJECTION SUBCUTANEOUS at 21:50

## 2022-10-30 RX ADMIN — LABETALOL HYDROCHLORIDE 10 MG: 5 INJECTION, SOLUTION INTRAVENOUS at 20:58

## 2022-10-30 ASSESSMENT — PAIN SCALES - GENERAL
PAINLEVEL_OUTOF10: 0
PAINLEVEL_OUTOF10: 0

## 2022-10-30 ASSESSMENT — PAIN - FUNCTIONAL ASSESSMENT: PAIN_FUNCTIONAL_ASSESSMENT: 0-10

## 2022-10-30 ASSESSMENT — PAIN DESCRIPTION - LOCATION: LOCATION: CHEST

## 2022-10-31 VITALS
BODY MASS INDEX: 29.8 KG/M2 | HEART RATE: 68 BPM | TEMPERATURE: 97.5 F | RESPIRATION RATE: 19 BRPM | HEIGHT: 72 IN | WEIGHT: 220 LBS | SYSTOLIC BLOOD PRESSURE: 137 MMHG | OXYGEN SATURATION: 100 % | DIASTOLIC BLOOD PRESSURE: 75 MMHG

## 2022-10-31 LAB
ANION GAP SERPL CALCULATED.3IONS-SCNC: 10 MEQ/L (ref 9–15)
BASOPHILS ABSOLUTE: 0.1 K/UL (ref 0–0.1)
BASOPHILS RELATIVE PERCENT: 0.9 % (ref 0.2–1.2)
BUN BLDV-MCNC: 25 MG/DL (ref 6–20)
CALCIUM SERPL-MCNC: 9 MG/DL (ref 8.5–9.9)
CHLORIDE BLD-SCNC: 105 MEQ/L (ref 95–107)
CO2: 25 MEQ/L (ref 20–31)
CREAT SERPL-MCNC: 0.91 MG/DL (ref 0.7–1.2)
EKG ATRIAL RATE: 129 BPM
EKG ATRIAL RATE: 92 BPM
EKG P AXIS: 45 DEGREES
EKG P-R INTERVAL: 152 MS
EKG Q-T INTERVAL: 264 MS
EKG Q-T INTERVAL: 354 MS
EKG QRS DURATION: 88 MS
EKG QRS DURATION: 94 MS
EKG QTC CALCULATION (BAZETT): 407 MS
EKG QTC CALCULATION (BAZETT): 437 MS
EKG R AXIS: 67 DEGREES
EKG R AXIS: 72 DEGREES
EKG T AXIS: -29 DEGREES
EKG T AXIS: -85 DEGREES
EKG VENTRICULAR RATE: 143 BPM
EKG VENTRICULAR RATE: 92 BPM
EOSINOPHILS ABSOLUTE: 0.4 K/UL (ref 0–0.5)
EOSINOPHILS RELATIVE PERCENT: 4.3 % (ref 0.8–7)
GFR SERPL CREATININE-BSD FRML MDRD: >60 ML/MIN/{1.73_M2}
GLUCOSE BLD-MCNC: 102 MG/DL (ref 70–99)
HCT VFR BLD CALC: 42.3 % (ref 42–52)
HEMOGLOBIN: 14.2 G/DL (ref 13.7–17.5)
IMMATURE GRANULOCYTES #: 0 K/UL
IMMATURE GRANULOCYTES %: 0.5 %
LYMPHOCYTES ABSOLUTE: 3.1 K/UL (ref 1.3–3.6)
LYMPHOCYTES RELATIVE PERCENT: 35.4 %
MCH RBC QN AUTO: 27.4 PG (ref 25.7–32.2)
MCHC RBC AUTO-ENTMCNC: 33.6 % (ref 32.3–36.5)
MCV RBC AUTO: 81.5 FL (ref 79–92.2)
MONOCYTES ABSOLUTE: 0.7 K/UL (ref 0.3–0.8)
MONOCYTES RELATIVE PERCENT: 8.1 % (ref 5.3–12.2)
NEUTROPHILS ABSOLUTE: 4.4 K/UL (ref 1.8–5.4)
NEUTROPHILS RELATIVE PERCENT: 50.8 % (ref 34–67.9)
PDW BLD-RTO: 13.1 % (ref 11.6–14.4)
PLATELET # BLD: 260 K/UL (ref 163–337)
POTASSIUM REFLEX MAGNESIUM: 4.1 MEQ/L (ref 3.4–4.9)
RBC # BLD: 5.19 M/UL (ref 4.63–6.08)
SODIUM BLD-SCNC: 140 MEQ/L (ref 135–144)
TSH SERPL DL<=0.05 MIU/L-ACNC: 3.7 UIU/ML (ref 0.44–3.86)
WBC # BLD: 8.7 K/UL (ref 4.2–9)

## 2022-10-31 PROCEDURE — 93010 ELECTROCARDIOGRAM REPORT: CPT | Performed by: INTERNAL MEDICINE

## 2022-10-31 PROCEDURE — 36415 COLL VENOUS BLD VENIPUNCTURE: CPT

## 2022-10-31 PROCEDURE — 80048 BASIC METABOLIC PNL TOTAL CA: CPT

## 2022-10-31 PROCEDURE — 85025 COMPLETE CBC W/AUTO DIFF WBC: CPT

## 2022-10-31 PROCEDURE — G0378 HOSPITAL OBSERVATION PER HR: HCPCS

## 2022-10-31 RX ORDER — METOPROLOL SUCCINATE 25 MG/1
25 TABLET, EXTENDED RELEASE ORAL 2 TIMES DAILY
Qty: 60 TABLET | Refills: 3 | Status: SHIPPED | OUTPATIENT
Start: 2022-10-31

## 2022-10-31 ASSESSMENT — PAIN SCALES - GENERAL
PAINLEVEL_OUTOF10: 0
PAINLEVEL_OUTOF10: 0

## 2022-10-31 NOTE — H&P
Hospital Medicine History & Physical      PCP: Elida Irving MD    Date of Admission: 10/30/2022    Date of Service: 10/31/22      Chief Complaint:  palpitation      History Of Present Illness:  40 y.o. male who presented to [facility] with new onset palpitation yesterday evening. He was diagnosed with A fib earlier this ear, had 2 d echo and cardiac cath performed, prescribed toprol xl and xarelto. Wasn't taking xarelto due to side effect. In ER after injection of labetalol, converted back to SR. Denied fever, dyspnea, CP    Past Medical History:          Diagnosis Date    Murmur        Past Surgical History:          Procedure Laterality Date    IR MIDLINE CATH  1/13/2022    IR MIDLINE CATH 1/13/2022 MLOZ SPECIAL PROCEDURE    LAPAROSCOPIC APPENDECTOMY N/A 1/11/2022    LAPAROSCOPIC APPENDECTOMY, PATIENT COMING FROM [place] performed by Kelton Esteban MD at [facility]       Medications Prior to Admission:      Prior to Admission medications    Medication Sig Start Date End Date Taking? Authorizing Provider   rivaroxaban (XARELTO) 20 MG TABS tablet Take 1 tablet by mouth daily  Patient not taking: Reported on 10/30/2022 9/28/22   Clemente Harrison MD   aspirin EC 81 MG EC tablet Take 1 tablet by mouth in the morning. Patient taking differently: Take 325 mg by mouth nightly 7/29/22   Clemente Harrison MD   metoprolol succinate (TOPROL XL) 50 MG extended release tablet Take 1 tablet by mouth daily  Patient taking differently: Take 50 mg by mouth daily Taking 1/2 dose 5/31/22   Clemente Harrison MD   omeprazole (PRILOSEC) 10 MG delayed release capsule Take 10 mg by mouth as needed Pt states he only takes it maybe once a week unsure of dose     Historical Provider, MD       Allergies:  Patient has no known allergies. Social History:      The patient currently lives home    TOBACCO:   reports that he quit smoking about 9 months ago. His smoking use included cigarettes. He smoked an average of .5 packs per day.  He has never used smokeless tobacco.  ETOH:   reports that he does not currently use alcohol. Family History:       Reviewed in detail and negative for DM, CAD, Cancer, CVA. Positive as follows:    History reviewed. No pertinent family history. REVIEW OF SYSTEMS:   Pertinent positives as noted in the HPI. All other systems reviewed and negative. PHYSICAL EXAM:    /68   Pulse 64   Temp 97.5 °F (36.4 °C) (Oral)   Resp 19   Ht 6' (1.829 m)   Wt 220 lb (99.8 kg)   SpO2 100%   BMI 29.84 kg/m²     General appearance:  No apparent distress, appears stated age and cooperative. HEENT:  Normal cephalic, atraumatic without obvious deformity. Pupils equal, round, and reactive to light. Extra ocular muscles intact. Conjunctivae/corneas clear. Neck: Supple, with full range of motion. No jugular venous distention. Trachea midline. Respiratory:  Normal respiratory effort. Clear to auscultation, bilaterally without Rales/Wheezes/Rhonchi. Cardiovascular:  Regular rate and rhythm with normal S1/S2 without murmurs, rubs or gallops. Abdomen: Soft, non-tender, non-distended with normal bowel sounds. Musculoskeletal:  No clubbing, cyanosis or edema bilaterally. Full range of motion without deformity. Skin: Skin color, texture, turgor normal.  No rashes or lesions. Neurologic:  Neurovascularly intact without any focal sensory/motor deficits.  Cranial nerves: II-XII intact, grossly non-focal.  Psychiatric:  Alert and oriented, thought content appropriate, normal insight  Capillary Refill: Brisk,< 3 seconds   Peripheral Pulses: +2 palpable, equal bilaterally       Labs:     Recent Labs     10/30/22  2101 10/31/22  0610   WBC 11.0* 8.7   HGB 15.6 14.2   HCT 46.4 42.3    260     Recent Labs     10/30/22  2101 10/31/22  0609    140   K 3.5 4.1   CL 99 105   CO2 25 25   BUN 30* 25*   CREATININE 0.97 0.91   CALCIUM 9.9 9.0     Recent Labs     10/30/22  2101   AST 14   ALT 20   BILITOT 0.5   ALKPHOS 116* Recent Labs     10/30/22  2101   INR 1.0     Recent Labs     10/30/22  2101   TROPONINI <0.010       Urinalysis:      Lab Results   Component Value Date/Time    NITRU Negative 10/30/2022 10:55 PM    WBCUA 0-2 01/11/2022 04:45 AM    BACTERIA Negative 01/11/2022 04:45 AM    RBCUA 0-2 01/11/2022 04:45 AM    BLOODU Negative 10/30/2022 10:55 PM    [address] 1.020 10/30/2022 10:55 PM    GLUCOSEU Negative 10/30/2022 10:55 PM       Radiology:     CXR: I have reviewed the CXR with the following interpretation:   EKG:  I have reviewed the EKG with the following interpretation:     XR CHEST PORTABLE   Final Result   No acute process. ASSESSMENT:    Active Hospital Problems    Diagnosis Date Noted    Atrial fibrillation with rapid ventricular response ([facility] [address] [I48.91] 10/30/2022     Priority: Medium       PLAN:        DVT Prophylaxis:   Diet: ADULT DIET; Regular; Low Fat/Low Chol/High Fiber/CARLO  Code Status: Full Code    PT/OT Eval Status:     Dispo - Non sustain Afib- convered to SR- may needs to increase toprol from 25 mg he takes to BID regiment  Advice to restart xarelto at dc  Medically stable for acute admission, will arrange cardiology follow up after Coleman Fierro MD    Thank you Leilani Miner MD for the opportunity to be involved in this patient's care. If you have any questions or concerns please feel free to contact me.

## 2022-10-31 NOTE — ED NOTES
Pt unable to void at this time, aware of need for urine sample and willing to provide sample when able to void.       Radha Bautista RN  10/30/22 [id number]

## 2022-10-31 NOTE — ED NOTES
Shama assigned patient admission bed 208. Registration notified.       Deirdre Farris RN  10/30/22 0290

## 2022-10-31 NOTE — ED PROVIDER NOTES
eMERGENCY dEPARTMENT eNCOUnter      [address] [facility]    Chief Complaint   Patient presents with    Chest Pain       HPI    Brianda Rogers is a 40 y.o. male with history of paroxysmal A. fib, Who presentsto ED from home by private car   With complaint of palpitations  Onset 30 minutes ago  Intensity of symptoms moderate  Patient complains of epigastric chest burning before the palpitations. Patient denies any drug use or shortness of breath. Patient had work-up of dyspnea on exertion- had a cardiac cath by Dr. Alexandra Hua on 8/24/2022 which shows mild coronary disease and was recommended aspirin and beta-blockers. Patient has been non compliant with his medications -   Patient stopped taking his Xarelto and is taking half of the dose of Toprol XL. PAST MEDICAL HISTORY    Past Medical History:   Diagnosis Date    Murmur        SURGICAL HISTORY    Past Surgical History:   Procedure Laterality Date    IR MIDLINE CATH  1/13/2022    IR MIDLINE CATH 1/13/2022 MLOZ SPECIAL PROCEDURE    LAPAROSCOPIC APPENDECTOMY N/A 1/11/2022    LAPAROSCOPIC APPENDECTOMY, PATIENT COMING FROM [place] performed by Ana Paula Romero MD at [facility]    Current Outpatient Rx   Medication Sig Dispense Refill    rivaroxaban (XARELTO) 20 MG TABS tablet Take 1 tablet by mouth daily (Patient not taking: Reported on 10/30/2022) 30 tablet 3    aspirin EC 81 MG EC tablet Take 1 tablet by mouth in the morning. 90 tablet 1    metoprolol succinate (TOPROL XL) 50 MG extended release tablet Take 1 tablet by mouth daily (Patient taking differently: Take 50 mg by mouth daily Taking 1/2 dose) 30 tablet 5    omeprazole (PRILOSEC) 10 MG delayed release capsule Take 10 mg by mouth as needed Pt states he only takes it maybe once a week unsure of dose          ALLERGIES    No Known Allergies    FAMILY HISTORY    History reviewed. No pertinent family history.     SOCIAL HISTORY    Social History     Socioeconomic History    Marital status: Single Spouse name: None    Number of children: None    Years of education: None    Highest education level: None   Tobacco Use    Smoking status: Former     Packs/day: 0.50     Types: Cigarettes     Quit date: 2022     Years since quittin.8    Smokeless tobacco: Never    Tobacco comments:     quit 2022   Vaping Use    Vaping Use: Never used   Substance and Sexual Activity    Alcohol use: Not Currently    Drug use: Never     Social Determinants of Health     Financial Resource Strain: Low Risk     Difficulty of Paying Living Expenses: Not hard at all   Food Insecurity: No Food Insecurity    Worried About Running Out of Food in the Last Year: Never true    Ran Out of Food in the Last Year: Never true       REVIEW OF SYSTEMS    Constitutional:  Denies fever, chills, weight loss or weakness   Eyes:  Denies photophobia or discharge   HENT:  Denies sore throat or ear pain   Respiratory:  Denies cough or shortness of breath   Cardiovascular:  c/o chest pain, palpitations but deneis  swelling   GI:  Denies abdominal pain, nausea, vomiting, or diarrhea   Musculoskeletal:  Denies back pain   Skin:  Denies rash   Neurologic:  Denies headache, focal weakness or sensory changes   Endocrine:  Denies polyuria or polydypsia   Lymphatic:  Denies swollen glands   Psychiatric:  Denies depression, suicidal ideation or homicidal ideation   All systems negative except as marked. PHYSICAL EXAM    VITAL SIGNS: BP (!) 136/93   Pulse 91   Temp 97.5 °F (36.4 °C) (Oral)   Resp 19   Ht 6' (1.829 m)   Wt 220 lb (99.8 kg)   SpO2 96%   BMI 29.84 kg/m²    Constitutional:  Well developed, Well nourished, moderate  acute distress, Non-toxic appearance. HENT:  Normocephalic, Atraumatic, Bilateral external ears normal, Oropharynx moist, No oral exudates, Nose normal. Neck- Normal range of motion, No tenderness, Supple, No stridor. Eyes:  PERRL, EOMI, Conjunctiva normal, No discharge.    Respiratory:  Normal breath sounds, No respiratory distress, No wheezing, No chest tenderness. Cardiovascular:  Tachycardic , irregularly irregular  rhythm, No murmurs, No rubs, No gallops. GI:  Bowel sounds normal, Soft, No tenderness, No masses, No pulsatile masses. :  No CVA tenderness. Musculoskeletal:  Intact distal pulses, No edema, No tenderness, No cyanosis, No clubbing. Good range of motion in all major joints. No tenderness to palpation or major deformities noted. Back- No tenderness. Integument:  Warm, Dry, No erythema, No rash. Lymphatic:  No lymphadenopathy noted. Neurologic:  Alert & oriented x 3, Normal motor function, Normal sensory function, No focal deficits noted. Psychiatric:  Affect normal, Judgment normal, Mood normal.     EKG    Atrial Fibrillation ,  , Normal Axis, Non specific ST- T wave changes, QTc 407   NSR, HR 92 , Normal Axis, inferior ST- T wave changes, QTc 437     RADIOLOGY    XR CHEST PORTABLE   Final Result   No acute process. REEVALUATION   Patient was updated the results of labs and Radiology. HR down to 113   Spoke with Dr Alida Hunt accepted patient admission.    10:33 HR 92  Labs  Labs Reviewed   CBC WITH AUTO DIFFERENTIAL - Abnormal; Notable for the following components:       Result Value    WBC 11.0 (*)     Neutrophils Absolute 5.9 (*)     Lymphocytes Absolute 3.7 (*)     Monocytes Absolute 0.9 (*)     All other components within normal limits   COMPREHENSIVE METABOLIC PANEL W/ REFLEX TO MG FOR LOW K - Abnormal; Notable for the following components:    Glucose 124 (*)     BUN 30 (*)     Alkaline Phosphatase 116 (*)     All other components within normal limits   TROPONIN   PROTIME-INR   APTT   D-DIMER, QUANTITATIVE   LIPASE   ETHANOL   MAGNESIUM   URINE DRUG SCREEN   URINALYSIS WITH REFLEX TO CULTURE   TSH             Summation      Patient Course:     ED Medications administered this visit:    Medications   aspirin chewable tablet 324 mg (has no administration in time range)   0.9 % sodium chloride bolus (0 mLs IntraVENous Stopped 10/30/22 2202)   labetalol (NORMODYNE;TRANDATE) injection 10 mg (10 mg IntraVENous Given 10/30/22 2058)   enoxaparin (LOVENOX) injection 150 mg (150 mg SubCUTAneous Given 10/30/22 2150)   dilTIAZem injection 10 mg (10 mg IntraVENous Given 10/30/22 2137)       New Prescriptions from this visit:    New Prescriptions    No medications on file       Follow-up:  No follow-up provider specified. Final Impression:   1.  Atrial fibrillation with RVR ([facility] [address]               (Please note that portions of this note were completed with a voice recognition program.  Efforts were made to edit the dictations but occasionally words are mis-transcribed.)            Chad Lopez MD  10/30/22 1169       Chad Lopez MD  10/30/22 [address], [place]  10/30/22 9216

## 2022-10-31 NOTE — PROGRESS NOTES
Pt stated he was taking home medications different than what was prescribed stating it \"made me feel different\". Education given on home medication and what it is used for. Pt stated understanding and will try medication again. This nurse educated pt that if pt felt symptoms like before, document findings and resume home medication schedule. Pt stated understanding. Pt stated he will schedule own follow up appointments. Dr. Noriega Buys information given to pt as well as [facility] to get a second opinion. Saline locks removed prior to discharge. Pt left via ambulation with staff, family, and belongings.

## 2022-10-31 NOTE — PLAN OF CARE
Problem: Discharge Planning  Goal: Discharge to home or other facility with appropriate resources  10/31/2022 0445 by Alma Delia May RN  Outcome: Progressing  10/31/2022 0445 by Alma Delia May RN  Outcome: Progressing     Problem: Pain  Goal: Verbalizes/displays adequate comfort level or baseline comfort level  10/31/2022 0445 by Alma Delia May RN  Outcome: Progressing  10/31/2022 0445 by Alma Delia May RN  Outcome: Progressing     Problem: ABCDS Injury Assessment  Goal: Absence of physical injury  10/31/2022 0445 by Alma Delia May RN  Outcome: Progressing  10/31/2022 0445 by Alma Delia May RN  Outcome: Progressing

## 2022-10-31 NOTE — PROGRESS NOTES
Pt a and o x4. Pleasant. Denies chest pain/palpitations/SOB at this time. Pt is NSR on monitor. Cardizem gtt was ordered but was held d/t pt in sinus rhythm. See orders. Admission complete. No skin issues noted. Pt independent in room. Donned non skid fall socks. Has no needs at this time. Call light in reach.

## 2022-10-31 NOTE — ED NOTES
Dr. Sherly Stern called back to speak with Dr. Ramiro Rivero. Dr. Sherly Stern accepted patient for admission. Shama notified of acceptance.       Sonny Mancia RN  10/30/22 [id number]

## 2022-11-12 RX ORDER — DILTIAZEM HYDROCHLORIDE 120 MG/1
120 CAPSULE, COATED, EXTENDED RELEASE ORAL DAILY
Qty: 30 CAPSULE | Refills: 3 | Status: SHIPPED | OUTPATIENT
Start: 2022-11-12

## 2022-11-12 NOTE — TELEPHONE ENCOUNTER
Spent 30 minutes reviewing patient's chart. Spent another 30 minutes in discussion with patient regarding his questions and concerns. At this time he will begin weaning off his metoprolol and we will attempt to replace with diltiazem. This change being made due to reported side effects from metoprolol. Teagan Bustamante is agreeable to remain on Xarelto at this point as he was able to arrange financial assistance making the medication affordable. He will follow up as scheduled in December or he will reach out sooner if needed.

## 2022-12-07 ENCOUNTER — TELEPHONE (OUTPATIENT)
Dept: CARDIOLOGY CLINIC | Age: 47
End: 2022-12-07

## 2022-12-07 NOTE — TELEPHONE ENCOUNTER
MARIA M    Pt's Prior Treatment forms for [facility] was signed and mailed to [facility] [place] [address] into [employer].

## 2022-12-27 ENCOUNTER — ANESTHESIA EVENT (OUTPATIENT)
Dept: OPERATING ROOM | Age: 47
End: 2022-12-27
Payer: COMMERCIAL

## 2022-12-28 ENCOUNTER — ANESTHESIA (OUTPATIENT)
Dept: OPERATING ROOM | Age: 47
End: 2022-12-28
Payer: COMMERCIAL

## 2022-12-28 ENCOUNTER — HOSPITAL ENCOUNTER (OUTPATIENT)
Age: 47
Setting detail: OUTPATIENT SURGERY
Discharge: HOME | End: 2022-12-28
Attending: SPECIALIST | Admitting: SPECIALIST
Payer: COMMERCIAL

## 2022-12-28 VITALS
HEIGHT: 73 IN | TEMPERATURE: 98.7 F | RESPIRATION RATE: 14 BRPM | BODY MASS INDEX: 27.17 KG/M2 | SYSTOLIC BLOOD PRESSURE: 117 MMHG | WEIGHT: 205 LBS | DIASTOLIC BLOOD PRESSURE: 97 MMHG | OXYGEN SATURATION: 96 % | HEART RATE: 97 BPM

## 2022-12-28 DIAGNOSIS — Z12.11 COLON CANCER SCREENING: ICD-10-CM

## 2022-12-28 PROCEDURE — 2580000003 HC RX 258: Performed by: SPECIALIST

## 2022-12-28 PROCEDURE — 3700000001 HC ADD 15 MINUTES (ANESTHESIA): Performed by: SPECIALIST

## 2022-12-28 PROCEDURE — 6370000000 HC RX 637 (ALT 250 FOR IP): Performed by: SPECIALIST

## 2022-12-28 PROCEDURE — 45380 COLONOSCOPY AND BIOPSY: CPT | Performed by: SPECIALIST

## 2022-12-28 PROCEDURE — 2709999900 HC NON-CHARGEABLE SUPPLY: Performed by: SPECIALIST

## 2022-12-28 PROCEDURE — 6360000002 HC RX W HCPCS: Performed by: ANESTHESIOLOGY

## 2022-12-28 PROCEDURE — 3700000000 HC ANESTHESIA ATTENDED CARE: Performed by: SPECIALIST

## 2022-12-28 PROCEDURE — 7100000011 HC PHASE II RECOVERY - ADDTL 15 MIN: Performed by: SPECIALIST

## 2022-12-28 PROCEDURE — 88305 TISSUE EXAM BY PATHOLOGIST: CPT

## 2022-12-28 PROCEDURE — 7100000010 HC PHASE II RECOVERY - FIRST 15 MIN: Performed by: SPECIALIST

## 2022-12-28 PROCEDURE — 3609027000 HC COLONOSCOPY: Performed by: SPECIALIST

## 2022-12-28 RX ORDER — SODIUM CHLORIDE 0.9 % (FLUSH) 0.9 %
5-40 SYRINGE (ML) INJECTION PRN
Status: DISCONTINUED | OUTPATIENT
Start: 2022-12-28 | End: 2022-12-28 | Stop reason: HOSPADM

## 2022-12-28 RX ORDER — MAGNESIUM HYDROXIDE 1200 MG/15ML
LIQUID ORAL PRN
Status: DISCONTINUED | OUTPATIENT
Start: 2022-12-28 | End: 2022-12-28 | Stop reason: ALTCHOICE

## 2022-12-28 RX ORDER — METOCLOPRAMIDE HYDROCHLORIDE 5 MG/ML
10 INJECTION INTRAMUSCULAR; INTRAVENOUS
Status: DISCONTINUED | OUTPATIENT
Start: 2022-12-28 | End: 2022-12-28 | Stop reason: HOSPADM

## 2022-12-28 RX ORDER — HYDROMORPHONE HCL 110MG/55ML
0.5 PATIENT CONTROLLED ANALGESIA SYRINGE INTRAVENOUS EVERY 10 MIN PRN
Status: DISCONTINUED | OUTPATIENT
Start: 2022-12-28 | End: 2022-12-28 | Stop reason: HOSPADM

## 2022-12-28 RX ORDER — FENTANYL CITRATE 50 UG/ML
50 INJECTION, SOLUTION INTRAMUSCULAR; INTRAVENOUS EVERY 10 MIN PRN
Status: DISCONTINUED | OUTPATIENT
Start: 2022-12-28 | End: 2022-12-28 | Stop reason: HOSPADM

## 2022-12-28 RX ORDER — PROPOFOL 10 MG/ML
INJECTION, EMULSION INTRAVENOUS CONTINUOUS PRN
Status: DISCONTINUED | OUTPATIENT
Start: 2022-12-28 | End: 2022-12-28 | Stop reason: SDUPTHER

## 2022-12-28 RX ORDER — ONDANSETRON 2 MG/ML
4 INJECTION INTRAMUSCULAR; INTRAVENOUS
Status: DISCONTINUED | OUTPATIENT
Start: 2022-12-28 | End: 2022-12-28 | Stop reason: HOSPADM

## 2022-12-28 RX ORDER — DIPHENHYDRAMINE HYDROCHLORIDE 50 MG/ML
12.5 INJECTION INTRAMUSCULAR; INTRAVENOUS
Status: DISCONTINUED | OUTPATIENT
Start: 2022-12-28 | End: 2022-12-28 | Stop reason: HOSPADM

## 2022-12-28 RX ORDER — MEPERIDINE HYDROCHLORIDE 50 MG/ML
12.5 INJECTION INTRAMUSCULAR; INTRAVENOUS; SUBCUTANEOUS
Status: DISCONTINUED | OUTPATIENT
Start: 2022-12-28 | End: 2022-12-28 | Stop reason: HOSPADM

## 2022-12-28 RX ORDER — SIMETHICONE 20 MG/.3ML
EMULSION ORAL PRN
Status: DISCONTINUED | OUTPATIENT
Start: 2022-12-28 | End: 2022-12-28 | Stop reason: ALTCHOICE

## 2022-12-28 RX ORDER — SODIUM CHLORIDE 9 MG/ML
25 INJECTION, SOLUTION INTRAVENOUS PRN
Status: DISCONTINUED | OUTPATIENT
Start: 2022-12-28 | End: 2022-12-28 | Stop reason: HOSPADM

## 2022-12-28 RX ORDER — SODIUM CHLORIDE 0.9 % (FLUSH) 0.9 %
5-40 SYRINGE (ML) INJECTION EVERY 12 HOURS SCHEDULED
Status: DISCONTINUED | OUTPATIENT
Start: 2022-12-28 | End: 2022-12-28 | Stop reason: HOSPADM

## 2022-12-28 RX ORDER — SODIUM CHLORIDE, SODIUM LACTATE, POTASSIUM CHLORIDE, CALCIUM CHLORIDE 600; 310; 30; 20 MG/100ML; MG/100ML; MG/100ML; MG/100ML
500 INJECTION, SOLUTION INTRAVENOUS CONTINUOUS
Status: DISCONTINUED | OUTPATIENT
Start: 2022-12-28 | End: 2022-12-28 | Stop reason: HOSPADM

## 2022-12-28 RX ADMIN — SODIUM CHLORIDE, POTASSIUM CHLORIDE, SODIUM LACTATE AND CALCIUM CHLORIDE 500 ML: 600; 310; 30; 20 INJECTION, SOLUTION INTRAVENOUS at 07:42

## 2022-12-28 RX ADMIN — PROPOFOL 120 MCG/KG/MIN: 10 INJECTION, EMULSION INTRAVENOUS at 08:02

## 2022-12-28 ASSESSMENT — PAIN - FUNCTIONAL ASSESSMENT: PAIN_FUNCTIONAL_ASSESSMENT: 0-10

## 2022-12-28 ASSESSMENT — PAIN SCALES - GENERAL: PAINLEVEL_OUTOF10: 0

## 2022-12-28 NOTE — H&P
Patient Name: Luz Doshi  : 1975  MRN: [id number]  DATE: 22      ENDOSCOPY  History and Physical    Procedure:    [] Diagnostic Colonoscopy       [x] Screening Colonoscopy  [] EGD      [] ERCP      [] EUS       [] Other    [x] Previous office notes/History and Physical reviewed from the patients chart. Please see EMR for further details of HPI. I have examined the patient's status immediately prior to the procedure and:      Indications/HPI:    []Abdominal Pain  []Cancer- GI/Lung  []Fhx of colon CA/polyps  []History of Polyps  []Lais   []Melena  []Abnormal Imaging  []Dysphagia    []Persistent Pneumonia  []Anemia  []Food Impaction  []History of Polyps  []GI Bleed  []Pulmonary nodule/Mass  []Change in bowel habits []Heartburn/Reflux  []Rectal Bleed (BRBPR)  []Chest Pain - Non Cardiac []Heme (+) Stoo  l[]Ulcers  []Constipation  []Hemoptysis   []Varices  []Diarrhea  []Hypoxemia  []Nausea/Vomiting  []Screening   []Crohns/Colitis  []Other:     Anesthesia:   [x] MAC [] Moderate Sedation   [] General   [] None     ROS: 12 pt Review of Symptoms was negative unless mentioned above    Medications:   Prior to Admission medications    Medication Sig Start Date End Date Taking?  Authorizing Provider   rivaroxaban (XARELTO) 20 MG TABS tablet Take 1 tablet by mouth daily 22   Liliya Silveira, DO   dilTIAZem (CARDIZEM CD) 120 MG extended release capsule Take 1 capsule by mouth daily 22   Liliya Silveira, DO   omeprazole (PRILOSEC) 10 MG delayed release capsule Take 10 mg by mouth as needed Pt states he only takes it maybe once a week unsure of dose     Historical Provider, MD       Allergies: No Known Allergies     History of allergic reaction to anesthesia:  No    Past Medical History:  Past Medical History:   Diagnosis Date    A-fib [facility])     Murmur        Past Surgical History:  Past Surgical History:   Procedure Laterality Date    IR MIDLINE CATH  2022    IR MIDLINE CATH 2022 MLOZ SPECIAL PROCEDURE    LAPAROSCOPIC APPENDECTOMY N/A 2022    LAPAROSCOPIC APPENDECTOMY, PATIENT COMING FROM [place] performed by Rupesh French MD at [place] [address] History:  Social History     Tobacco Use    Smoking status: Former     Packs/day: 0.50     Types: Cigarettes     Quit date: 2022     Years since quittin.9    Smokeless tobacco: Never    Tobacco comments:     quit 2022   Vaping Use    Vaping Use: Never used   Substance Use Topics    Alcohol use: Not Currently    Drug use: Never       Vital Signs:   Vitals:    22 0739   BP: (!) 140/79   Pulse: 79   Resp: 20   Temp: 98.7 °F (37.1 °C)   SpO2: 95%        Physical Exam:  Cardiac:  [x]WNL  []Comments:  Pulmonary:  [x]WNL   []Comments:   Neuro/Mental Status:  [x]WNL  []Comments:  Abdominal:  [x]WNL    []Comments:  Other:   []WNL  []Comments:    Informed Consent:  The risks and benefits of the procedure have been discussed with either the patient or if they cannot consent, their representative. Assessment:  Patient examined and appropriate for planned sedation and procedure. Plan:  Proceed with planned sedation and procedure as above.     Sherwin Shrestha MD  7:58 AM

## 2022-12-28 NOTE — DISCHARGE INSTRUCTIONS
Lower Discharge Instructions    Patient Name: Brian Clifton  Patient ID: [id number]  YOB: 1975  Procedure: Brenda Ramirez  Referring Physician: [unfilled]  Procedure Date: 12/28/2022    Recommendations:  []   Follow-up appointment with family physician in 4 weeks. []   Colonoscopy recommended in 5  years. []   Follow-up appointment with endoscopist in  Reports of your procedure and these recommendations have been sent to:  [unfilled]    Sedative medication given for procedures can slow your reaction time and coordination for many hours. If you receive medications, it is important for your safety to follow the instructions below for the remainder of the day:  BE TAKEN directly home from the center and rest quietly. DO NOT resume normal activities until tomorrow. Do NOT drive, return to work, or operate any machinery or power tools. Do NOT make any important personal or business decisions, sign any legal papers or perform any activity that depends on your full concentration power or mental judgement. Do NOT drink any alcohol or take nerve or sleeping drugs. They add to the effects of the medicine still present in your body.    [] (Checked if a biopsy or polyp removal was performed)  There is a slight risk of bleeding. If you had a biopsy or a polyp removed, we suggest that you follow the instructions below:  Do NOT take aspirin or similar anti-inflammatory medicine for ___ days. Do NOT exercise, jog, or do any heavy lifting or straining for 1 day. Potential common after effects and treatment following the procedure:  Mild abdominal pain, bloating, or excessive gas - rest, eat lightly, and use a heating pad. Redness and/or swelling where the IV was - apply heat and elevate. Symptoms to report to your physician:  Severe abdominal pain or bloating. Chills or fever above 101 degrees occurring within 24 hours after procedure. Large amounts of rectal bleeding that does not stop.  Small amount of rectal bleeding is not serious especially if hemorrhoids are present. Unable to keep down food or drink. IV site stays red and swollen for more than 2 days. In the case of an emergency, please go to the emergency room. If you are not having an emergency but are having some of the above symptoms please call the doctor's office at:  Dr. Gonsalo Orellana [phone]   @[email or web address]      I have read and understand the above instructions:            ____________________________         ____________________________  Patient or Patient Rep. Signature   Witness Signature      Date: 12/28/2022  Time:

## 2022-12-28 NOTE — PROGRESS NOTES
Discharge instructions reviewed with pt by Anahi José RN, pt verbalized understanding. Dr Mendel Babe also in with pt while in recovery.

## 2022-12-28 NOTE — ANESTHESIA POSTPROCEDURE EVALUATION
Department of Anesthesiology  Postprocedure Note    Patient: Tiffanie Santana  MRN: [id number]  YOB: 1975  Date of evaluation: 12/28/2022      Procedure Summary     Date: 12/28/22 Room / Location: [address] [place] [address]    Anesthesia Start: 9279 Anesthesia Stop:     Procedure: COLORECTAL CANCER SCREENING, NOT HIGH RISK (Anus) Diagnosis:       Colon cancer screening      (Colon cancer screening [Z12.11])    Surgeons: Beckey Gaucher, MD Responsible Provider: Donna Golden MD    Anesthesia Type: MAC ASA Status: 3          Anesthesia Type: No value filed.     Sandra Phase I:      Sandra Phase II:        Anesthesia Post Evaluation    Patient location during evaluation: PACU  Patient participation: complete - patient participated  Level of consciousness: awake  Pain score: 0  Airway patency: patent  Nausea & Vomiting: no nausea  Complications: no  Cardiovascular status: hemodynamically stable  Respiratory status: face mask  Hydration status: euvolemic

## 2022-12-28 NOTE — ANESTHESIA PRE PROCEDURE
Department of Anesthesiology  Preprocedure Note       Name:  Mary Varner   Age:  56 y.o.  :  1975                                          MRN:  [id number]         Date:  2022      Surgeon: Zheng Joseph):  Dwight Blank MD    Procedure: Procedure(s):  COLORECTAL CANCER SCREENING, NOT HIGH RISK    Medications prior to admission:   Prior to Admission medications    Medication Sig Start Date End Date Taking?  Authorizing Provider   rivaroxaban (XARELTO) 20 MG TABS tablet Take 1 tablet by mouth daily 22   Nikkiamanda Noguera, DO   dilTIAZem (CARDIZEM CD) 120 MG extended release capsule Take 1 capsule by mouth daily 22   Nikki Noguera, DO   omeprazole (PRILOSEC) 10 MG delayed release capsule Take 10 mg by mouth as needed Pt states he only takes it maybe once a week unsure of dose     Historical Provider, MD       Current medications:    Current Facility-Administered Medications   Medication Dose Route Frequency Provider Last Rate Last Admin    lactated ringers infusion 500 mL  500 mL IntraVENous Continuous Dwight Blank MD           Allergies:  No Known Allergies    Problem List:    Patient Active Problem List   Diagnosis Code    Appendicitis with perforation K35.32    Acute appendicitis with perforation, localized peritonitis, and abscess, without gangrene K35.33    Newly recognized heart murmur R01.1    Abnormal stress test R94.39    Atrial fibrillation with rapid ventricular response ([address] I48.91       Past Medical History:        Diagnosis Date    Murmur        Past Surgical History:        Procedure Laterality Date    IR MIDLINE CATH  2022    IR MIDLINE CATH 2022 MLOZ SPECIAL PROCEDURE    LAPAROSCOPIC APPENDECTOMY N/A 2022    LAPAROSCOPIC APPENDECTOMY, PATIENT COMING FROM [place] performed by Jaswant Carrasco MD at [place] [address] History:    Social History     Tobacco Use    Smoking status: Former     Packs/day: 0.50     Types: Cigarettes     Quit date: 2022 Years since quittin.9    Smokeless tobacco: Never    Tobacco comments:     quit 2022   Substance Use Topics    Alcohol use: Not Currently                                Counseling given: Not Answered  Tobacco comments: quit 2022      Vital Signs (Current): There were no vitals filed for this visit. BP Readings from Last 3 Encounters:   22 120/79   10/31/22 137/75   22 110/74       NPO Status: Time of last liquid consumption: 0430                        Time of last solid consumption: 1800                        Date of last liquid consumption: 22                        Date of last solid food consumption: 22    BMI:   Wt Readings from Last 3 Encounters:   22 209 lb (94.8 kg)   10/30/22 220 lb (99.8 kg)   22 222 lb 3.2 oz (100.8 kg)     There is no height or weight on file to calculate BMI.    CBC:   Lab Results   Component Value Date/Time    WBC 8.7 10/31/2022 06:10 AM    RBC 5.19 10/31/2022 06:10 AM    HGB 14.2 10/31/2022 06:10 AM    HCT 42.3 10/31/2022 06:10 AM    MCV 81.5 10/31/2022 06:10 AM    RDW 13.1 10/31/2022 06:10 AM     10/31/2022 06:10 AM       CMP:   Lab Results   Component Value Date/Time     10/31/2022 06:09 AM    K 4.1 10/31/2022 06:09 AM     10/31/2022 06:09 AM    CO2 25 10/31/2022 06:09 AM    BUN 25 10/31/2022 06:09 AM    CREATININE 0.91 10/31/2022 06:09 AM    GFRAA >60.0 2022 02:57 PM    LABGLOM >60.0 10/31/2022 06:09 AM    GLUCOSE 102 10/31/2022 06:09 AM    PROT 7.6 10/30/2022 09:01 PM    CALCIUM 9.0 10/31/2022 06:09 AM    BILITOT 0.5 10/30/2022 09:01 PM    ALKPHOS 116 10/30/2022 09:01 PM    AST 14 10/30/2022 09:01 PM    ALT 20 10/30/2022 09:01 PM       POC Tests: No results for input(s): POCGLU, POCNA, POCK, POCCL, POCBUN, POCHEMO, POCHCT in the last 72 hours.     Coags:   Lab Results   Component Value Date/Time    PROTIME 13.2 10/30/2022 09:01 PM    INR 1.0 10/30/2022 09:01 PM APTT 29.5 10/30/2022 09:01 PM       HCG (If Applicable): No results found for: PREGTESTUR, PREGSERUM, HCG, HCGQUANT     ABGs: No results found for: PHART, PO2ART, CNY3TSM, CHW8QYO, BEART, O8ITHLNC     Type & Screen (If Applicable):  No results found for: LABABO, LABRH    Drug/Infectious Status (If Applicable):  Lab Results   Component Value Date/Time    HEPCAB NONREACTIVE 04/25/2022 07:28 AM       COVID-19 Screening (If Applicable):   Lab Results   Component Value Date/Time    COVID19 Not Detected 01/11/2022 06:03 AM           Anesthesia Evaluation  Patient summary reviewed and Nursing notes reviewed no history of anesthetic complications:   Airway: Mallampati: II  TM distance: >3 FB   Neck ROM: full  Mouth opening: > = 3 FB   Dental: normal exam         Pulmonary:Negative Pulmonary ROS and normal exam                               Cardiovascular:Negative CV ROS                      Neuro/Psych:   Negative Neuro/Psych ROS              GI/Hepatic/Renal: Neg GI/Hepatic/Renal ROS            Endo/Other: Negative Endo/Other ROS                    Abdominal:             Vascular: negative vascular ROS. Other Findings:           Anesthesia Plan      MAC     ASA 3       Induction: intravenous. Anesthetic plan and risks discussed with patient.                         Rex Lucia MD   12/28/2022

## 2023-01-27 PROBLEM — Z12.11 COLON CANCER SCREENING: Status: RESOLVED | Noted: 2022-12-28 | Resolved: 2023-01-27

## 2023-02-02 ENCOUNTER — APPOINTMENT (OUTPATIENT)
Dept: GENERAL RADIOLOGY | Age: 48
End: 2023-02-02
Payer: COMMERCIAL

## 2023-02-02 ENCOUNTER — HOSPITAL ENCOUNTER (EMERGENCY)
Age: 48
Discharge: LEFT BEFORE DISCHARGE | End: 2023-02-02
Payer: COMMERCIAL

## 2023-02-02 VITALS
DIASTOLIC BLOOD PRESSURE: 86 MMHG | RESPIRATION RATE: 29 BRPM | SYSTOLIC BLOOD PRESSURE: 110 MMHG | BODY MASS INDEX: 26.41 KG/M2 | TEMPERATURE: 97.5 F | OXYGEN SATURATION: 95 % | WEIGHT: 195 LBS | HEIGHT: 72 IN | HEART RATE: 101 BPM

## 2023-02-02 DIAGNOSIS — I48.91 ATRIAL FIBRILLATION WITH RVR (HCC): Primary | ICD-10-CM

## 2023-02-02 LAB
ALBUMIN SERPL-MCNC: 5 G/DL (ref 3.5–4.6)
ALP BLD-CCNC: 109 U/L (ref 35–104)
ALT SERPL-CCNC: 21 U/L (ref 0–41)
AMPHETAMINE SCREEN, URINE: NORMAL
ANION GAP SERPL CALCULATED.3IONS-SCNC: 18 MEQ/L (ref 9–15)
APTT: 36.8 SEC (ref 24.4–36.8)
AST SERPL-CCNC: 25 U/L (ref 0–40)
BARBITURATE SCREEN URINE: NORMAL
BASOPHILS ABSOLUTE: 0.1 K/UL (ref 0–0.1)
BASOPHILS RELATIVE PERCENT: 1 % (ref 0.2–1.2)
BENZODIAZEPINE SCREEN, URINE: NORMAL
BILIRUB SERPL-MCNC: 1.1 MG/DL (ref 0.2–0.7)
BUN BLDV-MCNC: 18 MG/DL (ref 6–20)
CALCIUM SERPL-MCNC: 10.2 MG/DL (ref 8.5–9.9)
CANNABINOID SCREEN URINE: NORMAL
CHLORIDE BLD-SCNC: 98 MEQ/L (ref 95–107)
CO2: 20 MEQ/L (ref 20–31)
COCAINE METABOLITE SCREEN URINE: NORMAL
CREAT SERPL-MCNC: 1.01 MG/DL (ref 0.7–1.2)
EOSINOPHILS ABSOLUTE: 0.3 K/UL (ref 0–0.5)
EOSINOPHILS RELATIVE PERCENT: 2.5 % (ref 0.8–7)
GFR SERPL CREATININE-BSD FRML MDRD: >60 ML/MIN/{1.73_M2}
GLOBULIN: 3.2 G/DL (ref 2.3–3.5)
GLUCOSE BLD-MCNC: 162 MG/DL (ref 70–99)
HCT VFR BLD CALC: 46.8 % (ref 42–52)
HEMOGLOBIN: 15.7 G/DL (ref 13.7–17.5)
IMMATURE GRANULOCYTES #: 0 K/UL
IMMATURE GRANULOCYTES %: 0.3 %
INR BLD: 1.2
LYMPHOCYTES ABSOLUTE: 3.9 K/UL (ref 1.3–3.6)
LYMPHOCYTES RELATIVE PERCENT: 35.4 %
Lab: NORMAL
MAGNESIUM: 2 MG/DL (ref 1.7–2.4)
MCH RBC QN AUTO: 27.2 PG (ref 25.7–32.2)
MCHC RBC AUTO-ENTMCNC: 33.5 % (ref 32.3–36.5)
MCV RBC AUTO: 81.1 FL (ref 79–92.2)
MONOCYTES ABSOLUTE: 1.1 K/UL (ref 0.3–0.8)
MONOCYTES RELATIVE PERCENT: 10 % (ref 5.3–12.2)
NEUTROPHILS ABSOLUTE: 5.6 K/UL (ref 1.8–5.4)
NEUTROPHILS RELATIVE PERCENT: 50.8 % (ref 34–67.9)
OPIATE SCREEN URINE: NORMAL
PDW BLD-RTO: 13.2 % (ref 11.6–14.4)
PHENCYCLIDINE SCREEN URINE: NORMAL
PLATELET # BLD: 355 K/UL (ref 163–337)
POTASSIUM SERPL-SCNC: 3.4 MEQ/L (ref 3.4–4.9)
PRO-BNP: 237 PG/ML
PROTHROMBIN TIME: 15.4 SEC (ref 12.3–14.9)
RBC # BLD: 5.77 M/UL (ref 4.63–6.08)
SODIUM BLD-SCNC: 136 MEQ/L (ref 135–144)
TOTAL PROTEIN: 8.2 G/DL (ref 6.3–8)
TROPONIN: <0.01 NG/ML (ref 0–0.01)
WBC # BLD: 11 K/UL (ref 4.2–9)

## 2023-02-02 PROCEDURE — 84484 ASSAY OF TROPONIN QUANT: CPT

## 2023-02-02 PROCEDURE — 83735 ASSAY OF MAGNESIUM: CPT

## 2023-02-02 PROCEDURE — 99285 EMERGENCY DEPT VISIT HI MDM: CPT

## 2023-02-02 PROCEDURE — 80306 DRUG TEST PRSMV INSTRMNT: CPT

## 2023-02-02 PROCEDURE — 85610 PROTHROMBIN TIME: CPT

## 2023-02-02 PROCEDURE — 93005 ELECTROCARDIOGRAM TRACING: CPT

## 2023-02-02 PROCEDURE — 96376 TX/PRO/DX INJ SAME DRUG ADON: CPT

## 2023-02-02 PROCEDURE — 83880 ASSAY OF NATRIURETIC PEPTIDE: CPT

## 2023-02-02 PROCEDURE — 36415 COLL VENOUS BLD VENIPUNCTURE: CPT

## 2023-02-02 PROCEDURE — 2580000003 HC RX 258

## 2023-02-02 PROCEDURE — 85730 THROMBOPLASTIN TIME PARTIAL: CPT

## 2023-02-02 PROCEDURE — 2500000003 HC RX 250 WO HCPCS

## 2023-02-02 PROCEDURE — 71045 X-RAY EXAM CHEST 1 VIEW: CPT

## 2023-02-02 PROCEDURE — 85025 COMPLETE CBC W/AUTO DIFF WBC: CPT

## 2023-02-02 PROCEDURE — 96365 THER/PROPH/DIAG IV INF INIT: CPT

## 2023-02-02 PROCEDURE — 80053 COMPREHEN METABOLIC PANEL: CPT

## 2023-02-02 PROCEDURE — 96366 THER/PROPH/DIAG IV INF ADDON: CPT

## 2023-02-02 RX ORDER — METOPROLOL TARTRATE 5 MG/5ML
5 INJECTION INTRAVENOUS ONCE
Status: DISCONTINUED | OUTPATIENT
Start: 2023-02-02 | End: 2023-02-02

## 2023-02-02 RX ORDER — DILTIAZEM HYDROCHLORIDE 5 MG/ML
10 INJECTION INTRAVENOUS ONCE
Status: COMPLETED | OUTPATIENT
Start: 2023-02-02 | End: 2023-02-02

## 2023-02-02 RX ORDER — 0.9 % SODIUM CHLORIDE 0.9 %
1000 INTRAVENOUS SOLUTION INTRAVENOUS ONCE
Status: COMPLETED | OUTPATIENT
Start: 2023-02-02 | End: 2023-02-02

## 2023-02-02 RX ADMIN — DILTIAZEM HYDROCHLORIDE 10 MG: 5 INJECTION INTRAVENOUS at 18:57

## 2023-02-02 RX ADMIN — SODIUM CHLORIDE 1000 ML: 9 INJECTION, SOLUTION INTRAVENOUS at 18:53

## 2023-02-02 RX ADMIN — DILTIAZEM HYDROCHLORIDE 5 MG/HR: 5 INJECTION, SOLUTION INTRAVENOUS at 18:58

## 2023-02-02 ASSESSMENT — ENCOUNTER SYMPTOMS
VOMITING: 0
SORE THROAT: 0
NAUSEA: 0
SHORTNESS OF BREATH: 1
COUGH: 0
ABDOMINAL PAIN: 0
BACK PAIN: 0
DIARRHEA: 0

## 2023-02-02 NOTE — ED PROVIDER NOTES
[address] [place] [address] ED  eMERGENCYdEPARTMENT eNCOUnter      Pt Name: Modesto Kumar  MRN: [id number]  Cartergfvíctor 11/30/2229AC evaluation: 2/2/2023  Mendel Boeck, APRN - JUDI    CHIEF COMPLAINT       Chief Complaint   Patient presents with    Palpitations     Hx of A-fib. Shortness of Breath     Onset- 3pm today. HISTORY OF PRESENT ILLNESS  (Location/Symptom, Timing/Onset, Context/Setting, Quality, Duration, Modifying Factors, Severity.)   Modesto Kumar is a 56 y.o. male hx of afib with RVR, cardiac murmur, who presents to the emergency department for palpitations and SOB. Patient states he was at work today when he got palpitations around 1500. He has a hx of afib with RVR and takes Cardizem and Xarelto routinely. He says when he feels palpitations they normally go away but today they persisted. He feels some SOB with palpitations. Denies any increased caffeine intake, drug or ETOH usage. He has been under some increased stress at work lately. Dr. Lolly Templeton is his cardiologist.  Denies any SOB, fever, chills, recent illness, abdominal pain, nausea, emesis, headache. HPI    Nursing Notes were reviewed and I agree. REVIEW OF SYSTEMS    (2-9 systems for level 4, 10 or more for level 5)     Review of Systems   Constitutional:  Negative for activity change, chills and fever. HENT:  Negative for ear pain and sore throat. Eyes:  Negative for visual disturbance. Respiratory:  Positive for shortness of breath. Negative for cough. Cardiovascular:  Positive for palpitations. Negative for chest pain and leg swelling. Gastrointestinal:  Negative for abdominal pain, diarrhea, nausea and vomiting. Genitourinary:  Negative for dysuria. Musculoskeletal:  Negative for back pain. Skin:  Negative for rash. Neurological:  Negative for dizziness and weakness. as noted above the remainder of the review of systems was reviewed and negative.        PAST MEDICAL HISTORY     Past Medical History: Diagnosis Date    A-fib [facility])     Murmur          SURGICAL HISTORY       Past Surgical History:   Procedure Laterality Date    COLONOSCOPY N/A 2022    COLORECTAL CANCER SCREENING, NOT HIGH RISK performed by Devorah Silver MD at [address] CATH  2022    IR MIDLINE CATH 2022 MLOZ SPECIAL PROCEDURE    LAPAROSCOPIC APPENDECTOMY N/A 2022    LAPAROSCOPIC APPENDECTOMY, PATIENT COMING FROM [place] [facility]       Previous Medications    DILTIAZEM (CARDIZEM CD) 120 MG EXTENDED RELEASE CAPSULE    Take 1 capsule by mouth daily    OMEPRAZOLE (PRILOSEC) 10 MG DELAYED RELEASE CAPSULE    Take 10 mg by mouth as needed Pt states he only takes it maybe once a week unsure of dose     RIVAROXABAN (XARELTO) 20 MG TABS TABLET    Take 1 tablet by mouth daily       ALLERGIES     Patient has no known allergies.     HISTORY       Family History   Problem Relation Age of Onset    Cancer Mother     Cancer Father           SOCIAL HISTORY       Social History     Socioeconomic History    Marital status: Single     Spouse name: None    Number of children: None    Years of education: None    Highest education level: None   Tobacco Use    Smoking status: Former     Packs/day: 0.50     Types: Cigarettes     Quit date: 2022     Years since quittin.0    Smokeless tobacco: Never    Tobacco comments:     quit 2022   Vaping Use    Vaping Use: Never used   Substance and Sexual Activity    Alcohol use: Not Currently    Drug use: Never     Social Determinants of Health     Financial Resource Strain: Low Risk     Difficulty of Paying Living Expenses: Not hard at all   Food Insecurity: No Food Insecurity    Worried About Running Out of Food in the Last Year: Never true    Ran Out of Food in the Last Year: Never true       SCREENINGS           PHYSICAL EXAM    (up to 7 forlevel 4, 8 or more for level 5)     ED Triage Vitals [23 1846]   BP Temp Temp src Heart Rate Resp SpO2 Height Weight   (!) 146/87 -- -- (!) 148 20 100 % 6' (1.829 m) 195 lb (88.5 kg)       Physical Exam  Vitals and nursing note reviewed. Constitutional:       General: He is not in acute distress. Appearance: He is well-developed. He is not ill-appearing. HENT:      Head: Normocephalic and atraumatic. Right Ear: External ear normal.      Left Ear: External ear normal.   Eyes:      Conjunctiva/sclera: Conjunctivae normal.      Pupils: Pupils are equal, round, and reactive to light. Cardiovascular:      Rate and Rhythm: Tachycardia present. Rhythm irregular. Pulses:           Radial pulses are 2+ on the right side and 2+ on the left side. Heart sounds: Normal heart sounds. Comments: Afib RVR  Pulmonary:      Effort: Pulmonary effort is normal. No tachypnea or respiratory distress. Breath sounds: Normal breath sounds. No stridor. Abdominal:      General: Bowel sounds are normal. There is no distension. Palpations: Abdomen is soft. Tenderness: There is no abdominal tenderness. Musculoskeletal:         General: Normal range of motion. Cervical back: Normal range of motion and neck supple. Right lower leg: No edema. Left lower leg: No edema. Skin:     General: Skin is warm and dry. Capillary Refill: Capillary refill takes less than 2 seconds. Neurological:      General: No focal deficit present. Mental Status: He is alert and oriented to person, place, and time.    Psychiatric:         Mood and Affect: Mood normal.         Behavior: Behavior normal.         DIAGNOSTIC RESULTS     RADIOLOGY:   Non-plain film images such as CT, Ultrasound and MRI are read by the radiologist. Plain radiographic images are visualized and preliminarilyinterpreted by CYNTHIA eDy CNP with the below findings:        Interpretation per the Radiologist below, if available at the time of this note:    XR CHEST PORTABLE   Final Result Normal chest.             LABS:  Labs Reviewed   CBC WITH AUTO DIFFERENTIAL - Abnormal; Notable for the following components:       Result Value    WBC 11.0 (*)     Platelets 940 (*)     Neutrophils Absolute 5.6 (*)     Lymphocytes Absolute 3.9 (*)     Monocytes Absolute 1.1 (*)     All other components within normal limits   COMPREHENSIVE METABOLIC PANEL - Abnormal; Notable for the following components:    Anion Gap 18 (*)     Glucose 162 (*)     Calcium 10.2 (*)     Total Protein 8.2 (*)     Albumin 5.0 (*)     Total Bilirubin 1.1 (*)     Alkaline Phosphatase 109 (*)     All other components within normal limits   PROTIME-INR - Abnormal; Notable for the following components:    Protime 15.4 (*)     All other components within normal limits   MAGNESIUM   BRAIN NATRIURETIC PEPTIDE   TROPONIN   APTT   URINE DRUG SCREEN, COMPREHENSIVE       All other labs were within normal range or not returnedas of this dictation. EMERGENCYDEPARTMENT COURSE and DIFFERENTIAL DIAGNOSIS/MDM:   Vitals:    Vitals:    02/02/23 2015 02/02/23 2030 02/02/23 2045 02/02/23 2100   BP: 125/76 114/79 108/76 106/72   Pulse: 87 86 86 78   Resp: 11 14 12 12   Temp:       TempSrc:       SpO2: 97% 98% 97% 97%   Weight:       Height:           REASSESSMENT            MDM  JL 56year old male presents to ED for palpitations and SOB. Patient afebrile and tachycardic Afib RVR upon arrival. EKG shows Afib with RVR with , ST & T wave abnormality changes. Cardizem IV bolus given. IVF NS bolus given. Cardizem drip started. Labs: Troponin negative. . CBC WBC 11 no anemia. CMP at baseline no acute electrolyte imbalance. CXR shows no acute cardio pulmonary process. EKG shows NSR with  PAC HR 86, nonspecific ST abnormality noted on previous EKG 10/30/2022. Urine toxicity screening negative. Suspect rate controlled Afib RVR, patient has chronic Afib. Cardizem drip titrated and patient tolerating well with rate controlled.  Discussed case with attending Dr. Williams Bhardwaj, he advised admission. Discussed admission and reasoning with patient and significant other at length, patient expresses he wants a new cardiologist. Advised patient I could put call out to hospital of his choice if he does not want to be admitted to [facility]. Patient wants to be discharged AMA. Discussed case with attending and advised to DC patient AMA as per his wishes. Took time and explained diagnosis, treatment, testing and need for observation/admission at great length with patient and family he states full understanding. Denies any chest pain, SOB, or palpitations. He is NSR on monitor rate controlled in 70s BP stable. Cardizem drip titrated down to 2.5mg/hr and will be removed per RN upon [facility] discharge. Advised patient to f/u immediately with PCP and Cardiology and to return to ED if symptoms return. PROCEDURES:    Procedures      FINAL IMPRESSION      1.  Atrial fibrillation with RVR [facility])          DISPOSITION/PLAN   DISPOSITION [place] 02/02/2023 09:22:41 PM      PATIENT REFERRED TO:  [facility] ED  [address]    If symptoms worsen    DO Catrachita Avalos [address] [place] [address]  [phone]    Call today      DISCHARGE MEDICATIONS:  New Prescriptions    No medications on file       (Please note that portions of this note were completed with a voice recognition program.  Efforts were made to edit the dictations but occasionally words are mis-transcribed.)    CYNTHIA Walls CNP, APRN - CNP  02/02/23 2795

## 2023-02-02 NOTE — LETTER
[facility] ED  [address] [place] [address]  Phone: [phone]    Patient: Tomasa Age  YOB: 1975  Date: 2/2/2023 Time: 9:11 PM    Leaving the Hospital Against Medical Advice    Chart #:[id number]    This will certify that I, the undersigned,    ______________________________________________________________________    A patient in the above named medical center, having requested discharge and removal from the medical center against the advice of my attending physician(s), hereby release the Emergency Department, its physicians, officers and employees, severally and individually, from any and all liability of any nature whatsoever for any injury or harm or complication of any kind that may result directly or indirectly, by reason of my terminating my stay as a patient from [facility], and hereby waive any and all rights of action I may now have or later acquire as a result of my voluntary departure from [facility] and the termination of my stay as a patient therein. This release is made with the full knowledge of the danger that may result from the action which I am taking.       Date:_______________________                         ___________________________                                                                                    Patient/Legal Representative    Witness:        ____________________________                          ___________________________  Nurse                                                                        Physician
Provider called immediately. Enhanced supervision initiated.

## 2023-02-03 LAB
EKG ATRIAL RATE: 131 BPM
EKG ATRIAL RATE: 86 BPM
EKG P AXIS: 38 DEGREES
EKG P-R INTERVAL: 140 MS
EKG Q-T INTERVAL: 290 MS
EKG Q-T INTERVAL: 364 MS
EKG QRS DURATION: 88 MS
EKG QRS DURATION: 88 MS
EKG QTC CALCULATION (BAZETT): 435 MS
EKG QTC CALCULATION (BAZETT): 473 MS
EKG R AXIS: 57 DEGREES
EKG R AXIS: 58 DEGREES
EKG T AXIS: -61 DEGREES
EKG T AXIS: 15 DEGREES
EKG VENTRICULAR RATE: 160 BPM
EKG VENTRICULAR RATE: 86 BPM

## 2023-02-03 PROCEDURE — 93010 ELECTROCARDIOGRAM REPORT: CPT | Performed by: INTERNAL MEDICINE

## 2023-02-15 ENCOUNTER — TELEPHONE (OUTPATIENT)
Dept: CARDIOLOGY CLINIC | Age: 48
End: 2023-02-15

## 2023-02-15 NOTE — TELEPHONE ENCOUNTER
Appointment canceled for Lacretia Mccloud ([id number])   Visit Type: OFFICE VISIT   Date        Time      Length    Provider                  Department   3/28/2023    1:30 PM  15 mins. MD Perry Torres 93      Reason for Cancellation: Changed Provider     Appointment Scheduled  (Newest Message First)  Fabiana Floyd  Patient Appointment Cancel Request Pool 28 minutes ago (9:35 AM)     Appointment canceled for Lacretia Mccloud ([id number])  Visit Type: OFFICE VISIT  Date        Time      Length    Provider                  Department  3/28/2023    1:30 PM  15 mins. MD Perry Torres 93     Reason for Cancellation: Changed Provider       Batch Job User Caitlyn  Jose Ramon Lex[employer] 4 months ago     Mychart, Processor  Fabiana Floyd 4 months ago     PM  Appointment Information:      Visit Type: Office Visit          Date: 3/28/2023                  Dept: [facility]                  Provider: Declan SYLVESTER                  Time: 1:30 PM                  Length: 15 min     Appt Status: Scheduled        Appt Instructions:      Please arrive 15 minutes prior to appointment, bring photo ID and insurance   card.

## (undated) DEVICE — WARMER SCP 2 ANTIFOG LAP DISP

## (undated) DEVICE — GLOVE ORANGE PI 7 1/2   MSG9075

## (undated) DEVICE — TISSUE RETRIEVAL SYSTEM: Brand: INZII RETRIEVAL SYSTEM

## (undated) DEVICE — Device: Brand: ENDO SMARTCAP

## (undated) DEVICE — TRAP,MUCUS SPECIMEN, 80CC: Brand: MEDLINE

## (undated) DEVICE — SUTURE SZ 0 27IN 5/8 CIR UR-6  TAPER PT VIOLET ABSRB VICRYL J603H

## (undated) DEVICE — CUTTER ENDOSCP L340MM LIN ARTC SGL STROKE FIRING ENDOPATH

## (undated) DEVICE — TOWEL,OR,DSP,ST,BLUE,STD,4/PK,20PK/CS: Brand: MEDLINE

## (undated) DEVICE — RELOAD STPL SZ 0 L45MM DIA3.5MM 0DEG STD REG TISS BLU TI

## (undated) DEVICE — 4-PORT MANIFOLD: Brand: NEPTUNE 2

## (undated) DEVICE — GOWN,AURORA,NONREINFORCED,LARGE: Brand: MEDLINE

## (undated) DEVICE — TUBE ENDOSCP COLON CHANNEL

## (undated) DEVICE — ELECTRODE PT RET AD L9FT HI MOIST COND ADH HYDRGEL CORDED

## (undated) DEVICE — ENDO CARRY-ON PROCEDURE KIT INCLUDES LUBRICANT, DEFENDO OLYMPUS AIR, WATER, SUCTION, BIOPSY VALVE KIT, ENZYMATIC SPONGE, AND BASIN.: Brand: ENDO CARRY-ON PROCEDURE KIT

## (undated) DEVICE — LAPAROSCOPIC TROCAR SLEEVE/SINGLE USE: Brand: KII® OPTICAL ACCESS SYSTEM

## (undated) DEVICE — COUNTER NDL 40 COUNT HLD 70 FOAM BLK ADH W/ MAG

## (undated) DEVICE — BANDAGE ADH W2XL4IN NITRL FAB STRP CURAD

## (undated) DEVICE — BW-412T DISP COMBO CLEANING BRUSH: Brand: SINGLE USE COMBINATION CLEANING BRUSH

## (undated) DEVICE — PACK,BASIC IV,SIRUS: Brand: MEDLINE

## (undated) DEVICE — INTENDED FOR TISSUE SEPARATION, AND OTHER PROCEDURES THAT REQUIRE A SHARP SURGICAL BLADE TO PUNCTURE OR CUT.: Brand: BARD-PARKER ® CARBON RIB-BACK BLADES

## (undated) DEVICE — ELECTRODE LAP L36CM PTFE WIRE J HK CLEANCOAT

## (undated) DEVICE — Device

## (undated) DEVICE — SUTURE MCRYL SZ 4-0 L27IN ABSRB UD L19MM PS-2 1/2 CIR PRIM Y426H

## (undated) DEVICE — TRAYS TRANSPORT SCOPE OASIS W/LID

## (undated) DEVICE — ADAPTER FLSH PMP FLD MGMT GI IRRIG OFP 2 DISPOSABLE

## (undated) DEVICE — BANDAGE ADH W0.75XL3IN UNIV WVN FAB NAT GEN USE STRP N ADH

## (undated) DEVICE — FORCEPS BX L240CM JAW DIA2.4MM ORNG L CAP W/ NDL DISP RAD

## (undated) DEVICE — Z DUPLICATE USE 2431315 SET INSUF TBNG HI FLO W/ SMK EVAC FOR PNEUMOCLEAR

## (undated) DEVICE — SINGLE PORT MANIFOLD: Brand: NEPTUNE 2

## (undated) DEVICE — KIT,ANTI FOG,W/SPONGE & FLUID,SOFT PACK: Brand: MEDLINE

## (undated) DEVICE — LABEL MED MINI W/ MARKER

## (undated) DEVICE — DRAPE EQUIP TRNSPRT CONTAINMENT FOR BK TAB

## (undated) DEVICE — GAUZE,SPONGE,4"X4",16PLY,XRAY,STRL,LF: Brand: MEDLINE

## (undated) DEVICE — TROCAR: Brand: KII® SLEEVE

## (undated) DEVICE — APPLICATOR MEDICATED 26 CC SOLUTION HI LT ORNG CHLORAPREP

## (undated) DEVICE — TROCAR: Brand: KII FIOS FIRST ENTRY

## (undated) DEVICE — NEPTUNE E-SEP SMOKE EVACUATION PENCIL, COATED, 70MM BLADE, PUSH BUTTON SWITCH: Brand: NEPTUNE E-SEP

## (undated) DEVICE — DRAPE,LAP,CHOLE,W/TROUGHS,STERILE: Brand: MEDLINE

## (undated) DEVICE — RELOAD STPL H1-2.5X45MM VASC THN TISS WHT 6 ROW B FRM SGL

## (undated) DEVICE — MEDI-VAC NON-CONDUCTIVE SUCTION TUBING: Brand: CARDINAL HEALTH